# Patient Record
Sex: FEMALE | Race: WHITE | Employment: OTHER | URBAN - METROPOLITAN AREA
[De-identification: names, ages, dates, MRNs, and addresses within clinical notes are randomized per-mention and may not be internally consistent; named-entity substitution may affect disease eponyms.]

---

## 2022-03-30 ENCOUNTER — APPOINTMENT (OUTPATIENT)
Dept: CT IMAGING | Age: 67
DRG: 216 | End: 2022-03-30
Payer: MEDICARE

## 2022-03-30 ENCOUNTER — HOSPITAL ENCOUNTER (INPATIENT)
Age: 67
LOS: 15 days | Discharge: HOME HEALTH | DRG: 216 | End: 2022-04-14
Attending: EMERGENCY MEDICINE | Admitting: THORACIC SURGERY (CARDIOTHORACIC VASCULAR SURGERY)
Payer: MEDICARE

## 2022-03-30 ENCOUNTER — APPOINTMENT (OUTPATIENT)
Dept: GENERAL RADIOLOGY | Age: 67
DRG: 216 | End: 2022-03-30
Payer: MEDICARE

## 2022-03-30 DIAGNOSIS — I34.0 MITRAL VALVE INSUFFICIENCY, UNSPECIFIED ETIOLOGY: Primary | ICD-10-CM

## 2022-03-30 DIAGNOSIS — G89.18 ACUTE POST-OPERATIVE PAIN: ICD-10-CM

## 2022-03-30 DIAGNOSIS — I48.91 ATRIAL FIBRILLATION WITH RVR (HCC): ICD-10-CM

## 2022-03-30 PROBLEM — I50.9 DECOMPENSATED HEART FAILURE (HCC): Status: ACTIVE | Noted: 2022-03-30

## 2022-03-30 PROBLEM — I44.4 LAFB (LEFT ANTERIOR FASCICULAR BLOCK): Status: ACTIVE | Noted: 2022-03-30

## 2022-03-30 PROBLEM — J90 PLEURAL EFFUSION, BILATERAL: Status: ACTIVE | Noted: 2022-03-30

## 2022-03-30 PROBLEM — R01.1 PANSYSTOLIC MURMUR: Status: ACTIVE | Noted: 2022-03-30

## 2022-03-30 PROBLEM — I51.7 CARDIOMEGALY: Status: ACTIVE | Noted: 2022-03-30

## 2022-03-30 LAB
A/G RATIO: 1.5 (ref 1.1–2.2)
ALBUMIN SERPL-MCNC: 4.6 G/DL (ref 3.4–5)
ALP BLD-CCNC: 89 U/L (ref 40–129)
ALT SERPL-CCNC: 19 U/L (ref 10–40)
ANION GAP SERPL CALCULATED.3IONS-SCNC: 12 MMOL/L (ref 3–16)
AST SERPL-CCNC: 31 U/L (ref 15–37)
BASE EXCESS VENOUS: 0.3 MMOL/L (ref -3–3)
BASOPHILS ABSOLUTE: 0 K/UL (ref 0–0.2)
BASOPHILS RELATIVE PERCENT: 0.6 %
BILIRUB SERPL-MCNC: 0.6 MG/DL (ref 0–1)
BILIRUBIN URINE: NEGATIVE
BLOOD, URINE: NEGATIVE
BUN BLDV-MCNC: 18 MG/DL (ref 7–20)
CALCIUM SERPL-MCNC: 9.5 MG/DL (ref 8.3–10.6)
CARBOXYHEMOGLOBIN: 2.4 % (ref 0–1.5)
CHLORIDE BLD-SCNC: 99 MMOL/L (ref 99–110)
CLARITY: CLEAR
CO2: 27 MMOL/L (ref 21–32)
COLOR: YELLOW
CREAT SERPL-MCNC: 0.8 MG/DL (ref 0.6–1.2)
EKG ATRIAL RATE: 156 BPM
EKG DIAGNOSIS: NORMAL
EKG Q-T INTERVAL: 324 MS
EKG QRS DURATION: 86 MS
EKG QTC CALCULATION (BAZETT): 496 MS
EKG R AXIS: 116 DEGREES
EKG T AXIS: -16 DEGREES
EKG VENTRICULAR RATE: 141 BPM
EOSINOPHILS ABSOLUTE: 0.1 K/UL (ref 0–0.6)
EOSINOPHILS RELATIVE PERCENT: 2 %
GFR AFRICAN AMERICAN: >60
GFR NON-AFRICAN AMERICAN: >60
GLUCOSE BLD-MCNC: 96 MG/DL (ref 70–99)
GLUCOSE URINE: NEGATIVE MG/DL
HCO3 VENOUS: 23.8 MMOL/L (ref 23–29)
HCT VFR BLD CALC: 42.2 % (ref 36–48)
HEMOGLOBIN: 14.2 G/DL (ref 12–16)
INR BLD: 1.29 (ref 0.88–1.12)
KETONES, URINE: NEGATIVE MG/DL
LACTIC ACID: 1.1 MMOL/L (ref 0.4–2)
LEUKOCYTE ESTERASE, URINE: NEGATIVE
LYMPHOCYTES ABSOLUTE: 0.7 K/UL (ref 1–5.1)
LYMPHOCYTES RELATIVE PERCENT: 12.8 %
MAGNESIUM: 2.1 MG/DL (ref 1.8–2.4)
MCH RBC QN AUTO: 32.1 PG (ref 26–34)
MCHC RBC AUTO-ENTMCNC: 33.6 G/DL (ref 31–36)
MCV RBC AUTO: 95.7 FL (ref 80–100)
METHEMOGLOBIN VENOUS: 0.6 %
MICROSCOPIC EXAMINATION: NORMAL
MONOCYTES ABSOLUTE: 0.3 K/UL (ref 0–1.3)
MONOCYTES RELATIVE PERCENT: 5 %
NEUTROPHILS ABSOLUTE: 4.6 K/UL (ref 1.7–7.7)
NEUTROPHILS RELATIVE PERCENT: 79.6 %
NITRITE, URINE: NEGATIVE
O2 SAT, VEN: 64 %
O2 THERAPY: ABNORMAL
PCO2, VEN: 35.5 MMHG (ref 40–50)
PDW BLD-RTO: 14.7 % (ref 12.4–15.4)
PH UA: 6.5 (ref 5–8)
PH VENOUS: 7.45 (ref 7.35–7.45)
PLATELET # BLD: 192 K/UL (ref 135–450)
PMV BLD AUTO: 7.2 FL (ref 5–10.5)
PO2, VEN: 32.9 MMHG (ref 25–40)
POTASSIUM REFLEX MAGNESIUM: 4.7 MMOL/L (ref 3.5–5.1)
PRO-BNP: 3634 PG/ML (ref 0–124)
PROCALCITONIN: 0.05 NG/ML (ref 0–0.15)
PROTEIN UA: NEGATIVE MG/DL
PROTHROMBIN TIME: 14.7 SEC (ref 9.9–12.7)
RBC # BLD: 4.41 M/UL (ref 4–5.2)
SODIUM BLD-SCNC: 138 MMOL/L (ref 136–145)
SPECIFIC GRAVITY UA: 1.01 (ref 1–1.03)
TCO2 CALC VENOUS: 25 MMOL/L
TOTAL PROTEIN: 7.6 G/DL (ref 6.4–8.2)
TROPONIN: <0.01 NG/ML
TROPONIN: <0.01 NG/ML
URINE REFLEX TO CULTURE: NORMAL
URINE TYPE: NORMAL
UROBILINOGEN, URINE: 0.2 E.U./DL
WBC # BLD: 5.7 K/UL (ref 4–11)

## 2022-03-30 PROCEDURE — 2580000003 HC RX 258: Performed by: PHYSICIAN ASSISTANT

## 2022-03-30 PROCEDURE — 6370000000 HC RX 637 (ALT 250 FOR IP): Performed by: HOSPITALIST

## 2022-03-30 PROCEDURE — 84439 ASSAY OF FREE THYROXINE: CPT

## 2022-03-30 PROCEDURE — 6360000002 HC RX W HCPCS

## 2022-03-30 PROCEDURE — 93010 ELECTROCARDIOGRAM REPORT: CPT | Performed by: INTERNAL MEDICINE

## 2022-03-30 PROCEDURE — 6360000004 HC RX CONTRAST MEDICATION: Performed by: HOSPITALIST

## 2022-03-30 PROCEDURE — 85610 PROTHROMBIN TIME: CPT

## 2022-03-30 PROCEDURE — 85025 COMPLETE CBC W/AUTO DIFF WBC: CPT

## 2022-03-30 PROCEDURE — 2500000003 HC RX 250 WO HCPCS: Performed by: PHYSICIAN ASSISTANT

## 2022-03-30 PROCEDURE — 2500000003 HC RX 250 WO HCPCS

## 2022-03-30 PROCEDURE — 81003 URINALYSIS AUTO W/O SCOPE: CPT

## 2022-03-30 PROCEDURE — 84484 ASSAY OF TROPONIN QUANT: CPT

## 2022-03-30 PROCEDURE — 83880 ASSAY OF NATRIURETIC PEPTIDE: CPT

## 2022-03-30 PROCEDURE — 80053 COMPREHEN METABOLIC PANEL: CPT

## 2022-03-30 PROCEDURE — P9047 ALBUMIN (HUMAN), 25%, 50ML: HCPCS

## 2022-03-30 PROCEDURE — 84443 ASSAY THYROID STIM HORMONE: CPT

## 2022-03-30 PROCEDURE — 2580000003 HC RX 258

## 2022-03-30 PROCEDURE — 71260 CT THORAX DX C+: CPT

## 2022-03-30 PROCEDURE — 82803 BLOOD GASES ANY COMBINATION: CPT

## 2022-03-30 PROCEDURE — 83605 ASSAY OF LACTIC ACID: CPT

## 2022-03-30 PROCEDURE — 83036 HEMOGLOBIN GLYCOSYLATED A1C: CPT

## 2022-03-30 PROCEDURE — 83735 ASSAY OF MAGNESIUM: CPT

## 2022-03-30 PROCEDURE — 84145 PROCALCITONIN (PCT): CPT

## 2022-03-30 PROCEDURE — 1200000000 HC SEMI PRIVATE

## 2022-03-30 PROCEDURE — 99283 EMERGENCY DEPT VISIT LOW MDM: CPT

## 2022-03-30 PROCEDURE — 36415 COLL VENOUS BLD VENIPUNCTURE: CPT

## 2022-03-30 PROCEDURE — 96374 THER/PROPH/DIAG INJ IV PUSH: CPT

## 2022-03-30 PROCEDURE — 71045 X-RAY EXAM CHEST 1 VIEW: CPT

## 2022-03-30 PROCEDURE — 6360000002 HC RX W HCPCS: Performed by: HOSPITALIST

## 2022-03-30 PROCEDURE — 93005 ELECTROCARDIOGRAM TRACING: CPT | Performed by: EMERGENCY MEDICINE

## 2022-03-30 RX ORDER — ONDANSETRON 4 MG/1
4 TABLET, ORALLY DISINTEGRATING ORAL EVERY 8 HOURS PRN
Status: DISCONTINUED | OUTPATIENT
Start: 2022-03-30 | End: 2022-04-06

## 2022-03-30 RX ORDER — FUROSEMIDE 10 MG/ML
20 INJECTION INTRAMUSCULAR; INTRAVENOUS 2 TIMES DAILY
Status: DISCONTINUED | OUTPATIENT
Start: 2022-03-30 | End: 2022-03-31

## 2022-03-30 RX ORDER — ASPIRIN 81 MG/1
162 TABLET, CHEWABLE ORAL ONCE
Status: DISCONTINUED | OUTPATIENT
Start: 2022-03-30 | End: 2022-04-06

## 2022-03-30 RX ORDER — NITROGLYCERIN 0.4 MG/1
0.4 TABLET SUBLINGUAL EVERY 5 MIN PRN
Status: COMPLETED | OUTPATIENT
Start: 2022-03-30 | End: 2022-04-05

## 2022-03-30 RX ORDER — FUROSEMIDE 10 MG/ML
10 INJECTION INTRAMUSCULAR; INTRAVENOUS 2 TIMES DAILY
Status: DISCONTINUED | OUTPATIENT
Start: 2022-03-31 | End: 2022-03-30

## 2022-03-30 RX ORDER — DILTIAZEM HYDROCHLORIDE 5 MG/ML
10 INJECTION INTRAVENOUS ONCE
Status: COMPLETED | OUTPATIENT
Start: 2022-03-30 | End: 2022-03-30

## 2022-03-30 RX ORDER — POTASSIUM CHLORIDE 7.45 MG/ML
10 INJECTION INTRAVENOUS PRN
Status: DISCONTINUED | OUTPATIENT
Start: 2022-03-30 | End: 2022-04-06

## 2022-03-30 RX ORDER — SENNA PLUS 8.6 MG/1
1 TABLET ORAL DAILY PRN
Status: DISCONTINUED | OUTPATIENT
Start: 2022-03-30 | End: 2022-04-06

## 2022-03-30 RX ORDER — MAGNESIUM SULFATE IN WATER 40 MG/ML
2000 INJECTION, SOLUTION INTRAVENOUS PRN
Status: DISCONTINUED | OUTPATIENT
Start: 2022-03-30 | End: 2022-04-06

## 2022-03-30 RX ORDER — ACETAMINOPHEN 325 MG/1
650 TABLET ORAL EVERY 6 HOURS PRN
Status: DISCONTINUED | OUTPATIENT
Start: 2022-03-30 | End: 2022-04-06

## 2022-03-30 RX ORDER — ASPIRIN 81 MG/1
81 TABLET ORAL DAILY
Status: DISCONTINUED | OUTPATIENT
Start: 2022-03-31 | End: 2022-04-06

## 2022-03-30 RX ORDER — FUROSEMIDE 10 MG/ML
20 INJECTION INTRAMUSCULAR; INTRAVENOUS 2 TIMES DAILY
Status: DISCONTINUED | OUTPATIENT
Start: 2022-03-30 | End: 2022-03-30

## 2022-03-30 RX ORDER — POTASSIUM CHLORIDE 20 MEQ/1
40 TABLET, EXTENDED RELEASE ORAL PRN
Status: DISCONTINUED | OUTPATIENT
Start: 2022-03-30 | End: 2022-04-06

## 2022-03-30 RX ORDER — ACETAMINOPHEN 650 MG/1
650 SUPPOSITORY RECTAL EVERY 6 HOURS PRN
Status: DISCONTINUED | OUTPATIENT
Start: 2022-03-30 | End: 2022-04-06

## 2022-03-30 RX ORDER — ONDANSETRON 2 MG/ML
4 INJECTION INTRAMUSCULAR; INTRAVENOUS EVERY 6 HOURS PRN
Status: DISCONTINUED | OUTPATIENT
Start: 2022-03-30 | End: 2022-04-06

## 2022-03-30 RX ADMIN — DILTIAZEM HYDROCHLORIDE 5 MG/HR: 5 INJECTION INTRAVENOUS at 16:29

## 2022-03-30 RX ADMIN — APIXABAN 5 MG: 5 TABLET, FILM COATED ORAL at 20:50

## 2022-03-30 RX ADMIN — ACETAMINOPHEN 650 MG: 325 TABLET ORAL at 21:02

## 2022-03-30 RX ADMIN — DILTIAZEM HYDROCHLORIDE 10 MG: 5 INJECTION INTRAVENOUS at 16:29

## 2022-03-30 RX ADMIN — FUROSEMIDE 20 MG: 10 INJECTION, SOLUTION INTRAMUSCULAR; INTRAVENOUS at 20:50

## 2022-03-30 RX ADMIN — IOPAMIDOL 75 ML: 755 INJECTION, SOLUTION INTRAVENOUS at 17:53

## 2022-03-30 ASSESSMENT — ENCOUNTER SYMPTOMS
EYES NEGATIVE: 1
BACK PAIN: 0
COLOR CHANGE: 0
SHORTNESS OF BREATH: 1
VOMITING: 0
CHEST TIGHTNESS: 0
ABDOMINAL PAIN: 0
NAUSEA: 0

## 2022-03-30 ASSESSMENT — PAIN SCALES - GENERAL: PAINLEVEL_OUTOF10: 4

## 2022-03-30 NOTE — ED NOTES
[place] ([facility]) sandwich and water given per MD request.      Juana Reyes RN  03/30/22 8899

## 2022-03-30 NOTE — ED PROVIDER NOTES
[address] [facility]  ED  EMERGENCY DEPARTMENT ENCOUNTER        Pt Name: David Reyez  MRN: [id number]  Elena 1955  Date of evaluation: 3/30/2022  Provider: Rock Angel PA-C  PCP: No primary care provider on file. ED Attending: Efrain Weaver      This patient was seen by the attending provider      History provided by the patient    CHIEF COMPLAINT:     Chief Complaint   Patient presents with    Atrial Fibrillation     hx of afib, was at PCP today and told that her heart was going to quickly       HISTORY OF PRESENT ILLNESS:      David Reyez is a 75 y.o. female who arrives to the ED by private vehicle. This patient reports for the last several months she has been getting progressively more fatigued, generally weak, short of breath with exertion and she has noted some swelling in her legs at times. She states for years she is checked her blood pressure and heart rate at home and at times is found her heart rate to be as high as the 140s or 150s. She states she is believes she has had A. fib for years but never wanted to go to the doctor. Finally, after more than 20 years, patient scheduled PCP appointment today. When she got there, she was told she was in A. fib with RVR and needed to come to the ED. On arrival patient denies pain. She does admit to feeling generally weak. She seems to identify that her symptoms are worse with activity and better at rest.  No other complaints or concerns. No known medical history as she never goes to the doctor. Nursing Notes were reviewed     REVIEW OF SYSTEMS:     Review of Systems   Constitutional: Positive for activity change and fatigue. Negative for fever. HENT: Negative. Eyes: Negative. Respiratory: Positive for shortness of breath. Negative for chest tightness. Cardiovascular: Positive for palpitations and leg swelling. Negative for chest pain. Gastrointestinal: Negative for abdominal pain, nausea and vomiting.    Genitourinary: Negative. Musculoskeletal: Negative for back pain, gait problem and neck pain. Skin: Negative for color change. Neurological: Positive for weakness (generalized). Negative for headaches. All other systems reviewed and are negative. Except as noted above in the ROS, all other systems were reviewed and negative. PAST MEDICAL HISTORY:     Past Medical History:   Diagnosis Date    Varicose vein          SURGICAL HISTORY:      Past Surgical History:   Procedure Laterality Date    TONSILLECTOMY           CURRENT MEDICATIONS:       Previous Medications    ASPIRIN 300 MG SUPPOSITORY    Place 300 mg rectally every 6 hours as needed. ALLERGIES:    Dairycare [lactase-lactobacillus] and Eggs or egg-derived products    FAMILY HISTORY:       Family History   Problem Relation Age of Onset    Cancer Father     Cancer Paternal Aunt     Cancer Paternal Grandmother           SOCIAL HISTORY:       Social History     Socioeconomic History    Marital status: Single     Spouse name: None    Number of children: None    Years of education: None    Highest education level: None   Occupational History    None   Tobacco Use    Smoking status: Never Smoker    Smokeless tobacco: None   Substance and Sexual Activity    Alcohol use: No    Drug use: No    Sexual activity: None   Other Topics Concern    None   Social History Narrative    None     Social Determinants of Health     Financial Resource Strain:     Difficulty of Paying Living Expenses: Not on file   Food Insecurity:     Worried About Running Out of Food in the Last Year: Not on file    Tawanna of Food in the Last Year: Not on file   Transportation Needs:     Lack of Transportation (Medical): Not on file    Lack of Transportation (Non-Medical):  Not on file   Physical Activity:     Days of Exercise per Week: Not on file    Minutes of Exercise per Session: Not on file   Stress:     Feeling of Stress : Not on file   Social Connections:  Frequency of Communication with Friends and Family: Not on file    Frequency of Social Gatherings with Friends and Family: Not on file    Attends Zoroastrianism Services: Not on file    Active Member of Clubs or Organizations: Not on file    Attends Club or Organization Meetings: Not on file    Marital Status: Not on file   Intimate Partner Violence:     Fear of Current or Ex-Partner: Not on file    Emotionally Abused: Not on file    Physically Abused: Not on file    Sexually Abused: Not on file   Housing Stability:     Unable to Pay for Housing in the Last Year: Not on file    Number of [place] in the Last Year: Not on file    Unstable Housing in the Last Year: Not on file       SCREENINGS:             PHYSICAL EXAM:       ED Triage Vitals   BP Temp Temp src Pulse Resp SpO2 Height Weight   03/30/22 1556 03/30/22 1558 -- 03/30/22 1556 03/30/22 1556 03/30/22 1556 -- 03/30/22 1556   (!) 150/108 98.7 °F (37.1 °C)  128 18 96 %  107 lb (48.5 kg)       Physical Exam    CONSTITUTIONAL: Awake and alert. Cooperative. Well-developed. Well-nourished. Non-toxic. No acute distress. HENT: Normocephalic. Atraumatic. External ears normal, without discharge. No nasal discharge. Oropharynx clear. Mucous membranes moist.  EYES: Conjunctiva non-injected. No scleral icterus. PERRL. EOM's grossly intact. NECK: Supple. Normal ROM. CARDIOVASCULAR: Tachycardic with irregularly irregular rhythm. No Murmer. Intact distal pulses. PULMONARY/CHEST WALL: Effort normal. No tachypnea. Lungs clear to ausculation. ABDOMEN: Normal BS. Soft. Nondistended. No tenderness to palpate. No guarding. /ANORECTAL: Not assessed  MUSKULOSKELETAL: Normal ROM. No acute deformities. 1+ pitting edema bilateral LEs. No tenderness to palpate. SKIN: Warm and dry. No rash. NEUROLOGICAL: Alert and oriented x 3. GCS 15. CN II-XII grossly intact. Strength is 5/5 in all extremities and sensation is intact. Normal gait.    PSYCHIATRIC: Normal affect        DIAGNOSTICRESULTS:     LABS:    Results for orders placed or performed during the hospital encounter of 03/30/22   CBC with Auto Differential   Result Value Ref Range    WBC 5.7 4.0 - 11.0 K/uL    RBC 4.41 4.00 - 5.20 M/uL    Hemoglobin 14.2 12.0 - 16.0 g/dL    Hematocrit 42.2 36.0 - 48.0 %    MCV 95.7 80.0 - 100.0 fL    MCH 32.1 26.0 - 34.0 pg    MCHC 33.6 31.0 - 36.0 g/dL    RDW 14.7 12.4 - 15.4 %    Platelets 910 722 - 075 K/uL    MPV 7.2 5.0 - 10.5 fL    Neutrophils % 79.6 %    Lymphocytes % 12.8 %    Monocytes % 5.0 %    Eosinophils % 2.0 %    Basophils % 0.6 %    Neutrophils Absolute 4.6 1.7 - 7.7 K/uL    Lymphocytes Absolute 0.7 (L) 1.0 - 5.1 K/uL    Monocytes Absolute 0.3 0.0 - 1.3 K/uL    Eosinophils Absolute 0.1 0.0 - 0.6 K/uL    Basophils Absolute 0.0 0.0 - 0.2 K/uL   Comprehensive Metabolic Panel w/ Reflex to MG   Result Value Ref Range    Sodium 138 136 - 145 mmol/L    Potassium reflex Magnesium 4.7 3.5 - 5.1 mmol/L    Chloride 99 99 - 110 mmol/L    CO2 27 21 - 32 mmol/L    Anion Gap 12 3 - 16    Glucose 96 70 - 99 mg/dL    BUN 18 7 - 20 mg/dL    CREATININE 0.8 0.6 - 1.2 mg/dL    GFR Non-African American >60 >60    GFR African American >60 >60    Calcium 9.5 8.3 - 10.6 mg/dL    Total Protein 7.6 6.4 - 8.2 g/dL    Albumin 4.6 3.4 - 5.0 g/dL    Albumin/Globulin Ratio 1.5 1.1 - 2.2    Total Bilirubin 0.6 0.0 - 1.0 mg/dL    Alkaline Phosphatase 89 40 - 129 U/L    ALT 19 10 - 40 U/L    AST 31 15 - 37 U/L   Troponin   Result Value Ref Range    Troponin <0.01 <0.01 ng/mL   Brain Natriuretic Peptide   Result Value Ref Range    Pro-BNP 3,634 (H) 0 - 124 pg/mL   EKG 12 Lead   Result Value Ref Range    Ventricular Rate 141 BPM    Atrial Rate 156 BPM    QRS Duration 86 ms    Q-T Interval 324 ms    QTc Calculation (Bazett) 496 ms    R Axis 116 degrees    T Axis -16 degrees    Diagnosis       Atrial fibrillation with rapid ventricular response with premature ventricular or aberrantly conducted complexesLeft posterior fascicular blockAnterior infarct , age undeterminedAbnormal ECGNo previous ECGs availableConfirmed by Brett Luis ([id number]) on 3/30/2022 5:20:14 PM         RADIOLOGY:  All x-ray studies areviewed/reviewed by me. Formal interpretations per the radiologist are as follows:    XR CHEST PORTABLE    Result Date: 3/30/2022  EXAMINATION: ONE XRAY VIEW OF THE CHEST 3/30/2022 4:10 pm COMPARISON: None. HISTORY: ORDERING SYSTEM PROVIDED HISTORY: SOB, a fib with RVR TECHNOLOGIST PROVIDED HISTORY: Reason for exam:->SOB, a fib with RVR Reason for Exam: afib FINDINGS: Cardiomegaly. Bilateral pleural effusions right greater than left. No pneumothorax. Bibasilar opacities. No pulmonary vascular congestion. Bilateral pleural effusions right greater than left with bibasilar opacities that could represent atelectasis or pneumonia       EKG: The Ekg interpreted by me in the absence of a cardiologist shows. atrial fibrillation with a rate of 141    See also interpretation by DO Junior      PROCEDURES:   N/A    CRITICAL CARE TIME:       Due to the immediate potential for life-threatening deterioration due to A. fib with RVR requiring diltiazem bolus, infusion and hospitalization, I spent 34 minutes providing critical care. This time is excluding time spent performing procedures.       CONSULTS:  IP CONSULT TO HOSPITALIST  IP CONSULT TO CARDIOLOGY      EMERGENCY DEPARTMENT COURSE and DIFFERENTIAL DIAGNOSIS/MDM:   Vitals:    Vitals:    03/30/22 1558 03/30/22 1631 03/30/22 1636 03/30/22 1730   BP:  (!) 128/96  135/83   Pulse: 98 131 90 112   Resp:  28 24 17   Temp: 98.7 °F (37.1 °C)      SpO2:  95% 92% 93%   Weight:           Patient was given the following medications:  Medications   dilTIAZem injection 10 mg (10 mg IntraVENous Given 3/30/22 1629)     Followed by   dilTIAZem 125 mg in dextrose 5 % 125 mL infusion (10 mg/hr IntraVENous Rate/Dose Change 3/30/22 1736)   iopamidol ([id number]) 76 % thatportions of this note were completed with a voice recognition program.  Efforts were made to edit the dictations, but occasionally words are mis-transcribed.)    Armin Post PA-C (electronicallysigned)              Shobha Jasmine, [id number]  03/30/22 2871

## 2022-03-30 NOTE — H&P
HOSPITALISTS HISTORY AND PHYSICAL    3/30/2022 5:45 PM    Patient Information:  Janett Franks is a 75 y.o. female [id number]  PCP:  No primary care provider on file. (Tel: None )    Chief complaint:    Chief Complaint   Patient presents with    Atrial Fibrillation     hx of afib, was at PCP today and told that her heart was going to quickly        History of Present Illness:  Per Duggan is a 75 y.o. female who presented to the ED to be evaluated for a several year history of heart palpitations, which became associated with weakness and dyspnea 3 months PTA. The patient reports that she has not been seen by any type of physician for greater than 2 decades. She is scheduled a new PCP appointment for earlier today, but when she arrived she was tachycardic into the 150s therefore EMS was contacted to transport her to the ED. Upon further questioning, patient endorses intermittent chest pain for which she takes a baby aspirin PRN. She states that she has noted increased THOMPSON as well as BLE edema ongoing since the beginning of 2022. Upon arrival to the ED EKG was obtained revealing A. fib RVR with ventricular rate 141; PVCs, anterior infarct of unknown duration, L APB, and QTC prolongation also appreciated. CXR notable for bilateral pleural effusions as well as bibasilar opacities. Admitting hospitalist physician has ordered chest CT with PE protocol to further assess. Labs were essentially unremarkable excluding BNP 3634. Patient received IV Cardizem bolus and drip initiation per ED provider. She will be admitted for new onset A. fib with suspected decompensated CHF. History obtained from patient and review of Epic chart    REVIEW OF SYSTEMS:   Constitutional: Negative for fever,chills; positive generalized weakness  ENT: Negative for headache, rhinorrhea, and sore throat.   Respiratory: Exertional dyspnea x3 months; denies wheezing, and cough  Cardiovascular: Intermittent chest pain \"for years\"; positive palpitations and BLE peripheral edema  Gastrointestinal: Negative for N/V/D and abdominal pain; no hematemesis, hematochezia, or melena; no anorexia  Genitourinary: Negative for dysuria, frequency, retention; no incontinence  Hematologic/Lymphatic: Negative for bleeding tendency/excessive bruising  Musculoskeletal: Negative for myalgias and arthalgias; able to ambulate without difficulty  Neurologic: Negative for LOC, seizure activity, paresthesias, dysarthria, vertigo, and gait disturbance  Skin: Negative for itching,rash, decubitus  Psychiatric: Negative for depression,anxiety, and agitation; no hallucinations; denies SI/HI  Endocrine: Negative for polyuria/polydipsia/polyphagia; no heat/cold intolerance    Past Medical History:   has a past medical history of Varicose vein. Past Surgical History:   has a past surgical history that includes Tonsillectomy. Medications:  No current facility-administered medications on file prior to encounter. Current Outpatient Medications on File Prior to Encounter   Medication Sig Dispense Refill    aspirin 300 MG suppository Place 300 mg rectally every 6 hours as needed. Allergies: Allergies   Allergen Reactions    Dairycare [Lactase-Lactobacillus]     Eggs Or Egg-Derived Products         Social History:   reports that she has never smoked. She does not have any smokeless tobacco history on file. She reports that she does not drink alcohol and does not use drugs. Family History:  family history includes Cancer in her father, paternal aunt, and paternal grandmother. Physical Exam:  /83   Pulse 112   Temp 98.7 °F (37.1 °C)   Resp 17   Wt 107 lb (48.5 kg)   SpO2 93%     General appearance:  Thin female resting in bed, appears anxious  Eyes: Sclera clear without conjunctival injection; PERRLA; EOMI  ENT: Mucous membranes moist without thrush; normal dentition  Neck: Supple without meningismus; no goiter; no carotid bruit bilaterally  Cardiovascular: Irregularly irregular tachyarrhythmia; 4/5 pansystolic murmur; 1+ BLE peripheral edema; no JVD  Respiratory: Mild tachypnea; CTAB with diminished bibasilar breath sounds  Gastrointestinal: Abdomen soft, non-tender, not distended; bowel sounds normal; no masses/organomegaly appreciated  Musculoskeletal: FROM spine and extremities x4; no gross deformity  Neurology: A&O x3; cranial nerves 2-12 grossly intact; motor 5/5  BUE/BLE; no seizure activity;   Psychiatry: Well-groomed with good eye contact; appropriate affect; no visual/auditory hallucination  Skin: Warm, dry, normal turgor, no rash  PV: 2/4 radial and dorsalis pedis bilaterally; brisk capillary refill    Labs:  CBC:   Lab Results   Component Value Date    WBC 5.7 03/30/2022    RBC 4.41 03/30/2022    HGB 14.2 03/30/2022    HCT 42.2 03/30/2022    MCV 95.7 03/30/2022    MCH 32.1 03/30/2022    MCHC 33.6 03/30/2022    RDW 14.7 03/30/2022     03/30/2022    MPV 7.2 03/30/2022     BMP:    Lab Results   Component Value Date     03/30/2022    K 4.7 03/30/2022    CL 99 03/30/2022    CO2 27 03/30/2022    BUN 18 03/30/2022    CREATININE 0.8 03/30/2022    CALCIUM 9.5 03/30/2022    GFRAA >60 03/30/2022    LABGLOM >60 03/30/2022    GLUCOSE 96 03/30/2022     XR CHEST PORTABLE   Final Result   Bilateral pleural effusions right greater than left with bibasilar opacities   that could represent atelectasis or pneumonia         CT CHEST PULMONARY EMBOLISM W CONTRAST    (Results Pending)         EKG:    Ventricular Rate 141 BPM QTc Calculation (Bazett) 496 ms   Atrial Rate 156 BPM R Axis 116 degrees   QRS Duration 86 ms T Axis -16 degrees   Q-T Interval 324 ms Diagnosis Atrial fibrillation with rapid ventricular response with PVCs; anterior infarct age indeterminant; LPFB present; QTC prolongation       I visualized CXR images and EKG strips personally and agree with documented interpretation    Discussed case  with ED provider    Problem List:  Principal Problem:    Atrial fibrillation with RVR ([address]  Active Problems:    New onset atrial fibrillation (HCC)    Decompensated heart failure (HCC)    Pleural effusion, bilateral    LAFB (left anterior fascicular block)  Resolved Problems:    * No resolved hospital problems.  *        Consults:  IP CONSULT TO HOSPITALIST  IP CONSULT TO CARDIOLOGY      Assessment/Plan:     New onset A. fib with RVR  -Admit to medical floor for continuous telemetry and pulse oximetry monitoring  -Patient initiated on IV Cardizem bolus and drip, the latter of which will be continued overnight  -Electrolytes, thyroid studies to assess for secondary causes of this tachyarrhythmia  -Patient with CHADS2 score of 3, therefore she is a candidate for anticoagulation; Eliquis 5 mg twice daily initiated overnight  -Echo scheduled for the a.m. to further assess cardiac structure and function  -Consultation placed to Cardiology for further evaluation and treatment    Chest pain with LPFB  -Admit to telemetry floor with serial cardiac enzymes to be obtained overnight  -ASA administered in ED and to be continued at 81 mg PO QAM  -FLP ordered for a.m.; patient declined statin this evening  -Nitropaste applied to chest wall (BP permitting)  -ECHO scheduled for a.m.  -Nuclear stress test not ordered at this time due to A. fib RVR presentation  -Serial troponins to be trended overnight    Pulmonary edema  -Suspect decompensated CHF, although patient with no previous cardiac history or echo available for review  -Chest CT with PE protocol ordered revealing massive cardiomegaly with severe LAE enlargement  -Admit to floor for continuous telemetry monitoring and strict I's & O's  -IV Lasix 20 mg initiated Q12 hours  -2G Na and fluid restriction dietary modifications in place    DVT prophylaxis-Eliquis 5 mg twice daily (due to CHADS2 score of 3)  Code status-full code  Diet-cardiac GUIDO now than n.p.o. after midnight  IV access-PIV established in ED      Admit as inpatient. I anticipate hospitalization spanning more than two midnights for investigation and treatment of the above medically necessary diagnoses. Comment: Please note this report has been produced using speech recognition software and may contain errors related to that system including errors in grammar, punctuation, and spelling, as well as words and phrases that may be inappropriate. If there are any questions or concerns please feel free to contact the dictating provider for clarification.          Ana Laura Lucero MD    3/30/2022 5:45 PM

## 2022-03-31 LAB
A/G RATIO: 1.5 (ref 1.1–2.2)
ALBUMIN SERPL-MCNC: 4.5 G/DL (ref 3.4–5)
ALP BLD-CCNC: 83 U/L (ref 40–129)
ALT SERPL-CCNC: 18 U/L (ref 10–40)
ANION GAP SERPL CALCULATED.3IONS-SCNC: 13 MMOL/L (ref 3–16)
AST SERPL-CCNC: 29 U/L (ref 15–37)
BILIRUB SERPL-MCNC: 0.8 MG/DL (ref 0–1)
BUN BLDV-MCNC: 18 MG/DL (ref 7–20)
CALCIUM SERPL-MCNC: 9.4 MG/DL (ref 8.3–10.6)
CHLORIDE BLD-SCNC: 101 MMOL/L (ref 99–110)
CHOLESTEROL, TOTAL: 185 MG/DL (ref 0–199)
CO2: 27 MMOL/L (ref 21–32)
CREAT SERPL-MCNC: 0.8 MG/DL (ref 0.6–1.2)
ESTIMATED AVERAGE GLUCOSE: 116.9 MG/DL
GFR AFRICAN AMERICAN: >60
GFR NON-AFRICAN AMERICAN: >60
GLUCOSE BLD-MCNC: 100 MG/DL (ref 70–99)
HBA1C MFR BLD: 5.7 %
HDLC SERPL-MCNC: 70 MG/DL (ref 40–60)
LDL CHOLESTEROL CALCULATED: 102 MG/DL
LV EF: 55 %
LVEF MODALITY: NORMAL
POTASSIUM REFLEX MAGNESIUM: 3.9 MMOL/L (ref 3.5–5.1)
SODIUM BLD-SCNC: 141 MMOL/L (ref 136–145)
T4 FREE: 1.2 NG/DL (ref 0.9–1.8)
TOTAL PROTEIN: 7.5 G/DL (ref 6.4–8.2)
TRIGL SERPL-MCNC: 64 MG/DL (ref 0–150)
TROPONIN: <0.01 NG/ML
TSH SERPL DL<=0.05 MIU/L-ACNC: 4.4 UIU/ML (ref 0.27–4.2)
VLDLC SERPL CALC-MCNC: 13 MG/DL

## 2022-03-31 PROCEDURE — 2500000003 HC RX 250 WO HCPCS: Performed by: HOSPITALIST

## 2022-03-31 PROCEDURE — 84484 ASSAY OF TROPONIN QUANT: CPT

## 2022-03-31 PROCEDURE — 80053 COMPREHEN METABOLIC PANEL: CPT

## 2022-03-31 PROCEDURE — 80061 LIPID PANEL: CPT

## 2022-03-31 PROCEDURE — 6370000000 HC RX 637 (ALT 250 FOR IP): Performed by: INTERNAL MEDICINE

## 2022-03-31 PROCEDURE — 36415 COLL VENOUS BLD VENIPUNCTURE: CPT

## 2022-03-31 PROCEDURE — 99223 1ST HOSP IP/OBS HIGH 75: CPT | Performed by: INTERNAL MEDICINE

## 2022-03-31 PROCEDURE — 6360000002 HC RX W HCPCS: Performed by: INTERNAL MEDICINE

## 2022-03-31 PROCEDURE — 2580000003 HC RX 258: Performed by: HOSPITALIST

## 2022-03-31 PROCEDURE — 1200000000 HC SEMI PRIVATE

## 2022-03-31 PROCEDURE — 6370000000 HC RX 637 (ALT 250 FOR IP): Performed by: NURSE PRACTITIONER

## 2022-03-31 PROCEDURE — 6370000000 HC RX 637 (ALT 250 FOR IP): Performed by: HOSPITALIST

## 2022-03-31 PROCEDURE — 93306 TTE W/DOPPLER COMPLETE: CPT

## 2022-03-31 RX ORDER — BUTALBITAL, ACETAMINOPHEN AND CAFFEINE 50; 325; 40 MG/1; MG/1; MG/1
1 TABLET ORAL EVERY 4 HOURS PRN
Status: DISCONTINUED | OUTPATIENT
Start: 2022-03-31 | End: 2022-04-06

## 2022-03-31 RX ORDER — FUROSEMIDE 10 MG/ML
40 INJECTION INTRAMUSCULAR; INTRAVENOUS 2 TIMES DAILY
Status: DISCONTINUED | OUTPATIENT
Start: 2022-03-31 | End: 2022-04-03

## 2022-03-31 RX ORDER — METOPROLOL SUCCINATE 25 MG/1
25 TABLET, EXTENDED RELEASE ORAL DAILY
Status: DISCONTINUED | OUTPATIENT
Start: 2022-03-31 | End: 2022-04-02

## 2022-03-31 RX ADMIN — ENOXAPARIN SODIUM 50 MG: 60 INJECTION SUBCUTANEOUS at 22:30

## 2022-03-31 RX ADMIN — FUROSEMIDE 40 MG: 10 INJECTION, SOLUTION INTRAMUSCULAR; INTRAVENOUS at 11:16

## 2022-03-31 RX ADMIN — DILTIAZEM HYDROCHLORIDE 10 MG/HR: 5 INJECTION INTRAVENOUS at 04:12

## 2022-03-31 RX ADMIN — METOPROLOL SUCCINATE 25 MG: 25 TABLET, EXTENDED RELEASE ORAL at 11:16

## 2022-03-31 RX ADMIN — ASPIRIN 81 MG: 81 TABLET, COATED ORAL at 11:16

## 2022-03-31 RX ADMIN — BUTALBITAL, ACETAMINOPHEN, AND CAFFEINE 1 TABLET: 50; 325; 40 TABLET ORAL at 12:44

## 2022-03-31 RX ADMIN — FUROSEMIDE 40 MG: 10 INJECTION, SOLUTION INTRAMUSCULAR; INTRAVENOUS at 17:16

## 2022-03-31 RX ADMIN — ENOXAPARIN SODIUM 50 MG: 60 INJECTION SUBCUTANEOUS at 11:16

## 2022-03-31 RX ADMIN — ACETAMINOPHEN 650 MG: 325 TABLET ORAL at 04:10

## 2022-03-31 ASSESSMENT — PAIN DESCRIPTION - LOCATION
LOCATION: HEAD
LOCATION: HEAD

## 2022-03-31 ASSESSMENT — PAIN SCALES - GENERAL
PAINLEVEL_OUTOF10: 4
PAINLEVEL_OUTOF10: 2
PAINLEVEL_OUTOF10: 6
PAINLEVEL_OUTOF10: 5
PAINLEVEL_OUTOF10: 2
PAINLEVEL_OUTOF10: 5
PAINLEVEL_OUTOF10: 2
PAINLEVEL_OUTOF10: 2

## 2022-03-31 ASSESSMENT — PAIN DESCRIPTION - PAIN TYPE
TYPE: ACUTE PAIN
TYPE: CHRONIC PAIN

## 2022-03-31 NOTE — PLAN OF CARE
Problem: OXYGENATION/RESPIRATORY FUNCTION  Goal: Patient will maintain patent airway  Outcome: Ongoing  Note:   Patient's EF (Ejection Fraction) is greater than 40%    Heart Failure Medications:  Diuretics[de-identified] Furosemide    (One of the following REQUIRED for EF </= 40%/SYSTOLIC FAILURE but MAY be used in EF% >40%/DIASTOLIC FAILURE)        ACE[de-identified] None        ARB[de-identified] None         ARNI[de-identified] None    (Beta Blockers)  NON- Evidenced Based Beta Blocker (for EF% >40%/DIASTOLIC FAILURE): None    Evidenced Based Beta Blocker::(REQUIRED for EF% <40%/SYSTOLIC FAILURE) Metoprolol SUCCinate- Toprol XL  . .................................................................................................................................................. Patient's weights and intake/output reviewed: Yes    Patient's Last Weight: 102 lbs obtained by standing scale. Difference of 5 lbs less than last documented weight. Intake/Output Summary (Last 24 hours) at 3/31/2022 1132  Last data filed at 3/31/2022 0954  Gross per 24 hour   Intake 0 ml   Output 1100 ml   Net -1100 ml       Comorbidities Reviewed Yes    Patient has a past medical history of Varicose vein. >>For CHF and Comorbidity documentation on Education Time and Topics, please see Education Tab    Progressive Mobility Assessment:  What is this patient's Current Level of Mobility?: Ambulatory-Up Ad Anisha  How was this patient Mobilized today?: Edge of Bed,  Up to Toilet/Shower, and Up in Room, ambulated 20 ft                 With Whom? Self                 Level of Difficulty/Assistance: Independent     Pt resting in bed at this time on room air. Pt denies shortness of breath. Pt without lower extremity edema.      Patient and/or Family's stated Goal of Care this Admission: reduce shortness of breath, increase activity tolerance, better understand heart failure and disease management, and be more comfortable prior to discharge        :

## 2022-03-31 NOTE — PROGRESS NOTES
Patient admitted to room 204 from ER. Patient oriented to room, call light, bed rails, phone, lights and bathroom. Patient instructed about the schedule of the day including: vital sign frequency, lab draws, possible tests, frequency of MD and staff rounds, including RN/MD rounding together at bedside, daily weights, and I &O's. Patient instructed about prescribed diet, how to use television. bed alarm in place, patient aware of placement and reason. Telemetry box  in place, patient aware of placement and reason. Bed locked, in lowest position, side rails up 2/4, call light within reach. Will continue to monitor.

## 2022-03-31 NOTE — PROGRESS NOTES
Hospitalist Progress Note      PCP: No primary care provider on file. Date of Admission: 3/30/2022    Chief Complaint: [facility] Course: Terese Shah is a 75 y.o. female who presented to the ED to be evaluated for a several year history of heart palpitations, which became associated with weakness and dyspnea 3 months PTA. The patient reports that she has not been seen by any type of physician for greater than 2 decades. She is scheduled a new PCP appointment for earlier today, but when she arrived she was tachycardic into the 150s therefore EMS was contacted to transport her to the ED. Upon further questioning, patient endorses intermittent chest pain for which she takes a baby aspirin PRN. She states that she has noted increased THOMPSON as well as BLE edema ongoing since the beginning of 2022.     Upon arrival to the ED EKG was obtained revealing A. fib RVR with ventricular rate 141; PVCs, anterior infarct of unknown duration, L APB, and QTC prolongation also appreciated. CXR notable for bilateral pleural effusions as well as bibasilar opacities. Admitting hospitalist physician has ordered chest CT with PE protocol to further assess. Labs were essentially unremarkable excluding BNP 3634. Patient received IV Cardizem bolus and drip initiation per ED provider. She will be admitted for new onset A. fib with suspected decompensated CHF. Subjective: C/O headache, +THOMPSON. Updated on plan of care.        Medications:  Reviewed    Infusion Medications    dilTIAZem 10 mg/hr (03/31/22 4948)     Scheduled Medications    aspirin  162 mg Oral Once    aspirin  81 mg Oral Daily    apixaban  5 mg Oral BID    furosemide  20 mg IntraVENous BID     PRN Meds: potassium chloride **OR** potassium alternative oral replacement **OR** potassium chloride, magnesium sulfate, senna, acetaminophen **OR** acetaminophen, perflutren lipid microspheres, ondansetron **OR** ondansetron, nitroGLYCERIN      Intake/Output Summary (Last 24 hours) at 3/31/2022 0901  Last data filed at 3/31/2022 2657  Gross per 24 hour   Intake --   Output 1100 ml   Net -1100 ml       Physical Exam Performed:    /73   Pulse 84   Temp 98.1 °F (36.7 °C) (Oral)   Resp 18   Wt 102 lb 8 oz (46.5 kg)   SpO2 91%     General appearance: No apparent distress, appears stated age and cooperative. HEENT: Pupils equal, round, and reactive to light. Conjunctivae/corneas clear. Neck: Supple, with full range of motion. No jugular venous distention. Trachea midline. Respiratory:  Normal respiratory effort. Clear to auscultation, bilaterally without Rales/Wheezes/Rhonchi. Cardiovascular: Irregular rate and rhythm with BAY  Abdomen: Soft, non-tender, non-distended with normal bowel sounds. Musculoskeletal: No clubbing, cyanosis or edema bilaterally. Full range of motion without deformity. Skin: Skin color, texture, turgor normal.  No rashes or lesions. Neurologic:  Neurovascularly intact without any focal sensory/motor deficits.  Cranial nerves: II-XII intact, grossly non-focal.  Psychiatric: Alert and oriented, thought content appropriate, normal insight  Capillary Refill: Brisk,3 seconds, normal   Peripheral Pulses: +2 palpable, equal bilaterally       Labs:   Recent Labs     03/30/22  1605   WBC 5.7   HGB 14.2   HCT 42.2        Recent Labs     03/30/22  1605 03/31/22  0644    141   K 4.7 3.9   CL 99 101   CO2 27 27   BUN 18 18   CREATININE 0.8 0.8   CALCIUM 9.5 9.4     Recent Labs     03/30/22  1605 03/31/22  0644   AST 31 29   ALT 19 18   BILITOT 0.6 0.8   ALKPHOS 89 83     Recent Labs     03/30/22 2051   INR 1.29*     Recent Labs     03/30/22  1605 03/30/22 2051 03/31/22  0037   TROPONINI <0.01 <0.01 <0.01       Urinalysis:      Lab Results   Component Value Date    NITRU Negative 03/30/2022    BLOODU Negative 03/30/2022    SPECGRAV 1.010 03/30/2022    GLUCOSEU Negative 03/30/2022       Radiology:  CT CHEST PULMONARY EMBOLISM W CONTRAST   Final Result   No evidence of pulmonary embolism      Cardiomegaly, including severe left atrial enlargement. Small bilateral pleural effusions, right greater than left, with associated   atelectasis. Mild smooth interlobular septal thickening, favored to represent mild   interstitial pulmonary edema.          XR CHEST PORTABLE   Final Result   Bilateral pleural effusions right greater than left with bibasilar opacities   that could represent atelectasis or pneumonia                 Assessment/Plan:    Active Hospital Problems    Diagnosis     Atrial fibrillation with RVR ([address] [I48.91]     New onset atrial fibrillation (HCC) [I48.91]     Decompensated heart failure ([address] [I50.9]     Pleural effusion, bilateral [J90]     LAFB (left anterior fascicular block) [I44.4]     Cardiomegaly [Q66.1]     Pansystolic murmur [V85.6]      New onset A. fib with RVR  -Tele  -Patient initiated on IV Cardizem bolus and drip, the latter of which will be continued overnight  -Electrolytes, thyroid studies to assess for secondary causes of this tachyarrhythmia  -Patient with CHADS2 score of 3, Eliquis initiated-however d/c'd this morning after Echo findings-start lovenox tx dose  -Echo: EF 55%, severe MR, mild AR, mild to mod TR  -Cardiology consulted     Chest pain   -ASA daily  -FLP pending.; patient declined statin this evening  -Nitropaste applied to chest wall (BP permitting)  -Nuclear stress test not ordered at this time due to A. fib RVR presentation  -Serial troponin negative     Pulmonary edema  -Suspect decompensated CHF, although patient with no previous cardiac history or echo available for review  -Chest CT with PE protocol ordered revealing massive cardiomegaly with severe LAE enlargement  -Admit to floor for continuous telemetry monitoring and strict I's & O's  -IV Lasix 20 mg initiated Q12 hours  -2G Na and fluid restriction dietary modifications in place    DVT Prophylaxis: Lovenox  Diet: Diet NPO  Code Status: Full Code    PT/OT Eval Status: ambulatory    Dispo - pending course 1-2 days    Beckie Martino APRN - CNP

## 2022-03-31 NOTE — CARE COORDINATION
CASE MANAGEMENT INITIAL ASSESSMENT    Reviewed chart and completed assessment with patient  Family present: none  Explained Case Management role/services. Health Care Decision Maker :   Primary Decision Maker: Riccardo Cheng - Niece/Nephew - [phone]        Admit date/status:3/30 Inpatient  Diagnosis: Atrial Fibrillation With Rvr   Is this a Readmission?:  No      Insurance:[employer] Medicare   Precert required for SNF: Yes       3 night stay required: No    Living arrangements, Adls, care needs, prior to admission: Pt lives alone, drives, uses no DME, has no home care or services. Current PCP: Dr Tamiko Pérez    Transportation needs:  Pt typically drives self, may ask family for ride. PT/OT recs: Not ordered, typically independent    Barriers to discharge: None noted    Plan/comments: CM will continue to follow pt's progress and coordinate discharge arrangements as needed when known. ECOC on chart for MD signature.   YONG Welsh-RN

## 2022-03-31 NOTE — CONSULTS
CARDIOLOGY CONSULTATION        Patient Name: Geno Varghese  Date of admission: 3/30/2022  3:50 PM  Admission Dx: Atrial fibrillation with RVR ([address] [I48.91]  Requesting Physician: Eugene Durant MD  Primary Care physician: No primary care provider on file. Reason for Consultation/Chief Complaint: Atrial fibrillation     History of Present Illness:     Geno Varghese is a 75 y.o. patient with little past medical history (not following with a physician for over 2 decades) for who presented to the hospital 3/30/2022 with complaints of palpitations. Cardiology consulted for new diagnosis atrial fibrillation with rapid ventricular response. ED course/Vital signs on presentation: EKG showed atrial fibrillation with rapid rates 141 bpm, left posterior fascicular block, possible anterior infarct pattern. Chest x-ray showed bilateral pleural effusions, right greater than left with bibasilar opacities. Chest CT showed no PE. Cardiomegaly with severe left atrial large were noted. Small bilateral pleural effusions, right greater than left with atelectasis. Interstitial pulmonary edema. proBNP elevated at 3634. Negative serial troponins. Electrolytes and kidney function stable. CBC unremarkable. Increased dyspnea with exertion and bilateral lower extremity edema since January 2022. Palpitations ongoing x weeks-months. Described as \"Flopping\". +fatigue. She has had chest pains - described as sharp in quality. Central location. No radiation. Relieved with lying down, \"focusing her energy into her heart\"/meditation and taking crushed aspirin. No syncope. Denies paroxysmal nocturnal dyspnea, orthopnea, bendopnea. +abd bloating. Endorses increasing lower extremity edema. Worsened while on her feet throughout jan-feb while helping her brother moving and staying on her feet all the time. Past Medical History:   has a past medical history of Varicose vein.     Surgical History:   has a past surgical history that includes Tonsillectomy. Social History:   reports that she has never smoked. She does not have any smokeless tobacco history on file. She reports that she does not drink alcohol and does not use drugs. 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 Retired. Not   No children    Family History:  family history includes Cancer in her father, paternal aunt, and paternal grandmother. Brother  suddenly at 71, recently . Home Medications:  Were reviewed and are listed in nursing record and/or below  Prior to Admission medications    Medication Sig Start Date End Date Taking? Authorizing Provider   aspirin 300 MG suppository Place 300 mg rectally every 6 hours as needed. Historical Provider, MD        CURRENT Medications:  dilTIAZem 125 mg in dextrose 5 % 125 mL infusion, Continuous  potassium chloride (KLOR-CON M) extended release tablet 40 mEq, PRN   Or  potassium bicarb-citric acid (EFFER-K) effervescent tablet 40 mEq, PRN   Or  potassium chloride 10 mEq/100 mL IVPB (Peripheral Line), PRN  magnesium sulfate 2000 mg in 50 mL IVPB premix, PRN  senna (SENOKOT) tablet 8.6 mg, Daily PRN  acetaminophen (TYLENOL) tablet 650 mg, Q6H PRN   Or  acetaminophen (TYLENOL) suppository 650 mg, Q6H PRN  perflutren lipid microspheres (DEFINITY) injection 1.65 mg, ONCE PRN  ondansetron (ZOFRAN-ODT) disintegrating tablet 4 mg, Q8H PRN   Or  ondansetron (ZOFRAN) injection 4 mg, Q6H PRN  aspirin chewable tablet 162 mg, Once  aspirin EC tablet 81 mg, Daily  nitroGLYCERIN (NITROSTAT) SL tablet 0.4 mg, Q5 Min PRN  apixaban (ELIQUIS) tablet 5 mg, BID  furosemide (LASIX) injection 20 mg, BID        Allergies:  Dairycare [lactase-lactobacillus] and Eggs or egg-derived products     Review of Systems:   A 14 point review of symptoms completed. Pertinent positives identified in the HPI, all other review of symptoms negative as below.  No fevers/rigors/night sweats. No recent dental work. Objective:     Vitals:    03/30/22 2219 03/30/22 2348 03/31/22 0340 03/31/22 0803   BP: 113/64 114/66 128/76 123/73   Pulse: 96 93 83 84   Resp:  16 16 18   Temp:  97.9 °F (36.6 °C) 98.4 °F (36.9 °C) 98.1 °F (36.7 °C)   TempSrc:  Oral Oral Oral   SpO2:  94% 90% 91%   Weight:   102 lb 8 oz (46.5 kg)       Weight: 102 lb 8 oz (46.5 kg)       PHYSICAL EXAM:    General:  Alert, cooperative, no distress, appears stated age   Head:  Normocephalic, atraumatic   Eyes:  Conjunctiva/corneas clear, anicteric sclerae    Nose: Nares normal, no drainage or sinus tenderness   Throat: No abnormalities of the lips, oral mucosa or tongue. Neck: Trachea midline. Neck supple with no lymphadenopathy, thyroid not enlarged, symmetric, no tenderness/mass/nodules, Engorged neck veins. Lungs:   Crackles posterior mid lungs, wheezing L lung fields, diminished at bases   Chest Wall:  No deformity or tenderness to palpation   Heart:  Irregular, rate controlled, variable S1, normal S2, no murmur, no rub, no S3/S4, RV heave, hyperdynamic precordium. Abdomen:   Soft, non-tender, with normoactive bowel sounds. No masses, no hepatosplenomegaly   Extremities: No cyanosis, clubbing, trace pitting     Vascular: 2+ radial, 2+ dorsalis pedis and posterior tibial pulses bilaterally. Brisk carotid upstrokes without carotid bruit. Skin: Skin color, texture, turgor are normal with no rashes or ulceration. Pysch: Euthymic mood, appropriate affect   Neurologic: Oriented to person, place and time. No slurred speech or facial asymmetry. No motor or sensory deficits on gross examination.          Labs:   CBC:   Lab Results   Component Value Date    WBC 5.7 03/30/2022    RBC 4.41 03/30/2022    HGB 14.2 03/30/2022    HCT 42.2 03/30/2022    MCV 95.7 03/30/2022    RDW 14.7 03/30/2022     03/30/2022     CMP:  Lab Results   Component Value Date     03/31/2022    K 3.9 03/31/2022    CL 101 03/31/2022    CO2 27 03/31/2022    BUN 18 03/31/2022    CREATININE 0.8 03/31/2022    GFRAA >60 03/31/2022    AGRATIO 1.5 03/31/2022    LABGLOM >60 03/31/2022    GLUCOSE 100 03/31/2022    PROT 7.5 03/31/2022    CALCIUM 9.4 03/31/2022    BILITOT 0.8 03/31/2022    ALKPHOS 83 03/31/2022    AST 29 03/31/2022    ALT 18 03/31/2022     PT/INR:  No results found for: PTINR  HgBA1c:No results found for: LABA1C  Lab Results   Component Value Date    TROPONINI <0.01 03/31/2022       No results found for: CHOL  No results found for: TRIG  No results found for: HDL  No results found for: LDLCHOLESTEROL, LDLCALC  No results found for: LABVLDL, VLDL  No results found for: CHOLHDLRATIO     Cardiac Data:     EKG: Reviewed as above    Telemetry personally reviewed: AF rate controlled 80's. Echo: today  Summary   Irregular heart rate throughout study. Normal left ventricle systolic function with an estimated ejection fraction   of 55%. No regional wall motion abnormalities are seen. Diastolic dysfunction grade and filling pressure are indeterminate due to   arrhythmia. The right ventricle is normal in size and function. Gigantic left atrium. The right atrium is mildly dilated. Severely thickened mitral valve leaflets. The posterior mitral valve leaflet   appears flail with likely ruptured chordae, resulting in severe eccentric,   anteriorly-directed mitral regurgitation. Degree of left atrial enlargement   suggests chronic MR, Cannot however, rule out super-imposed   vegetation/endocarditis. Clinical correlation advised. Mild aortic valve regurgitation. Mild to moderate tricuspid valve regurgitation. Systolic pulmonary artery pressure (sPAP) is normal and estimated at 30 mmHg   (right atrial pressure 8 mmHg)   There is a small right pleural effusion. No prior studies available for comparison. Impression and Plan:      1. Atrial fibrillation, new diagnosis in the setting of #2  2.   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 Acute congestive heart failure with preserved EF related to valvular heart disease  4. Left posterior fascicular  5. Abnormal EKG  6. Dyspnea  7. Palpitations    [id number] Score for Atrial Fibrillation Stroke Risk   Risk   Factors  Component Value   C CHF No 0   H HTN No 0   A2 Age >= 76 No,  (76 y.o.) 0   D DM No 0   S2 Prior Stroke/TIA No 0   V Vascular Disease No 0   A Age 74-69 Yes,  (76 y.o.) 1   Sc Sex female 1    [id number]  Score  2   Score last updated 3/31/22 65:62 AM EDT    Click here for a link to the UpToDate guideline \"Atrial Fibrillation: Anticoagulation therapy to prevent embolization    Disclaimer: Risk Score calculation is dependent on accuracy of patient problem list and past encounter diagnosis. Patient Active Problem List   Diagnosis    Atrial fibrillation with RVR (HCC)    New onset atrial fibrillation (HCC)    Decompensated heart failure (HCC)    Pleural effusion, bilateral    LAFB (left anterior fascicular block)    Cardiomegaly    Pansystolic murmur       PLAN:  1. Start metoprolol XL 25 mg BID and wean diltiazem gtt  2. Transition DOAC to lovenox with AM hold for possible procedures  3. Lasix to 40 mg IV BID - though no significant LE edema, neck veins significantly engorged with presistent volume overload. 4. Strict I/O with external/internal catheter placement to accomplish this on case by case basis, daily standing weights, low salt/caridac diet, and CHF education recommended and discussed with the patient to guide our therapy in the inpatient setting. 5. Plan for angiogram and XAVIER to complete workup. The patient needs mitral valve repair vs. Replacement. Minimally invasive may be possible pending [facility] findings. 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 XAVIER for further evaluation.  Cath this admission with CT surgery opinion to follow. She voices strong preference to be DC to home and obtain second opinion OP, get her affairs in order prior to surgery. Will be high risk re-admission given AF, severe LAE, difficult to control rates possibly moving forward, and decomp CHF. We will need close follow up OP until time of surgery. We will follow. NPO at MN. Hold AM lovenox. I will address the patient's cardiac risk factors and adjusted pharmacologic treatment as needed. In addition, I have reinforced the need for patient directed risk factor modification. All questions and concerns were addressed to the patient/family. Alternatives to my treatment were discussed. Thank you for allowing us to participate in the care of Lety Tavarez. Please call me with any questions [phone].     Suleman Champion MD, [address] [facility]  Cardiovascular Disease  [facility], [place], operated by [facility]  [phone] [facility]  [phone] [address] [place] [address]  3/31/2022 9:43 AM

## 2022-03-31 NOTE — PLAN OF CARE
Problem: Falls - Risk of:  Goal: Will remain free from falls  Description: Will remain free from falls  3/31/2022 0913 by Danny Bernard RN  Outcome: Ongoing  Note: Pt will remain free from falls throughout hospital stay. Bed in lowest position with wheels locked and side rails 2/4 up. Hourly rounding completed. Patient ambulates safely independently, call light is within reach. Will continue to monitor throughout shift. Problem: Pain:  Goal: Pain level will decrease  Description: Pain level will decrease  Outcome: Ongoing  Note: Pt will be satisfied with pain control. Pt uses numeric pain rating scale with reassessments after pain med administration. Will continue to monitor progression throughout shift.

## 2022-03-31 NOTE — PROGRESS NOTES
4 Eyes Skin Assessment     The patient is being assess for  Admission    I agree that 2 RN's have performed a thorough Head to Toe Skin Assessment on the patient. ALL assessment sites listed below have been assessed. Areas assessed by both nurses:   [x]   Head, Face, and Ears   [x]   Shoulders, Back, and Chest  [x]   Arms, Elbows, and Hands   [x]   Coccyx, Sacrum, and Ischum  [x]   Legs, Feet, and Heels        Does the Patient have Skin Breakdown?   No         Paras Prevention initiated:  No   Wound Care Orders initiated:  No      [facility] nurse consulted for Pressure Injury (Stage 3,4, Unstageable, DTI, NWPT, and Complex wounds):  No      Nurse 1 eSignature: Electronically signed by Kaye Moss RN on 3/31/22 at 12:39 AM EDT    **SHARE this note so that the co-signing nurse is able to place an eSignature**    Nurse 2 eSignature: {Esignature:[id number]}

## 2022-03-31 NOTE — PLAN OF CARE
Problem: HEMODYNAMIC STATUS  Goal: Patient has stable vital signs and fluid balance  Outcome: Ongoing   Patients vital signs monitored every four hours    Problem: Falls - Risk of:  Goal: Will remain free from falls  Description: Will remain free from falls  Outcome: Ongoing  Patient encourage to use call light for assistance      Patient's EF (Ejection Fraction) none on file    Heart Failure Medications:  Diuretics[de-identified] Furosemide    (One of the following REQUIRED for EF </= 40%/SYSTOLIC FAILURE but MAY be used in EF% >40%/DIASTOLIC FAILURE)        ACE[de-identified] None        ARB[de-identified] None         ARNI[de-identified] None    (Beta Blockers)  NON- Evidenced Based Beta Blocker (for EF% >40%/DIASTOLIC FAILURE): None    Evidenced Based Beta Blocker::(REQUIRED for EF% <40%/SYSTOLIC FAILURE) None  . .................................................................................................................................................. Patient's weights and intake/output reviewed: Yes    Patient's Last Weight: 107 lbs obtained by standing scale. Difference of 0 lbs less than last documented weight. Intake/Output Summary (Last 24 hours) at 3/31/2022 0041  Last data filed at 3/30/2022 2212  Gross per 24 hour   Intake --   Output 500 ml   Net -500 ml       Comorbidities Reviewed Yes    Patient has a past medical history of Varicose vein. >>For CHF and Comorbidity documentation on Education Time and Topics, please see Education Tab    Progressive Mobility Assessment:  What is this patient's Current Level of Mobility?: Ambulatory-Up Ad Anisha  How was this patient Mobilized today?: Edge of Bed, Up to Chair, Bedside Commode,  Up to Toilet/Shower, Up in Room and Up in Hallway, ambulated 5 ft                 With Whom? Nurse, PCA, PT, OT and Self                 Level of Difficulty/Assistance: Independent     Pt resting in bed at this time on room air. Pt with complaints of shortness of breath. Pt without lower extremity edema.      Patient and/or Family's stated Goal of Care this Admission: better understand heart failure and disease management prior to discharge

## 2022-04-01 ENCOUNTER — APPOINTMENT (OUTPATIENT)
Dept: CARDIAC CATH/INVASIVE PROCEDURES | Age: 67
DRG: 216 | End: 2022-04-01
Payer: MEDICARE

## 2022-04-01 ENCOUNTER — ANESTHESIA EVENT (OUTPATIENT)
Dept: CARDIAC CATH/INVASIVE PROCEDURES | Age: 67
DRG: 216 | End: 2022-04-01
Payer: MEDICARE

## 2022-04-01 ENCOUNTER — ANESTHESIA (OUTPATIENT)
Dept: CARDIAC CATH/INVASIVE PROCEDURES | Age: 67
DRG: 216 | End: 2022-04-01
Payer: MEDICARE

## 2022-04-01 VITALS
TEMPERATURE: 98.6 F | SYSTOLIC BLOOD PRESSURE: 88 MMHG | DIASTOLIC BLOOD PRESSURE: 59 MMHG | RESPIRATION RATE: 24 BRPM | OXYGEN SATURATION: 100 %

## 2022-04-01 LAB
ANION GAP SERPL CALCULATED.3IONS-SCNC: 12 MMOL/L (ref 3–16)
BUN BLDV-MCNC: 18 MG/DL (ref 7–20)
CALCIUM SERPL-MCNC: 9.3 MG/DL (ref 8.3–10.6)
CHLORIDE BLD-SCNC: 97 MMOL/L (ref 99–110)
CO2: 27 MMOL/L (ref 21–32)
CREAT SERPL-MCNC: 0.7 MG/DL (ref 0.6–1.2)
EKG ATRIAL RATE: 110 BPM
EKG DIAGNOSIS: NORMAL
EKG Q-T INTERVAL: 346 MS
EKG QRS DURATION: 90 MS
EKG QTC CALCULATION (BAZETT): 474 MS
EKG R AXIS: 109 DEGREES
EKG T AXIS: -15 DEGREES
EKG VENTRICULAR RATE: 113 BPM
GFR AFRICAN AMERICAN: >60
GFR NON-AFRICAN AMERICAN: >60
GLUCOSE BLD-MCNC: 102 MG/DL (ref 70–99)
LV EF: 55 %
LVEF MODALITY: NORMAL
MAGNESIUM: 1.9 MG/DL (ref 1.8–2.4)
POTASSIUM SERPL-SCNC: 3.7 MMOL/L (ref 3.5–5.1)
SODIUM BLD-SCNC: 136 MMOL/L (ref 136–145)

## 2022-04-01 PROCEDURE — 6370000000 HC RX 637 (ALT 250 FOR IP): Performed by: HOSPITALIST

## 2022-04-01 PROCEDURE — 6360000002 HC RX W HCPCS

## 2022-04-01 PROCEDURE — 93010 ELECTROCARDIOGRAM REPORT: CPT | Performed by: INTERNAL MEDICINE

## 2022-04-01 PROCEDURE — 6360000002 HC RX W HCPCS: Performed by: INTERNAL MEDICINE

## 2022-04-01 PROCEDURE — C1894 INTRO/SHEATH, NON-LASER: HCPCS

## 2022-04-01 PROCEDURE — C1769 GUIDE WIRE: HCPCS

## 2022-04-01 PROCEDURE — 3700000000 HC ANESTHESIA ATTENDED CARE

## 2022-04-01 PROCEDURE — 2500000003 HC RX 250 WO HCPCS

## 2022-04-01 PROCEDURE — 2060000000 HC ICU INTERMEDIATE R&B

## 2022-04-01 PROCEDURE — 93460 R&L HRT ART/VENTRICLE ANGIO: CPT | Performed by: INTERNAL MEDICINE

## 2022-04-01 PROCEDURE — 2580000003 HC RX 258

## 2022-04-01 PROCEDURE — 36415 COLL VENOUS BLD VENIPUNCTURE: CPT

## 2022-04-01 PROCEDURE — 93320 DOPPLER ECHO COMPLETE: CPT

## 2022-04-01 PROCEDURE — 2709999900 HC NON-CHARGEABLE SUPPLY

## 2022-04-01 PROCEDURE — 2580000003 HC RX 258: Performed by: INTERNAL MEDICINE

## 2022-04-01 PROCEDURE — 4A023N7 MEASUREMENT OF CARDIAC SAMPLING AND PRESSURE, LEFT HEART, PERCUTANEOUS APPROACH: ICD-10-PCS | Performed by: INTERNAL MEDICINE

## 2022-04-01 PROCEDURE — 93005 ELECTROCARDIOGRAM TRACING: CPT | Performed by: NURSE PRACTITIONER

## 2022-04-01 PROCEDURE — 80048 BASIC METABOLIC PNL TOTAL CA: CPT

## 2022-04-01 PROCEDURE — 83735 ASSAY OF MAGNESIUM: CPT

## 2022-04-01 PROCEDURE — 3700000001 HC ADD 15 MINUTES (ANESTHESIA)

## 2022-04-01 PROCEDURE — 93315 ECHO TRANSESOPHAGEAL: CPT

## 2022-04-01 PROCEDURE — C1751 CATH, INF, PER/CENT/MIDLINE: HCPCS

## 2022-04-01 PROCEDURE — 93460 R&L HRT ART/VENTRICLE ANGIO: CPT

## 2022-04-01 PROCEDURE — 6370000000 HC RX 637 (ALT 250 FOR IP): Performed by: INTERNAL MEDICINE

## 2022-04-01 PROCEDURE — 93325 DOPPLER ECHO COLOR FLOW MAPG: CPT

## 2022-04-01 RX ORDER — LIDOCAINE HYDROCHLORIDE 20 MG/ML
INJECTION, SOLUTION INFILTRATION; PERINEURAL PRN
Status: DISCONTINUED | OUTPATIENT
Start: 2022-04-01 | End: 2022-04-01 | Stop reason: SDUPTHER

## 2022-04-01 RX ORDER — FENTANYL CITRATE 50 UG/ML
INJECTION, SOLUTION INTRAMUSCULAR; INTRAVENOUS
Status: COMPLETED | OUTPATIENT
Start: 2022-04-01 | End: 2022-04-01

## 2022-04-01 RX ORDER — PROPOFOL 10 MG/ML
INJECTION, EMULSION INTRAVENOUS PRN
Status: DISCONTINUED | OUTPATIENT
Start: 2022-04-01 | End: 2022-04-01 | Stop reason: SDUPTHER

## 2022-04-01 RX ORDER — SODIUM CHLORIDE 9 MG/ML
INJECTION, SOLUTION INTRAVENOUS CONTINUOUS PRN
Status: DISCONTINUED | OUTPATIENT
Start: 2022-04-01 | End: 2022-04-01 | Stop reason: SDUPTHER

## 2022-04-01 RX ORDER — ACETAMINOPHEN 325 MG/1
650 TABLET ORAL EVERY 4 HOURS PRN
Status: DISCONTINUED | OUTPATIENT
Start: 2022-04-01 | End: 2022-04-06

## 2022-04-01 RX ORDER — MIDAZOLAM HYDROCHLORIDE 5 MG/ML
INJECTION INTRAMUSCULAR; INTRAVENOUS
Status: COMPLETED | OUTPATIENT
Start: 2022-04-01 | End: 2022-04-01

## 2022-04-01 RX ORDER — SODIUM CHLORIDE 0.9 % (FLUSH) 0.9 %
5-40 SYRINGE (ML) INJECTION EVERY 12 HOURS SCHEDULED
Status: DISCONTINUED | OUTPATIENT
Start: 2022-04-01 | End: 2022-04-06

## 2022-04-01 RX ORDER — SODIUM CHLORIDE 0.9 % (FLUSH) 0.9 %
5-40 SYRINGE (ML) INJECTION PRN
Status: DISCONTINUED | OUTPATIENT
Start: 2022-04-01 | End: 2022-04-06

## 2022-04-01 RX ORDER — SODIUM CHLORIDE 9 MG/ML
25 INJECTION, SOLUTION INTRAVENOUS PRN
Status: DISCONTINUED | OUTPATIENT
Start: 2022-04-01 | End: 2022-04-06

## 2022-04-01 RX ADMIN — SODIUM CHLORIDE, PRESERVATIVE FREE 10 ML: 5 INJECTION INTRAVENOUS at 21:14

## 2022-04-01 RX ADMIN — ASPIRIN 81 MG: 81 TABLET, COATED ORAL at 08:30

## 2022-04-01 RX ADMIN — FUROSEMIDE 40 MG: 10 INJECTION, SOLUTION INTRAMUSCULAR; INTRAVENOUS at 12:43

## 2022-04-01 RX ADMIN — METOPROLOL SUCCINATE 25 MG: 25 TABLET, EXTENDED RELEASE ORAL at 08:30

## 2022-04-01 RX ADMIN — SODIUM CHLORIDE: 9 INJECTION, SOLUTION INTRAVENOUS at 11:21

## 2022-04-01 RX ADMIN — PROPOFOL 200 MG: 10 INJECTION, EMULSION INTRAVENOUS at 12:18

## 2022-04-01 RX ADMIN — NITROGLYCERIN 0.4 MG: 0.4 TABLET, ORALLY DISINTEGRATING SUBLINGUAL at 10:56

## 2022-04-01 RX ADMIN — LIDOCAINE HYDROCHLORIDE 60 MG: 20 INJECTION, SOLUTION INFILTRATION; PERINEURAL at 12:18

## 2022-04-01 RX ADMIN — FUROSEMIDE 40 MG: 10 INJECTION, SOLUTION INTRAMUSCULAR; INTRAVENOUS at 21:13

## 2022-04-01 RX ADMIN — MIDAZOLAM HYDROCHLORIDE 2 MG: 5 INJECTION INTRAMUSCULAR; INTRAVENOUS at 15:28

## 2022-04-01 RX ADMIN — FENTANYL CITRATE 25 MCG: 50 INJECTION, SOLUTION INTRAMUSCULAR; INTRAVENOUS at 15:28

## 2022-04-01 ASSESSMENT — PAIN DESCRIPTION - LOCATION: LOCATION: CHEST

## 2022-04-01 ASSESSMENT — PAIN DESCRIPTION - PAIN TYPE: TYPE: ACUTE PAIN

## 2022-04-01 ASSESSMENT — PAIN SCALES - GENERAL
PAINLEVEL_OUTOF10: 0
PAINLEVEL_OUTOF10: 7
PAINLEVEL_OUTOF10: 0
PAINLEVEL_OUTOF10: 0

## 2022-04-01 NOTE — PROGRESS NOTES
Pt complaining of chest pain, rating it 7 on 0-10 pain scale. EKG ordered, pt awaiting transport to cathlab for scheduled L+R heart cath w/XAVIER this morning. NitroGlycerin tablet 0.4 mg administered, hospitalist notified.

## 2022-04-01 NOTE — DISCHARGE INSTR - DIET
Good nutrition is important when healing from an illness, injury, or surgery. Follow any nutrition recommendations given to you during your hospital stay. If you were given an oral nutrition supplement while in the hospital, continue to take this supplement at home. You can take it with meals, in-between meals, and/or before bedtime. These supplements can be purchased at most local grocery stores, pharmacies, and chain super-stores. If you have any questions about your diet or nutrition, call the hospital and ask for the dietitian. Heart-Healthy Eating if You Are Underweight Advice from the [employer]     Why Choose a Heart-Healthy Eating Pattern if You are Underweight? A body mass index, or BMI, less than 18.5 means you are underweight. Your health care provider   can help you figure out your BMI. You can also use a BMI calculator at www.[employer]. Some people have a lower body weight naturally and are healthy at this weight. But if you are underweight from not eating enough, or from illness or stress, you may be at risk for health problems. Your health care provider can find out if you have health problems at your check-ups. If you have a low BMI, the goal is to gain weight in a healthy way. Choose high-calorie foods that are also heart-healthy. It may also help if you eat smaller meals and healthy snacks more often. Light physical activity like walking will help your overall health. Light strength-training exercise can protect your bones and muscles. Try the steps below to gain weight in a heart-healthy way.   Tips for Gaining Weight in a Heart-Healthy Way      Start slow and keep a food journal   - Write down what you eat for 3 days   - Look to see where you can add foods to boost calories     Eat meals more often   - Try to eat 5-6 healthy meals and snacks each day   - Make meals with lean protein and low-fat dairy foods, raw or unsalted nuts, whole grain breads and cereals, vegetables, and fruits     Save drinks for the end of meals   - Drink liquids at the end of meals to avoid feeling full quickly   - Avoid sugary or low-calorie drinks with little nutrition   - Choose low-fat milk or 100% fruit juice     Be active each day   - Do light aerobic exercises like walking   - Try to do strength training with light weights      Eat healthy, high-calorie snacks   - Snack on dried fruit and granola   - Try meal replacement drinks or bars   - Make fruit and veggie smoothies with low-fat plain yogurt, nut butters, and/or avocado      Add healthy fats to meals and snacks   - Use canola oil or extra-virgin olive oil with vegetables, pastas, and salads   - Add avocados to meals and snacks   - Snack on unsalted nuts and seeds and natural nut butters       Meal and Snack Ideas for Gaining Weight in a Healthy Way   Breakfast - Granola with Plain Yogurt + Berries + Raw or Unsalted Nuts OR   Scrambled Egg with 1 oz. Cheese, Whole-Grain Toast with 1 Tbsp. [place] Butter + Fruit   Mid-Morning Snack - Whole-Grain Bread with 1 Tbsp. Peanut Butter OR   Avocado Smoothie made with Fruit and Plain Low-Fat Yogurt   Lunch - Grilled Chicken [place] on Whole-Grain Bread with Lettuce, Tomato, and Hummus + Fresh Fruit   Mid-Afternoon Snack - Dried Fruit + Raw or Unsalted Nuts or Peanuts   Dinner - Grilled [place] with Yogurt-Dill Sauce + Wild Rice + Asparagus grilled in Extra-Virgin Olive Oil OR   Whole-Wheat Pasta + Ground [place] + Mixed Green Salad with [place]   Snack - 1 oz. Skim-Milk Mozzarella Cheese Stick + Whole-Grain Crackers + Fresh Fruit OR 1 oz. unsalted nuts     A registered dietitian nutritionist can help you make a heart-healthy meal plan that works best for your lifestyle, and support you in your journey to a healthful dietary pattern.        Dietitian Office at [facility]: [phone]

## 2022-04-01 NOTE — ANESTHESIA POSTPROCEDURE EVALUATION
Department of Anesthesiology  Postprocedure Note    Patient: Paulette Sullivan  MRN: [id number]  YOB: 1955  Date of evaluation: 4/1/2022  Time:  4:07 PM     Procedure Summary     Date: 04/01/22 Room / Location: [facility] Cardiac Cath Lab    Anesthesia Start: 1215 Anesthesia Stop: 6113    Procedure: TRANSESOPHAGEAL ECHO Diagnosis:     Scheduled Providers:  Responsible Provider: Fred Pierre MD    Anesthesia Type: MAC ASA Status: 3          Anesthesia Type: MAC    Daniel Phase I:      Daniel Phase II:      Last vitals: Reviewed and per EMR flowsheets.        Anesthesia Post Evaluation    Comments: Postoperative Anesthesia Note    Name:    Paulette Sullivan  MRN:      [id number]    Patient Vitals in the past 12 hrs:  04/01/22 1500, Height:5' (1.524 m)  04/01/22 1045, BP:116/80, Pulse:125, Resp:16, SpO2:94 %  04/01/22 0800, BP:122/83, Temp:98.2 °F (36.8 °C), Temp src:Oral, Pulse:122, Resp:16, SpO2:94 %  04/01/22 0539, Weight:99 lb 9.6 oz (45.2 kg)     LABS:    CBC  Lab Results       Component                Value               Date/Time                  WBC                      5.7                 03/30/2022 04:05 PM        HGB                      14.2                03/30/2022 04:05 PM        HCT                      42.2                03/30/2022 04:05 PM        PLT                      192                 03/30/2022 04:05 PM   RENAL  Lab Results       Component                Value               Date/Time                  NA                       136                 04/01/2022 06:32 AM        K                        3.7                 04/01/2022 06:32 AM        K                        3.9                 03/31/2022 06:44 AM        CL                       97 (L)              04/01/2022 06:32 AM        CO2                      27                  04/01/2022 06:32 AM        BUN                      18                  04/01/2022 06:32 AM        CREATININE               0.7                 04/01/2022 06:32 AM GLUCOSE                  102 (H)             04/01/2022 06:32 AM   COAGS  Lab Results       Component                Value               Date/Time                  PROTIME                  14.7 (H)            03/30/2022 08:51 PM        INR                      1.29 (H)            03/30/2022 08:51 PM     Intake & Output: In: 1128.8 (P.O.:840; I.V.:288.8)  Out: 3300 (Urine:3300)    Nausea & Vomiting:  No    Level of Consciousness:  Awake    Pain Assessment:  Adequate analgesia    Anesthesia Complications:  No apparent anesthetic complications    SUMMARY      Vital signs stable  OK to discharge from Stage I post anesthesia care.   Care transferred from Anesthesiology department on discharge from perioperative area

## 2022-04-01 NOTE — CARE COORDINATION
Chart reviewed, pt discussed during huddle with primary RN, pt in cath lab. Pt on IV lasix and in Afib, possible discharge over the weekend, no discharge needs noted again at this time. CM will continue to follow pt's progress and coordinate discharge arrangements as needed.   YONG Garibay-RN

## 2022-04-01 NOTE — H&P
Brief Pre-Op Note/Sedation Assessment      Enoch Deluca  1955  Cath Pool Rm/NONE      [id number]  11:45 AM    Planned Procedure: XAVIER, Right and left Cardiac Catheterization Procedure     Post Procedure Plan: Return to same level of care    Consent: I have discussed with the patient and/or the patient representative the indication, alternatives, and the possible risks and/or complications of the planned procedure and the anesthesia methods. The patient and/or patient representative appear to understand and agree to proceed. Chief Complaint: Dyspnea, CHF, Severe MR      Indications for Cath Procedure:  Cardiac Arrythmia and Other  Anginal Classification within 2 weeks:  No symptoms  NYHA Heart Failure Class within 2 weeks: Class IV - Symptoms of HF at rest, Newly Diagnosed? Yes, Heart Failure Type: Diastolic  Is Cath Lab Visit Valve-related?: Mitral Regurgitation; Regurgitation Severity: Severe (4+)  Surgical Risk: N/A  Functional Type: N/A    Anti- Anginal Meds within 2 weeks:   Yes: Beta Blockers and Aspirin    Stress or Imaging Studies Performed:  None     Vital Signs:  /80   Pulse 125   Temp 98.2 °F (36.8 °C) (Oral)   Resp 16   Wt 99 lb 9.6 oz (45.2 kg)   SpO2 94%     Allergies:   Allergies   Allergen Reactions    Dairycare [Lactase-Lactobacillus]     Eggs Or Egg-Derived Products     Lactose Intolerance (Gi)        Past Medical History:  Past Medical History:   Diagnosis Date    Varicose vein          Surgical History:  Past Surgical History:   Procedure Laterality Date    TONSILLECTOMY           Medications:  Current Facility-Administered Medications   Medication Dose Route Frequency Provider Last Rate Last Admin    furosemide (LASIX) injection 40 mg  40 mg IntraVENous BID Grace Kenyon MD   40 mg at 03/31/22 1716    enoxaparin (LOVENOX) injection 50 mg  1 mg/kg SubCUTAneous BID Grace Kenyon MD   50 mg at 03/31/22 2230    metoprolol succinate (TOPROL XL) extended release tablet 25 mg 25 mg Oral Daily Sarah Mccoy MD   25 mg at 04/01/22 0830    butalbital-acetaminophen-caffeine (FIORICET, ESGIC) per tablet 1 tablet  1 tablet Oral Q4H PRN CHARLES Galvin - CNP   1 tablet at 03/31/22 1244    dilTIAZem 125 mg in dextrose 5 % 125 mL infusion  2.5-15 mg/hr IntraVENous Continuous Florin Leonard MD   Stopped at 03/31/22 1339    potassium chloride (KLOR-CON M) extended release tablet 40 mEq  40 mEq Oral PRN Florin Leonard MD        Or    potassium bicarb-citric acid (EFFER-K) effervescent tablet 40 mEq  40 mEq Oral PRN Florin Leonard MD        Or   Daniele Gonzales potassium chloride 10 mEq/100 mL IVPB (Peripheral Line)  10 mEq IntraVENous PRN Florin Leonard MD        magnesium sulfate 2000 mg in 50 mL IVPB premix  2,000 mg IntraVENous PRN Florin Leonard MD        senna (SENOKOT) tablet 8.6 mg  1 tablet Oral Daily PRN Florin Leonard MD        acetaminophen (TYLENOL) tablet 650 mg  650 mg Oral Q6H PRN Florin Leonard MD   650 mg at 03/31/22 0410    Or    acetaminophen (TYLENOL) suppository 650 mg  650 mg Rectal Q6H PRN Florin Leonard MD        perflutren lipid microspheres (DEFINITY) injection 1.65 mg  1.5 mL IntraVENous ONCE PRN Florin Leonard MD        ondansetron (ZOFRAN-ODT) disintegrating tablet 4 mg  4 mg Oral Q8H PRN Florin Leonard MD        Or    ondansetron [facility] injection 4 mg  4 mg IntraVENous Q6H PRN Florin Leonard MD        aspirin chewable tablet 162 mg  162 mg Oral Once Florin Leonard MD        aspirin EC tablet 81 mg  81 mg Oral Daily Florin Leonard MD   81 mg at 04/01/22 0830    nitroGLYCERIN (NITROSTAT) SL tablet 0.4 mg  0.4 mg SubLINGual Q5 Min PRN Florin Leonard MD   0.4 mg at 04/01/22 1056    [Held by provider] apixaban (ELIQUIS) tablet 5 mg  5 mg Oral BID Florin Leonard MD   5 mg at 03/30/22 2050           Pre-Sedation:    Pre-Sedation Documentation and Exam:  I have personally completed a history, physical exam & review of systems for this patient (see notes). Prior History of Anesthesia Complications:   none    Modified Mallampati:  I (soft palate, uvula, fauces, tonsillar pillars visible)    ASA Classification:  Class 4 - A patient with an incapacitating systemic disease that is a constant threat to life      Daniel Scale: Activity:  2 - Able to move 4 extremities voluntarily on command  Respiration:  2 - Able to breathe deeply and cough freely  Circulation:  2 - BP+/- 20mmHg of normal  Consciousness:  2 - Fully awake  Oxygen Saturation (color):  2 - Able to maintain oxygen saturation >92% on room air    Sedation/Anesthesia Plan:  Guard the patient's safety and welfare. Minimize physical discomfort and pain. Minimize negative psychological responses to treatment by providing sedation and analgesia and maximize the potential amnesia. Patient to meet pre-procedure discharge plan. Medication Planned:  XAVIER - per anesthesia  [facility] moderate sedation    Patient is an appropriate candidate for plan of sedation: yes    I discussed the risks/benefits/alternatives of XAVIER and cardiac catheterization today. Risks discussed in detail including infection, bleeding, need for transfusion, vessel injury, internal bleeding, valvular damage, cardiac perforation, pericardial effusion, dissection, pneumothorax, allergic reaction to medications/contrast, kidney damage/need for dialysis, emergent cardiac or vascular surgery, risk of myocardial infarction, stroke, or death. The patient accepts these risks and would like to proceed.        Electronically signed by Zaira Muñiz MD on 4/1/2022 at 11:45 AM

## 2022-04-01 NOTE — ANESTHESIA PRE PROCEDURE
Department of Anesthesiology  Preprocedure Note       Name:  Maryann Meier   Age:  75 y.o.  :  1955                                          MRN:  [id number]         Date:  2022      Surgeon: * Surgery not found *    Procedure:     Medications prior to admission:   Prior to Admission medications    Medication Sig Start Date End Date Taking? Authorizing Provider   aspirin 300 MG suppository Place 300 mg rectally every 6 hours as needed.     Historical Provider, MD       Current medications:    Current Facility-Administered Medications   Medication Dose Route Frequency Provider Last Rate Last Admin    furosemide (LASIX) injection 40 mg  40 mg IntraVENous BID Erickson Irving MD   40 mg at 22 1716    enoxaparin (LOVENOX) injection 50 mg  1 mg/kg SubCUTAneous BID Erickson Irving MD   50 mg at 22 2230    metoprolol succinate (TOPROL XL) extended release tablet 25 mg  25 mg Oral Daily Erickson Irving MD   25 mg at 22 0830    butalbital-acetaminophen-caffeine (FIORICET, ESGIC) per tablet 1 tablet  1 tablet Oral Q4H PRN CHARLES Kumar - CNP   1 tablet at 22 1244    dilTIAZem 125 mg in dextrose 5 % 125 mL infusion  2.5-15 mg/hr IntraVENous Continuous Domonique Valdez MD   Stopped at 22 1339    potassium chloride (KLOR-CON M) extended release tablet 40 mEq  40 mEq Oral PRN Domonique Valdez MD        Or    potassium bicarb-citric acid (EFFER-K) effervescent tablet 40 mEq  40 mEq Oral PRN Domonique Valdez MD        Or   Toney Polo potassium chloride 10 mEq/100 mL IVPB (Peripheral Line)  10 mEq IntraVENous PRN Domonique Valdez MD        magnesium sulfate 2000 mg in 50 mL IVPB premix  2,000 mg IntraVENous PRN Domonique Valdez MD        senna (SENOKOT) tablet 8.6 mg  1 tablet Oral Daily PRN Domonique Valdez MD        acetaminophen (TYLENOL) tablet 650 mg  650 mg Oral Q6H PRN Domonique Valdez MD   650 mg at 22 0410    Or    acetaminophen (TYLENOL) suppository 650 mg  650 mg Rectal Q6H PRN Salina Sanchez MD        perflutren lipid microspheres (DEFINITY) injection 1.65 mg  1.5 mL IntraVENous ONCE PRN Salina Sanchez MD        ondansetron (ZOFRAN-ODT) disintegrating tablet 4 mg  4 mg Oral Q8H PRN Salina Sanchez MD        Or    [facility]dansetron [facility] injection 4 mg  4 mg IntraVENous Q6H PRN Salina Sanchez MD        aspirin chewable tablet 162 mg  162 mg Oral Once Salina Sanchez MD        aspirin EC tablet 81 mg  81 mg Oral Daily Salina Sanchez MD   81 mg at 04/01/22 0830    nitroGLYCERIN (NITROSTAT) SL tablet 0.4 mg  0.4 mg SubLINGual Q5 Min PRN Salina Sanchez MD   0.4 mg at 04/01/22 1056    [Held by provider] apixaban (ELIQUIS) tablet 5 mg  5 mg Oral BID Salina Sanchez MD   5 mg at 03/30/22 2050       Allergies: Allergies   Allergen Reactions    Dairycare [Lactase-Lactobacillus]     Eggs Or Egg-Derived Products     Lactose Intolerance (Gi)        Problem List:    Patient Active Problem List   Diagnosis Code    Atrial fibrillation with RVR (HCC) I48.91    New onset atrial fibrillation (HCC) I48.91    Decompensated heart failure (HCC) I50.9    Pleural effusion, bilateral J90    LAFB (left anterior fascicular block) I44.4    Cardiomegaly R43.4    Pansystolic murmur M48.5       Past Medical History:        Diagnosis Date    Varicose vein        Past Surgical History:        Procedure Laterality Date    TONSILLECTOMY         Social History:    Social History     Tobacco Use    Smoking status: Never Smoker    Smokeless tobacco: Not on file   Substance Use Topics    Alcohol use:  No                                Counseling given: Not Answered      Vital Signs (Current):   Vitals:    04/01/22 0341 04/01/22 0539 04/01/22 0800 04/01/22 1045   BP: 121/81  122/83 116/80   Pulse: 123  122 125   Resp: 16  16 16   Temp: 98.1 °F (36.7 °C)  98.2 °F (36.8 °C)    TempSrc: Oral  Oral    SpO2: 93%  94% 94%   Weight:  99 lb 9.6 oz (45.2 kg) BP Readings from Last 3 Encounters:   04/01/22 116/80   08/17/10 98/56       NPO Status:                                                                                 BMI:   Wt Readings from Last 3 Encounters:   04/01/22 99 lb 9.6 oz (45.2 kg)   08/17/10 105 lb (47.6 kg)     There is no height or weight on file to calculate BMI.    CBC:   Lab Results   Component Value Date    WBC 5.7 03/30/2022    RBC 4.41 03/30/2022    HGB 14.2 03/30/2022    HCT 42.2 03/30/2022    MCV 95.7 03/30/2022    RDW 14.7 03/30/2022     03/30/2022       CMP:   Lab Results   Component Value Date     04/01/2022    K 3.7 04/01/2022    K 3.9 03/31/2022    CL 97 04/01/2022    CO2 27 04/01/2022    BUN 18 04/01/2022    CREATININE 0.7 04/01/2022    GFRAA >60 04/01/2022    AGRATIO 1.5 03/31/2022    LABGLOM >60 04/01/2022    GLUCOSE 102 04/01/2022    PROT 7.5 03/31/2022    CALCIUM 9.3 04/01/2022    BILITOT 0.8 03/31/2022    ALKPHOS 83 03/31/2022    AST 29 03/31/2022    ALT 18 03/31/2022       POC Tests: No results for input(s): POCGLU, POCNA, POCK, POCCL, POCBUN, POCHEMO, POCHCT in the last 72 hours.     Coags:   Lab Results   Component Value Date    PROTIME 14.7 03/30/2022    INR 1.29 03/30/2022       HCG (If Applicable): No results found for: PREGTESTUR, PREGSERUM, HCG, HCGQUANT     ABGs: No results found for: PHART, PO2ART, OCZ2LFF, XWK1FAG, BEART, M6IHWNMK     Type & Screen (If Applicable):  No results found for: LABABO, LABRH    Drug/Infectious Status (If Applicable):  No results found for: HIV, HEPCAB    COVID-19 Screening (If Applicable): No results found for: COVID19        Anesthesia Evaluation  Patient summary reviewed and Nursing notes reviewed no history of anesthetic complications:   Airway: Mallampati: II     Neck ROM: full   Dental:          Pulmonary:                              Cardiovascular:    (+) dysrhythmias: atrial fibrillation,                   Neuro/Psych:               GI/Hepatic/Renal: Endo/Other:                     Abdominal:             Vascular: Other Findings:             Anesthesia Plan      MAC     ASA 3     (Medications & allergies reviewed  All available lab & EKG data reviewed)  Induction: intravenous. Anesthetic plan and risks discussed with patient. Plan discussed with CRNA.                   Oswaldo Allen MD   4/1/2022

## 2022-04-01 NOTE — PROCEDURES
CARDIAC CATHETERIZATION REPORT    Date of Procedure: 4/1/22  : Ana Hidalgo MD   Primary Indication: Severe mitral regurgitation     Procedures Performed:  1. Coronary angiography  2. Left heart catheterization  3. Right heart catheterization    Procedural Details:  1. Access: Local anesthetic was given and access was obtained in the right radial artery using a micropuncture technique and a 5F Terumo Slender Sheath was placed without difficulty. A 7F Terumo Slender Sheath was placed in the right brachial vein via wire exchange of an existing IV that was placed under sterile conditions. 2. Diagnostic: A 7F TD Debi Payor catheter was used to perform the right heart catheterization. A 5F JR4 catheter and 5F JL3.5 catheter were used to perform selective right and left coronary angiography, respectively. A 5F Pigtail catheter was used to perform the left heart catheterization. No significant gradient was observed on pull-back of the catheter across the aortic valve. 3. Hemostasis: At the end of the procedure, the radial sheath was removed and a hemoband was placed over the arteriotomy site and filled with 15 cc air to maintain hemostasis. The venous sheath was moved and manual pressure applied to maintain hemostasis. Findings:  1. Hemodynamics:  A. Right heart catheterization                   1. RA:  6 mmHg                   2. RV:  34/5/7 mmHG                   3. PA:  37/18/27 mmHG                   4. PCWP: 22 mmHg                   5. Ignacio CO: 3.9 L/min                   6. Ignacio CI: 2.75  L/min*m2                   7. Ignacio SVR: 1571 d/s                   8. Transpulmonary gradient: 5       9. Ignacio PVR: 1 wood unit     B. Opening arterial pressure: 103/67/82      C. LVEDP: 15 mmhg  PA sat 71%  PW sat 95%  RA sat 70%  Ao sat 97% (on NRB at time of draw). 2.  Coronary anatomy:  A.  Left main artery: The left main artery bifurcates into the left anterior descending artery and left circumflex artery. The left main artery was normal.   B. Left anterior descending artery: Transapical vessel which gives rise to 2 diagonal arteries. The left anterior descending artery had no disease. Diminutive vessel distally. The diagonal arteries had no disease. C. Left circumflex artery: Non-dominant vessel that gives rise to 2 obtuse marginal arteries. The left circumflex artery had no disease. The first obtuse marginal artery had no diease. The second marginal artery was tortuous with no disease. D. Right coronary artery: Large Dominant vessel that gives rise to the posterior descending artery and posterolateral branch. The right coronary artery had no disease. The posterior descending artery had no disease. The posterolateral branch had no disease. Technical Factors:  Complications:  None  Estimated blood loss: <15cc  Sedation: Moderate conscious sedation was administered by qaulified nursing personnel under continuous hemodynamic monitoring, starting at 15:27 and ending at 16:46. Medications: 2 mg Versed, 25 mcg Fentanyl  Contrast: 100 cc of Isovue     Impression:  1. Normal coronaries. 2.  High-Normal left ventricular filling pressure. 3.  Normal cardiac index  4. Elevated wedge pressure - difficulty wedging (95% sat however, at time of recording). 5.  Mildly elevated pulmonary pressure/right sided filling pressure      Plan:  1. One more day of IV diuresis and consider transition to oral diuretic. 2.  Continue metoprolol, aspirin   3. CT surgery to evaluate for minimally invasive mitral valve repair. My preference would be that the patient has inpatient surgery early next week as she presented with decompensated congestive heart failure and atrial fibrillation as a result of her severe MR. This will prove difficult to manage on OP basis. She may however, seek second opinion and will need close follow up if choose to do so with follow up with our service in 1 week on discharge. Lesia Massey MD, [facility]

## 2022-04-01 NOTE — PLAN OF CARE
Problem: OXYGENATION/RESPIRATORY FUNCTION  Goal: Patient will maintain patent airway  Outcome: Met This Shift  Goal: Patient will achieve/maintain normal respiratory rate/effort  Description: Respiratory rate and effort will be within normal limits for the patient  Outcome: Met This Shift     Problem: HEMODYNAMIC STATUS  Goal: Patient has stable vital signs and fluid balance  Outcome: Met This Shift     Problem: FLUID AND ELECTROLYTE IMBALANCE  Goal: Fluid and electrolyte balance are achieved/maintained  Outcome: Met This Shift     Problem: ACTIVITY INTOLERANCE/IMPAIRED MOBILITY  Goal: Mobility/activity is maintained at optimum level for patient  Outcome: Met This Shift     Problem: Falls - Risk of:  Goal: Will remain free from falls  Description: Will remain free from falls  Outcome: Met This Shift       Patient's EF (Ejection Fraction) is greater than 40%    Heart Failure Medications:  Diuretics[de-identified] Furosemide    (One of the following REQUIRED for EF </= 40%/SYSTOLIC FAILURE but MAY be used in EF% >40%/DIASTOLIC FAILURE)        ACE[de-identified] None        ARB[de-identified] None         ARNI[de-identified] None    (Beta Blockers)  NON- Evidenced Based Beta Blocker (for EF% >40%/DIASTOLIC FAILURE): None    Evidenced Based Beta Blocker::(REQUIRED for EF% <40%/SYSTOLIC FAILURE) Metoprolol SUCCinate- Toprol XL  . .................................................................................................................................................. Patient's weights and intake/output reviewed: Yes    Patient's Last Weight: 102 lbs obtained by standing scale. Difference of 5 lbs less than last documented weight. Intake/Output Summary (Last 24 hours) at 4/1/2022 0428  Last data filed at 4/1/2022 0147  Gross per 24 hour   Intake 1028.8 ml   Output 3400 ml   Net -2371.2 ml       Comorbidities Reviewed Yes    Patient has a past medical history of Varicose vein.      >>For CHF and Comorbidity documentation on Education Time and Topics, please see

## 2022-04-01 NOTE — CONSULTS
Nutrition Note    RD received consult for CHF nutrition education. Pt unavailable this date, in cath lab. CHF diet education added to AVS that discusses low sodium, fluid restriction and weight monitoring. RD to provide verbal diet education per pt and RD availability. Will continue to monitor per nutrition standards of care.      Chichi Kline MS, RD, LD on 4/1/2022 at 2:39 PM  Office: [phone] No

## 2022-04-01 NOTE — PROGRESS NOTES
Hospitalist Progress Note      PCP: Domenico Dupont MD    Date of Admission: 3/30/2022    Chief Complaint: [facility] Course: Nereida Lozano is a 75 y.o. female who presented to the ED to be evaluated for a several year history of heart palpitations, which became associated with weakness and dyspnea 3 months PTA. The patient reports that she has not been seen by any type of physician for greater than 2 decades. She is scheduled a new PCP appointment for earlier today, but when she arrived she was tachycardic into the 150s therefore EMS was contacted to transport her to the ED. Upon further questioning, patient endorses intermittent chest pain for which she takes a baby aspirin PRN. She states that she has noted increased THOMPSON as well as BLE edema ongoing since the beginning of 2022.     Upon arrival to the ED EKG was obtained revealing A. fib RVR with ventricular rate 141; PVCs, anterior infarct of unknown duration, L APB, and QTC prolongation also appreciated. CXR notable for bilateral pleural effusions as well as bibasilar opacities. Admitting hospitalist physician has ordered chest CT with PE protocol to further assess. Labs were essentially unremarkable excluding BNP 3634. Patient received IV Cardizem bolus and drip initiation per ED provider. She will be admitted for new onset A. fib with suspected decompensated CHF. Subjective: Headache improved, plan for LHC/RHC and XAVIER today.       Medications:  Reviewed    Infusion Medications    dilTIAZem Stopped (03/31/22 1339)     Scheduled Medications    furosemide  40 mg IntraVENous BID    enoxaparin  1 mg/kg SubCUTAneous BID    metoprolol succinate  25 mg Oral Daily    aspirin  162 mg Oral Once    aspirin  81 mg Oral Daily    [Held by provider] apixaban  5 mg Oral BID     PRN Meds: butalbital-acetaminophen-caffeine, potassium chloride **OR** potassium alternative oral replacement **OR** potassium chloride, magnesium sulfate, senna, acetaminophen **OR** acetaminophen, perflutren lipid microspheres, ondansetron **OR** ondansetron, nitroGLYCERIN      Intake/Output Summary (Last 24 hours) at 4/1/2022 9319  Last data filed at 4/1/2022 0147  Gross per 24 hour   Intake 1028.8 ml   Output 3100 ml   Net -2071.2 ml       Physical Exam Performed:    /81   Pulse 123   Temp 98.1 °F (36.7 °C) (Oral)   Resp 16   Wt 99 lb 9.6 oz (45.2 kg)   SpO2 93%     General appearance: No apparent distress, appears stated age and cooperative. HEENT: Pupils equal, round, and reactive to light. Conjunctivae/corneas clear. Neck: Supple, with full range of motion. No jugular venous distention. Trachea midline. Respiratory:  Normal respiratory effort. Clear to auscultation, bilaterally without Rales/Wheezes/Rhonchi. Cardiovascular: Irregular rate and rhythm with BAY  Abdomen: Soft, non-tender, non-distended with normal bowel sounds. Musculoskeletal: No clubbing, cyanosis or edema bilaterally. Full range of motion without deformity. Skin: Skin color, texture, turgor normal.  No rashes or lesions. Neurologic:  Neurovascularly intact without any focal sensory/motor deficits.  Cranial nerves: II-XII intact, grossly non-focal.  Psychiatric: Alert and oriented, thought content appropriate, normal insight  Capillary Refill: Brisk,3 seconds, normal   Peripheral Pulses: +2 palpable, equal bilaterally       Labs:   Recent Labs     03/30/22  1605   WBC 5.7   HGB 14.2   HCT 42.2        Recent Labs     03/30/22  1605 03/31/22  0644 04/01/22  0632    141 136   K 4.7 3.9 3.7   CL 99 101 97*   CO2 27 27 27   BUN 18 18 18   CREATININE 0.8 0.8 0.7   CALCIUM 9.5 9.4 9.3     Recent Labs     03/30/22  1605 03/31/22  0644   AST 31 29   ALT 19 18   BILITOT 0.6 0.8   ALKPHOS 89 83     Recent Labs     03/30/22 2051   INR 1.29*     Recent Labs     03/30/22  1605 03/30/22 2051 03/31/22  0037   TROPONINI <0.01 <0.01 <0.01       Urinalysis:      Lab Results   Component Value Date    NITRU Negative 03/30/2022    BLOODU Negative 03/30/2022    SPECGRAV 1.010 03/30/2022    GLUCOSEU Negative 03/30/2022       Radiology:  CT CHEST PULMONARY EMBOLISM W CONTRAST   Final Result   No evidence of pulmonary embolism      Cardiomegaly, including severe left atrial enlargement. Small bilateral pleural effusions, right greater than left, with associated   atelectasis. Mild smooth interlobular septal thickening, favored to represent mild   interstitial pulmonary edema.          XR CHEST PORTABLE   Final Result   Bilateral pleural effusions right greater than left with bibasilar opacities   that could represent atelectasis or pneumonia                 Assessment/Plan:    Active Hospital Problems    Diagnosis     Atrial fibrillation with RVR ([address] [I48.91]     New onset atrial fibrillation (HCC) [I48.91]     Decompensated heart failure ([address] [I50.9]     Pleural effusion, bilateral [J90]     LAFB (left anterior fascicular block) [I44.4]     Cardiomegaly [T70.7]     Pansystolic murmur [B93.6]      New onset A. fib with RVR  -Tele  -Patient initiated on IV Cardizem bolus and drip, the latter of which will be continued overnight  -Electrolytes, thyroid studies to assess for secondary causes of this tachyarrhythmia  -Patient with CHADS2 score of 3, Eliquis initiated-however d/c'd this morning after Echo findings-start lovenox tx dose  -Echo: EF 55%, severe MR, mild AR, mild to mod TR  -Cardiology consulted     Chest pain   -ASA daily, BB  -FLP , HDL 70, Cholesterol 185; patient declined statin this evening  -Nitropaste applied to chest wall (BP permitting)  -Nuclear stress test not ordered at this time due to A. fib RVR presentation  -Serial troponin negative     Pulmonary edema  -Suspect decompensated CHF, although patient with no previous cardiac history or echo available for review  -Chest CT with PE protocol ordered revealing massive cardiomegaly with severe LAE enlargement  -Admit to floor for continuous telemetry monitoring and strict I's & O's  -IV Lasix 40 mg initiated Q12 hours  -2G Na and fluid restriction dietary modifications in place    DVT Prophylaxis: Lovenox  Diet: Diet NPO  Code Status: Full Code    PT/OT Eval Status: ambulatory    Dispo - pending course 1-2 days depending on XAVIER, Cath    Ortega Villafana, APRN - CNP

## 2022-04-02 LAB
ANION GAP SERPL CALCULATED.3IONS-SCNC: 12 MMOL/L (ref 3–16)
BUN BLDV-MCNC: 29 MG/DL (ref 7–20)
CALCIUM SERPL-MCNC: 9.1 MG/DL (ref 8.3–10.6)
CHLORIDE BLD-SCNC: 95 MMOL/L (ref 99–110)
CO2: 30 MMOL/L (ref 21–32)
CREAT SERPL-MCNC: 0.9 MG/DL (ref 0.6–1.2)
GFR AFRICAN AMERICAN: >60
GFR NON-AFRICAN AMERICAN: >60
GLUCOSE BLD-MCNC: 84 MG/DL (ref 70–99)
HCT VFR BLD CALC: 42.9 % (ref 36–48)
HEMOGLOBIN: 14.4 G/DL (ref 12–16)
MAGNESIUM: 1.9 MG/DL (ref 1.8–2.4)
MCH RBC QN AUTO: 32 PG (ref 26–34)
MCHC RBC AUTO-ENTMCNC: 33.5 G/DL (ref 31–36)
MCV RBC AUTO: 95.7 FL (ref 80–100)
PDW BLD-RTO: 14.3 % (ref 12.4–15.4)
PLATELET # BLD: 195 K/UL (ref 135–450)
PMV BLD AUTO: 7.4 FL (ref 5–10.5)
POTASSIUM SERPL-SCNC: 4.1 MMOL/L (ref 3.5–5.1)
RBC # BLD: 4.49 M/UL (ref 4–5.2)
SODIUM BLD-SCNC: 137 MMOL/L (ref 136–145)
WBC # BLD: 6.5 K/UL (ref 4–11)

## 2022-04-02 PROCEDURE — 2580000003 HC RX 258: Performed by: INTERNAL MEDICINE

## 2022-04-02 PROCEDURE — 6370000000 HC RX 637 (ALT 250 FOR IP): Performed by: NURSE PRACTITIONER

## 2022-04-02 PROCEDURE — 6370000000 HC RX 637 (ALT 250 FOR IP): Performed by: HOSPITALIST

## 2022-04-02 PROCEDURE — 80048 BASIC METABOLIC PNL TOTAL CA: CPT

## 2022-04-02 PROCEDURE — 99233 SBSQ HOSP IP/OBS HIGH 50: CPT | Performed by: NURSE PRACTITIONER

## 2022-04-02 PROCEDURE — 2060000000 HC ICU INTERMEDIATE R&B

## 2022-04-02 PROCEDURE — 85027 COMPLETE CBC AUTOMATED: CPT

## 2022-04-02 PROCEDURE — 6360000002 HC RX W HCPCS: Performed by: INTERNAL MEDICINE

## 2022-04-02 PROCEDURE — 36415 COLL VENOUS BLD VENIPUNCTURE: CPT

## 2022-04-02 PROCEDURE — 83735 ASSAY OF MAGNESIUM: CPT

## 2022-04-02 RX ORDER — METOPROLOL SUCCINATE 25 MG/1
25 TABLET, EXTENDED RELEASE ORAL 2 TIMES DAILY
Status: DISCONTINUED | OUTPATIENT
Start: 2022-04-02 | End: 2022-04-03

## 2022-04-02 RX ADMIN — FUROSEMIDE 40 MG: 10 INJECTION, SOLUTION INTRAMUSCULAR; INTRAVENOUS at 18:04

## 2022-04-02 RX ADMIN — ENOXAPARIN SODIUM 50 MG: 60 INJECTION SUBCUTANEOUS at 21:24

## 2022-04-02 RX ADMIN — SODIUM CHLORIDE, PRESERVATIVE FREE 10 ML: 5 INJECTION INTRAVENOUS at 21:26

## 2022-04-02 RX ADMIN — SODIUM CHLORIDE, PRESERVATIVE FREE 10 ML: 5 INJECTION INTRAVENOUS at 09:12

## 2022-04-02 RX ADMIN — METOPROLOL SUCCINATE 25 MG: 25 TABLET, EXTENDED RELEASE ORAL at 22:01

## 2022-04-02 RX ADMIN — FUROSEMIDE 40 MG: 10 INJECTION, SOLUTION INTRAMUSCULAR; INTRAVENOUS at 09:11

## 2022-04-02 RX ADMIN — ASPIRIN 81 MG: 81 TABLET, COATED ORAL at 09:11

## 2022-04-02 ASSESSMENT — PAIN SCALES - GENERAL
PAINLEVEL_OUTOF10: 0

## 2022-04-02 NOTE — PROCEDURES
4/1/2022 (Late entry note). PROCEDURE PERFORMED:     1. Transesophageal echocardiogram.      INDICATIONS: Severe MR    PROCEDURE: The patient was brought to the lab in fasting state. The patient received adequate sedation with the assistance of anesthesia for the case. After ensuring adequate sedation, the esophagus was intubated and a complete transesophageal echocardiogram was completed. There were no complications related to the study. 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 CT surgery consulted  2. C/C to follow    See formal XAVIER report for full details.     Lebron Goodpasture, MD, [address] [facility]   [phone] [facility]   [phone] [facility]

## 2022-04-02 NOTE — PROGRESS NOTES
Patient remains in afib, HR controlled in 70s while at rest. Metoprolol held this morning d/t BP 99/63. Denies any CP at this time.  Will continue to monitor    BP 99/63   Pulse 113   Temp 98.9 °F (37.2 °C) (Oral)   Resp 16   Ht 5' (1.524 m)   Wt 99 lb 10.4 oz (45.2 kg)   SpO2 92%   BMI 19.46 kg/m²

## 2022-04-02 NOTE — PROGRESS NOTES
Pt bedrest ended at 2100. Pt was able to get up without any problem or complaints. All site show no signs of redness, bleeding, or hematomas.

## 2022-04-02 NOTE — PROGRESS NOTES
Hospitalist Progress Note      PCP: Priscila Sheriff MD    Date of Admission: 3/30/2022    Chief Complaint: [facility] Course: Enoch Deluca is a 75 y.o. female who presented to the ED to be evaluated for a several year history of heart palpitations, which became associated with weakness and dyspnea 3 months PTA. The patient reports that she has not been seen by any type of physician for greater than 2 decades. She is scheduled a new PCP appointment for earlier today, but when she arrived she was tachycardic into the 150s therefore EMS was contacted to transport her to the ED. Upon further questioning, patient endorses intermittent chest pain for which she takes a baby aspirin PRN. She states that she has noted increased THOMPSON as well as BLE edema ongoing since the beginning of 2022.     Upon arrival to the ED EKG was obtained revealing A. fib RVR with ventricular rate 141; PVCs, anterior infarct of unknown duration, L APB, and QTC prolongation also appreciated. CXR notable for bilateral pleural effusions as well as bibasilar opacities. Admitting hospitalist physician has ordered chest CT with PE protocol to further assess. Labs were essentially unremarkable excluding BNP 3634. Patient received IV Cardizem bolus and drip initiation per ED provider. She will be admitted for new onset A. fib with suspected decompensated CHF. Subjective: Resting, NAD. Updated on plan of care.  VSS      Medications:  Reviewed    Infusion Medications    sodium chloride      dilTIAZem Stopped (03/31/22 1339)     Scheduled Medications    sodium chloride flush  5-40 mL IntraVENous 2 times per day    furosemide  40 mg IntraVENous BID    [Held by provider] enoxaparin  1 mg/kg SubCUTAneous BID    metoprolol succinate  25 mg Oral Daily    aspirin  162 mg Oral Once    aspirin  81 mg Oral Daily    [Held by provider] apixaban  5 mg Oral BID     PRN Meds: sodium chloride flush, sodium chloride, acetaminophen, butalbital-acetaminophen-caffeine, potassium chloride **OR** potassium alternative oral replacement **OR** potassium chloride, magnesium sulfate, senna, acetaminophen **OR** acetaminophen, perflutren lipid microspheres, ondansetron **OR** ondansetron, nitroGLYCERIN      Intake/Output Summary (Last 24 hours) at 4/2/2022 0903  Last data filed at 4/2/2022 0136  Gross per 24 hour   Intake 100 ml   Output 1900 ml   Net -1800 ml       Physical Exam Performed:    BP 94/60   Pulse 95   Temp 97.6 °F (36.4 °C) (Oral)   Resp 16   Ht 5' (1.524 m)   Wt 99 lb 10.4 oz (45.2 kg)   SpO2 95%   BMI 19.46 kg/m²     General appearance: No apparent distress, appears stated age and cooperative. HEENT: Pupils equal, round, and reactive to light. Conjunctivae/corneas clear. Neck: Supple, with full range of motion. No jugular venous distention. Trachea midline. Respiratory:  Normal respiratory effort. Clear to auscultation, bilaterally without Rales/Wheezes/Rhonchi. Cardiovascular: Irregular rate and rhythm with BAY  Abdomen: Soft, non-tender, non-distended with normal bowel sounds. Musculoskeletal: No clubbing, cyanosis or edema bilaterally. Full range of motion without deformity. Skin: Skin color, texture, turgor normal.  No rashes or lesions. Neurologic:  Neurovascularly intact without any focal sensory/motor deficits.  Cranial nerves: II-XII intact, grossly non-focal.  Psychiatric: Alert and oriented, thought content appropriate, normal insight  Capillary Refill: Brisk,3 seconds, normal   Peripheral Pulses: +2 palpable, equal bilaterally       Labs:   Recent Labs     03/30/22  1605 04/02/22  0532   WBC 5.7 6.5   HGB 14.2 14.4   HCT 42.2 42.9    195     Recent Labs     03/31/22  0644 04/01/22  0632 04/02/22  0532    136 137   K 3.9 3.7 4.1    97* 95*   CO2 27 27 30   BUN 18 18 29*   CREATININE 0.8 0.7 0.9   CALCIUM 9.4 9.3 9.1     Recent Labs     03/30/22  1605 03/31/22  0644   AST 31 29   ALT 19 18 BILITOT 0.6 0.8   ALKPHOS 89 83     Recent Labs     03/30/22 2051   INR 1.29*     Recent Labs     03/30/22  1605 03/30/22 2051 03/31/22  0037   TROPONINI <0.01 <0.01 <0.01       Urinalysis:      Lab Results   Component Value Date    NITRU Negative 03/30/2022    BLOODU Negative 03/30/2022    SPECGRAV 1.010 03/30/2022    GLUCOSEU Negative 03/30/2022       Radiology:  CT CHEST PULMONARY EMBOLISM W CONTRAST   Final Result   No evidence of pulmonary embolism      Cardiomegaly, including severe left atrial enlargement. Small bilateral pleural effusions, right greater than left, with associated   atelectasis. Mild smooth interlobular septal thickening, favored to represent mild   interstitial pulmonary edema.          XR CHEST PORTABLE   Final Result   Bilateral pleural effusions right greater than left with bibasilar opacities   that could represent atelectasis or pneumonia                 Assessment/Plan:    Active Hospital Problems    Diagnosis     Atrial fibrillation with RVR ([address] [I48.91]     New onset atrial fibrillation (HCC) [I48.91]     Decompensated heart failure ([address] [I50.9]     Pleural effusion, bilateral [J90]     LAFB (left anterior fascicular block) [I44.4]     Cardiomegaly [F09.2]     Pansystolic murmur [B25.6]      New onset A. fib with RVR  -Tele  -Patient initiated on IV Cardizem bolus and drip, the latter of which will be continued overnight  -Electrolytes, thyroid studies to assess for secondary causes of this tachyarrhythmia  -Patient with CHADS2 score of 3, Eliquis initiated-however d/c'd this morning after Echo findings-start lovenox tx dose  -Echo: EF 55%, severe MR, mild AR, mild to mod TR  -Cardiology consulted  -LHC: nl coronaries, RHC High-nl left vent filling pressure, elevated wedge pressure, mil elevated pulm pressure  -CT surg consult MV repair     Chest pain   -ASA daily, BB  -FLP , HDL 70, Cholesterol 185; patient declined statin this evening  -Nitropaste applied to chest wall (BP permitting)  -Nuclear stress test not ordered at this time due to A. fib RVR presentation  -Serial troponin negative     Pulmonary edema  -Suspect decompensated CHF, although patient with no previous cardiac history or echo available for review  -Chest CT with PE protocol ordered revealing massive cardiomegaly with severe LAE enlargement  -Admit to floor for continuous telemetry monitoring and strict I's & O's  -IV Lasix 40 mg initiated Q12 hours  -2G Na and fluid restriction dietary modifications in place    DVT Prophylaxis: Lovenox-held, scds  Diet: ADULT DIET; Regular; Low Fat/Low Chol/High Fiber/2 gm Na  Code Status: Full Code    PT/OT Eval Status: ambulatory    Dispo - pending course, several days, CT surg eval/plan.     CHARLES Mcbride - CNP

## 2022-04-02 NOTE — PROGRESS NOTES
[address] 80   Daily Progress Note    Admit Date:  3/30/2022  HPI:    Chief Complaint   Patient presents with    Atrial Fibrillation     hx of afib, was at PCP today and told that her heart was going to quickly      [employer] potation's and shortness of breath. She was found to have atrial fibrillation with RVR on admission. An echocardiogram showed severe MR with flail posterior mitral valve leaflet. He underwent R/LHC on 4/1/2022 showing normal coronary arteries, mild elevation of filling pressures. CT surgery has been consulted for minimally invasive mitral valve correction. Subjective:  Ms. Andrews Foil sitting up in bed, continues with some shortness of breath. She denies any palpitations or edema. She wants to go home prior to any valve surgery. She would like to check her insurance plan and obtain a second opinion before surgery. Objective:   Patient Vitals for the past 24 hrs:   BP Temp Temp src Pulse Resp SpO2 Height Weight   04/02/22 0600 -- -- -- -- -- -- -- 99 lb 10.4 oz (45.2 kg)   04/02/22 0545 94/60 97.6 °F (36.4 °C) Oral 95 16 95 % -- --   04/01/22 2300 115/79 98.7 °F (37.1 °C) Oral 117 18 93 % -- --   04/01/22 2116 121/84 -- -- -- -- -- -- --   04/01/22 2008 122/74 98.5 °F (36.9 °C) Oral 107 16 92 % -- --   04/01/22 1729 108/78 97.7 °F (36.5 °C) Oral 102 18 93 % -- --   04/01/22 1500 -- -- -- -- -- -- 5' (1.524 m) --   04/01/22 1045 116/80 -- -- 125 16 94 % -- --       Intake/Output Summary (Last 24 hours) at 4/2/2022 0812  Last data filed at 4/2/2022 0136  Gross per 24 hour   Intake 100 ml   Output 1900 ml   Net -1800 ml     Wt Readings from Last 3 Encounters:   04/02/22 99 lb 10.4 oz (45.2 kg)   08/17/10 105 lb (47.6 kg)         ASSESSMENT:   1. A. fib with RVR: Rate with only fair control  2. CHF, acute diastolic secondary to valvular disease: Weight the same, net -5 L, still with JVD but no rales or edema  3.  Severe mitral regurgitation: With flail posterior mitral valve leaflet  4. Chest pain: non-ischemic, likely CHF/ MR      PLAN:  1. Increase Toprol XL to 25 mg bid  2. Continue IV Lasix 40 mg bid today  3. CVTS consult for severe MR  4. Daily weights, daily labs, strict I/O  5.  Resume lovenox but hold on DOAC pending plans for surgery    Kaylah Salcedo, APRN - CNP, 4/2/2022, 8:12 AM  [facility]   [phone]       Telemetry: A. fib , PVCs  NYHA: III    Physical Exam:  General:  Awake, alert, NAD  Skin:  Warm and dry  Neck:  JVP 10+, + HJR  Chest: Clear to auscultation posteriorly, moist cough with deep breaths  Cardiovascular: Irregularly irregular, normal F9V5, 4/6 diastolic murmur loudest at apex, no g/r  Abdomen:  Soft, nontender, +bowel sounds  Extremities:  No BLE edema  right radial site without ooze, bruise or hematoma, dressing C,D,I, 2+ pulse      Medications:    sodium chloride flush  5-40 mL IntraVENous 2 times per day    furosemide  40 mg IntraVENous BID    [Held by provider] enoxaparin  1 mg/kg SubCUTAneous BID    metoprolol succinate  25 mg Oral Daily    aspirin  162 mg Oral Once    aspirin  81 mg Oral Daily    [Held by provider] apixaban  5 mg Oral BID      sodium chloride      dilTIAZem Stopped (03/31/22 1339)       Lab Data: Lab results independently reviewed and analyzed by myself 4/2/2022    CBC:   Recent Labs     03/30/22  1605 04/02/22  0532   WBC 5.7 6.5   HGB 14.2 14.4    195     BMP:    Recent Labs     03/31/22  0644 04/01/22  0632 04/02/22  0532    136 137   K 3.9 3.7 4.1   CO2 27 27 30   BUN 18 18 29*   CREATININE 0.8 0.7 0.9     INR:    Recent Labs     03/30/22 2051   INR 1.29*     BNP:    Recent Labs     03/30/22  1605   PROBNP 3,634*     Cardiac Enzymes:   Recent Labs     03/30/22  1605 03/30/22 2051 03/31/22  0037   TROPONINI <0.01 <0.01 <0.01     Lipids:   Lab Results   Component Value Date    TRIG 64 03/31/2022    HDL 70 03/31/2022    LDLCALC 102 03/31/2022       Cardiac Imaging:    CARDIAC CATHETERIZATION REPORT   Date of Procedure: 4/1/22  : Elli Luis MD   Primary Indication: Severe mitral regurgitation    Procedures Performed:  1. Coronary angiography  2. Left heart catheterization  3. Right heart catheterization   Procedural Details:  1. Access: Local anesthetic was given and access was obtained in the right radial artery using a micropuncture technique and a 5F Terumo Slender Sheath was placed without difficulty. A 7F Terumo Slender Sheath was placed in the right brachial vein via wire exchange of an existing IV that was placed under sterile conditions. 2. Diagnostic: A 7F TD Vevelyn Lash catheter was used to perform the right heart catheterization. A 5F JR4 catheter and 5F JL3.5 catheter were used to perform selective right and left coronary angiography, respectively. A 5F Pigtail catheter was used to perform the left heart catheterization. No significant gradient was observed on pull-back of the catheter across the aortic valve. 3. Hemostasis: At the end of the procedure, the radial sheath was removed and a hemoband was placed over the arteriotomy site and filled with 15 cc air to maintain hemostasis. The venous sheath was moved and manual pressure applied to maintain hemostasis. Findings:  1. Hemodynamics:  A. Right heart catheterization                   1. RA:  6 mmHg                   2. RV:  34/5/7 mmHG                   3. PA:  37/18/27 mmHG                   4. PCWP: 22 mmHg                   5. Ignacio CO: 3.9 L/min                   6. Ignacio CI: 2.75  L/min*m2                   7. Ignacio SVR: 1571 d/s                   8. Transpulmonary gradient: 5       9. Ignacio PVR: 1 wood unit              B. Opening arterial pressure: 103/67/82              C. LVEDP: 15 mmhg  PA sat 71%  PW sat 95%  RA sat 70%  Ao sat 97% (on NRB at time of draw). 2.  Coronary anatomy:  A.  Left main artery: The left main artery bifurcates into the left anterior descending artery and left circumflex artery. The left main artery was normal.   B. Left anterior descending artery: Transapical vessel which gives rise to 2 diagonal arteries. The left anterior descending artery had no disease. Diminutive vessel distally. The diagonal arteries had no disease. C. Left circumflex artery: Non-dominant vessel that gives rise to 2 obtuse marginal arteries. The left circumflex artery had no disease. The first obtuse marginal artery had no diease. The second marginal artery was tortuous with no disease. D. Right coronary artery: Large Dominant vessel that gives rise to the posterior descending artery and posterolateral branch. The right coronary artery had no disease. The posterior descending artery had no disease. The posterolateral branch had no disease. Technical Factors:  Complications:  None  Estimated blood loss: <15cc  Sedation: Moderate conscious sedation was administered by qaulified nursing personnel under continuous hemodynamic monitoring, starting at 15:27 and ending at 16:46. Medications: 2 mg Versed, 25 mcg Fentanyl  Contrast: 100 cc of Isovue    Impression:  1. Normal coronaries. 2.  High-Normal left ventricular filling pressure. 3.  Normal cardiac index  4. Elevated wedge pressure - difficulty wedging (95% sat however, at time of recording). 5.  Mildly elevated pulmonary pressure/right sided filling pressure   Plan:  1. One more day of IV diuresis and consider transition to oral diuretic. 2.  Continue metoprolol, aspirin   3. CT surgery to evaluate for minimally invasive mitral valve repair. My preference would be that the patient has inpatient surgery early next week as she presented with decompensated congestive heart failure and atrial fibrillation as a result of her severe MR. This will prove difficult to manage on OP basis. She may however, seek second opinion and will need close follow up if choose to do so with follow up with our service in 1 week on discharge. Echo 3/31/2022  Summary   Irregular heart rate throughout study. Normal left ventricle systolic function with an estimated ejection fraction of 55%. No regional wall motion abnormalities are seen. Diastolic dysfunction grade and filling pressure are indeterminate due to arrhythmia. The right ventricle is normal in size and function. Gigantic left atrium. The right atrium is mildly dilated. Severely thickened mitral valve leaflets. The posterior mitral valve leaflet appears flail with likely ruptured chordae, resulting in severe eccentric, anteriorly-directed mitral regurgitation. Degree of left atrial enlargement suggests chronic MR, Cannot however, rule out super-imposed vegetation/endocarditis. Clinical correlation advised. Mild aortic valve regurgitation. Mild to moderate tricuspid valve regurgitation. Systolic pulmonary artery pressure (sPAP) is normal and estimated at 30 mmHg (right atrial pressure 8 mmHg)   There is a small right pleural effusion. No prior studies available for comparison.

## 2022-04-03 LAB
ANION GAP SERPL CALCULATED.3IONS-SCNC: 14 MMOL/L (ref 3–16)
BUN BLDV-MCNC: 30 MG/DL (ref 7–20)
CALCIUM SERPL-MCNC: 9.3 MG/DL (ref 8.3–10.6)
CHLORIDE BLD-SCNC: 93 MMOL/L (ref 99–110)
CO2: 27 MMOL/L (ref 21–32)
CREAT SERPL-MCNC: 0.7 MG/DL (ref 0.6–1.2)
GFR AFRICAN AMERICAN: >60
GFR NON-AFRICAN AMERICAN: >60
GLUCOSE BLD-MCNC: 99 MG/DL (ref 70–99)
HCT VFR BLD CALC: 43.9 % (ref 36–48)
HEMOGLOBIN: 14.4 G/DL (ref 12–16)
MAGNESIUM: 2.1 MG/DL (ref 1.8–2.4)
MCH RBC QN AUTO: 31.4 PG (ref 26–34)
MCHC RBC AUTO-ENTMCNC: 32.8 G/DL (ref 31–36)
MCV RBC AUTO: 95.9 FL (ref 80–100)
PDW BLD-RTO: 14.1 % (ref 12.4–15.4)
PLATELET # BLD: 198 K/UL (ref 135–450)
PMV BLD AUTO: 7.3 FL (ref 5–10.5)
POTASSIUM SERPL-SCNC: 3.6 MMOL/L (ref 3.5–5.1)
PRO-BNP: 1211 PG/ML (ref 0–124)
RBC # BLD: 4.57 M/UL (ref 4–5.2)
SODIUM BLD-SCNC: 134 MMOL/L (ref 136–145)
WBC # BLD: 6.6 K/UL (ref 4–11)

## 2022-04-03 PROCEDURE — 2580000003 HC RX 258: Performed by: INTERNAL MEDICINE

## 2022-04-03 PROCEDURE — 6360000002 HC RX W HCPCS: Performed by: INTERNAL MEDICINE

## 2022-04-03 PROCEDURE — 83880 ASSAY OF NATRIURETIC PEPTIDE: CPT

## 2022-04-03 PROCEDURE — 83735 ASSAY OF MAGNESIUM: CPT

## 2022-04-03 PROCEDURE — 80048 BASIC METABOLIC PNL TOTAL CA: CPT

## 2022-04-03 PROCEDURE — 2060000000 HC ICU INTERMEDIATE R&B

## 2022-04-03 PROCEDURE — 85027 COMPLETE CBC AUTOMATED: CPT

## 2022-04-03 PROCEDURE — 6370000000 HC RX 637 (ALT 250 FOR IP): Performed by: HOSPITALIST

## 2022-04-03 PROCEDURE — 99233 SBSQ HOSP IP/OBS HIGH 50: CPT | Performed by: NURSE PRACTITIONER

## 2022-04-03 PROCEDURE — 36415 COLL VENOUS BLD VENIPUNCTURE: CPT

## 2022-04-03 PROCEDURE — 6370000000 HC RX 637 (ALT 250 FOR IP): Performed by: NURSE PRACTITIONER

## 2022-04-03 RX ORDER — FUROSEMIDE 40 MG/1
40 TABLET ORAL DAILY
Status: DISCONTINUED | OUTPATIENT
Start: 2022-04-04 | End: 2022-04-05

## 2022-04-03 RX ORDER — METOPROLOL SUCCINATE 25 MG/1
25 TABLET, EXTENDED RELEASE ORAL ONCE
Status: COMPLETED | OUTPATIENT
Start: 2022-04-03 | End: 2022-04-03

## 2022-04-03 RX ORDER — DIGOXIN 125 MCG
125 TABLET ORAL DAILY
Status: DISCONTINUED | OUTPATIENT
Start: 2022-04-03 | End: 2022-04-06

## 2022-04-03 RX ORDER — METOPROLOL SUCCINATE 50 MG/1
50 TABLET, EXTENDED RELEASE ORAL 2 TIMES DAILY
Status: DISCONTINUED | OUTPATIENT
Start: 2022-04-03 | End: 2022-04-06

## 2022-04-03 RX ADMIN — METOPROLOL SUCCINATE 25 MG: 25 TABLET, EXTENDED RELEASE ORAL at 10:36

## 2022-04-03 RX ADMIN — ENOXAPARIN SODIUM 50 MG: 60 INJECTION SUBCUTANEOUS at 08:23

## 2022-04-03 RX ADMIN — SODIUM CHLORIDE, PRESERVATIVE FREE 10 ML: 5 INJECTION INTRAVENOUS at 09:00

## 2022-04-03 RX ADMIN — METOPROLOL SUCCINATE 25 MG: 25 TABLET, EXTENDED RELEASE ORAL at 08:20

## 2022-04-03 RX ADMIN — ENOXAPARIN SODIUM 50 MG: 60 INJECTION SUBCUTANEOUS at 20:13

## 2022-04-03 RX ADMIN — ASPIRIN 81 MG: 81 TABLET, COATED ORAL at 08:20

## 2022-04-03 RX ADMIN — DIGOXIN 125 MCG: 125 TABLET ORAL at 10:29

## 2022-04-03 RX ADMIN — SODIUM CHLORIDE, PRESERVATIVE FREE 10 ML: 5 INJECTION INTRAVENOUS at 20:14

## 2022-04-03 RX ADMIN — METOPROLOL SUCCINATE 50 MG: 50 TABLET, EXTENDED RELEASE ORAL at 20:14

## 2022-04-03 RX ADMIN — FUROSEMIDE 40 MG: 10 INJECTION, SOLUTION INTRAMUSCULAR; INTRAVENOUS at 08:21

## 2022-04-03 ASSESSMENT — PAIN SCALES - GENERAL
PAINLEVEL_OUTOF10: 0

## 2022-04-03 NOTE — PROGRESS NOTES
CVTS    MR -  Called regarding consult. seen 4/1 by Dr. Jamarcus Perez. Follow up to occur tomorrow. Pt contacting insurance co. and considering 2nd opinion.     Aditya Weathers MD  4/3/2022  11:02 AM

## 2022-04-03 NOTE — PROGRESS NOTES
Hospitalist Progress Note      PCP: John Crystal MD    Date of Admission: 3/30/2022    Chief Complaint: [facility] Course: Felipa Holden is a 75 y.o. female who presented to the ED to be evaluated for a several year history of heart palpitations, which became associated with weakness and dyspnea 3 months PTA. The patient reports that she has not been seen by any type of physician for greater than 2 decades. She is scheduled a new PCP appointment for earlier today, but when she arrived she was tachycardic into the 150s therefore EMS was contacted to transport her to the ED. Upon further questioning, patient endorses intermittent chest pain for which she takes a baby aspirin PRN. She states that she has noted increased THOMPSON as well as BLE edema ongoing since the beginning of 2022.     Upon arrival to the ED EKG was obtained revealing A. fib RVR with ventricular rate 141; PVCs, anterior infarct of unknown duration, L APB, and QTC prolongation also appreciated. CXR notable for bilateral pleural effusions as well as bibasilar opacities. Admitting hospitalist physician has ordered chest CT with PE protocol to further assess. Labs were essentially unremarkable excluding BNP 3634. Patient received IV Cardizem bolus and drip initiation per ED provider. She will be admitted for new onset A. fib with suspected decompensated CHF. Subjective: Resting, NAD. Updated on plan of care.  VSS, CT surg consulted      Medications:  Reviewed    Infusion Medications    sodium chloride      dilTIAZem Stopped (03/31/22 1339)     Scheduled Medications    metoprolol succinate  25 mg Oral BID    sodium chloride flush  5-40 mL IntraVENous 2 times per day    furosemide  40 mg IntraVENous BID    enoxaparin  1 mg/kg SubCUTAneous BID    aspirin  162 mg Oral Once    aspirin  81 mg Oral Daily    [Held by provider] apixaban  5 mg Oral BID     PRN Meds: sodium chloride flush, sodium chloride, acetaminophen, butalbital-acetaminophen-caffeine, potassium chloride **OR** potassium alternative oral replacement **OR** potassium chloride, magnesium sulfate, senna, acetaminophen **OR** acetaminophen, perflutren lipid microspheres, ondansetron **OR** ondansetron, nitroGLYCERIN      Intake/Output Summary (Last 24 hours) at 4/3/2022 0916  Last data filed at 4/3/2022 0603  Gross per 24 hour   Intake 480 ml   Output 2350 ml   Net -1870 ml       Physical Exam Performed:    /66   Pulse 91   Temp 97.8 °F (36.6 °C) (Oral)   Resp 18   Ht 5' (1.524 m)   Wt 100 lb 8.5 oz (45.6 kg)   SpO2 92%   BMI 19.63 kg/m²     General appearance: No apparent distress, appears stated age and cooperative. HEENT: Pupils equal, round, and reactive to light. Conjunctivae/corneas clear. Neck: Supple, with full range of motion. No jugular venous distention. Trachea midline. Respiratory:  Normal respiratory effort. Clear to auscultation, bilaterally without Rales/Wheezes/Rhonchi. Cardiovascular: Irregular rate and rhythm with BAY  Abdomen: Soft, non-tender, non-distended with normal bowel sounds. Musculoskeletal: No clubbing, cyanosis or edema bilaterally. Full range of motion without deformity. Skin: Skin color, texture, turgor normal.  No rashes or lesions. Neurologic:  Neurovascularly intact without any focal sensory/motor deficits.  Cranial nerves: II-XII intact, grossly non-focal.  Psychiatric: Alert and oriented, thought content appropriate, normal insight  Capillary Refill: Brisk,3 seconds, normal   Peripheral Pulses: +2 palpable, equal bilaterally       Labs:   Recent Labs     04/02/22  0532 04/03/22  0532   WBC 6.5 6.6   HGB 14.4 14.4   HCT 42.9 43.9    198     Recent Labs     04/01/22  0632 04/02/22  0532 04/03/22  0532    137 134*   K 3.7 4.1 3.6   CL 97* 95* 93*   CO2 27 30 27   BUN 18 29* 30*   CREATININE 0.7 0.9 0.7   CALCIUM 9.3 9.1 9.3     No results for input(s): AST, ALT, BILIDIR, BILITOT, ALKPHOS in the last 72 hours. No results for input(s): INR in the last 72 hours. No results for input(s): Philippe Simms in the last 72 hours. Urinalysis:      Lab Results   Component Value Date    NITRU Negative 03/30/2022    BLOODU Negative 03/30/2022    SPECGRAV 1.010 03/30/2022    GLUCOSEU Negative 03/30/2022       Radiology:  CT CHEST PULMONARY EMBOLISM W CONTRAST   Final Result   No evidence of pulmonary embolism      Cardiomegaly, including severe left atrial enlargement. Small bilateral pleural effusions, right greater than left, with associated   atelectasis. Mild smooth interlobular septal thickening, favored to represent mild   interstitial pulmonary edema.          XR CHEST PORTABLE   Final Result   Bilateral pleural effusions right greater than left with bibasilar opacities   that could represent atelectasis or pneumonia                 Assessment/Plan:    Active Hospital Problems    Diagnosis     Mitral valve insufficiency [I34.0]     Atrial fibrillation with RVR (HCC) [I48.91]     New onset atrial fibrillation (HCC) [I48.91]     Decompensated heart failure (HCC) [I50.9]     Pleural effusion, bilateral [J90]     LAFB (left anterior fascicular block) [I44.4]     Cardiomegaly [Q00.8]     Pansystolic murmur [M51.3]      New onset A. fib with RVR  -Tele  -Patient initiated on IV Cardizem bolus and drip, the latter of which will be continued overnight  -Electrolytes, thyroid studies to assess for secondary causes of this tachyarrhythmia  -Patient with CHADS2 score of 3, Eliquis initiated-however d/c'd this morning after Echo findings-start lovenox tx dose  -Echo: EF 55%, severe MR, mild AR, mild to mod TR  -Cardiology consulted  -LHC: nl coronaries, RHC High-nl left vent filling pressure, elevated wedge pressure, mil elevated pulm pressure  -CT surg consult MV repair     Chest pain   -ASA daily, BB  -FLP , HDL 70, Cholesterol 185; patient declined statin this evening  -Nitropaste applied to chest wall (BP permitting)  -Nuclear stress test not ordered at this time due to A. fib RVR presentation  -Serial troponin negative     Pulmonary edema  -Suspect decompensated CHF, although patient with no previous cardiac history or echo available for review  -Chest CT with PE protocol ordered revealing massive cardiomegaly with severe LAE enlargement  -Admit to floor for continuous telemetry monitoring and strict I's & O's  -Lasix  -2G Na and fluid restriction dietary modifications in place    DVT Prophylaxis: Lovenox, scds  Diet: ADULT DIET; Regular; Low Fat/Low Chol/High Fiber/2 gm Na  Code Status: Full Code    PT/OT Eval Status: ambulatory    Dispo - pending course, several days, CT surg eval/plan.     CHARLES Ku - CNP

## 2022-04-03 NOTE — PROGRESS NOTES
[facility]   Daily Progress Note    Admit Date:  3/30/2022  HPI:    Chief Complaint   Patient presents with    Atrial Fibrillation     hx of afib, was at PCP today and told that her heart was going to quickly      [employer] potation's and shortness of breath. She was found to have atrial fibrillation with RVR on admission. An echocardiogram showed severe MR with flail posterior mitral valve leaflet. He underwent R/LHC on 4/1/2022 showing normal coronary arteries, mild elevation of filling pressures. CT surgery has been consulted for minimally invasive mitral valve correction. Subjective:  Ms. Rebecca Patel seen up in room. Had an episode of shortness of breath at rest yesterday afternoon. Denies chest pain or palpitations. Objective:   Patient Vitals for the past 24 hrs:   BP Temp Temp src Pulse Resp SpO2 Weight   04/03/22 0809 104/66 97.8 °F (36.6 °C) Oral 91 18 92 % --   04/03/22 0600 -- -- -- -- -- -- 100 lb 8.5 oz (45.6 kg)   04/03/22 0510 (!) 93/58 98.3 °F (36.8 °C) Oral 91 19 95 % --   04/03/22 0023 104/62 98.2 °F (36.8 °C) Oral 89 16 96 % --   04/02/22 2202 110/74 -- -- -- -- -- --   04/02/22 2201 110/74 -- -- 140 -- -- --   04/02/22 2125 99/87 -- -- -- -- -- --   04/02/22 2054 107/78 98.3 °F (36.8 °C) Oral 88 19 93 % --   04/02/22 1511 106/68 98.6 °F (37 °C) Oral 90 18 95 % --   04/02/22 1125 109/72 98.2 °F (36.8 °C) Oral 104 18 92 % --       Intake/Output Summary (Last 24 hours) at 4/3/2022 1010  Last data filed at 4/3/2022 0603  Gross per 24 hour   Intake 480 ml   Output 2350 ml   Net -1870 ml     Wt Readings from Last 3 Encounters:   04/03/22 100 lb 8.5 oz (45.6 kg)   08/17/10 105 lb (47.6 kg)         ASSESSMENT:   1. A. fib with RVR: Rate not controlled  2. CHF, acute diastolic secondary to valvular disease: Weight up 1#, net -6.5 L, no rales or edema, JVP improved, still with shortness of breath   3. Severe mitral regurgitation: With flail posterior mitral valve leaflet  4.  Chest pain: non-ischemic, likely CHF/ MR      PLAN:  1. Increase Toprol XL to 50 mg bid  2. Stop IV Lasix   3. CVTS consult for severe MR - to see tomorrow AM  4. Daily weights, daily labs, strict I/O  5. Resume lovenox but hold on DOAC pending plans for surgery    Aisha Draper, CHARLES - CNP, 4/3/2022, 10:10 AM  [address]   [phone]       Telemetry: A. fib , PVCs  NYHA: III    Physical Exam:  General:  Awake, alert, NAD  Skin:  Warm and dry  Neck:  JVP 8 cm, - HJR  Chest: Clear to auscultation posteriorly  Cardiovascular: Irregularly irregular, normal S8H3, 4/6 diastolic murmur loudest at apex, no g/r  Abdomen:  Soft, nontender, +bowel sounds  Extremities:  No BLE edema  right radial site without ooze, bruise or hematoma, dressing C,D,I (removed), 2+ pulse      Medications:    metoprolol succinate  25 mg Oral BID    sodium chloride flush  5-40 mL IntraVENous 2 times per day    furosemide  40 mg IntraVENous BID    enoxaparin  1 mg/kg SubCUTAneous BID    aspirin  162 mg Oral Once    aspirin  81 mg Oral Daily    [Held by provider] apixaban  5 mg Oral BID      sodium chloride      dilTIAZem Stopped (03/31/22 1339)       Lab Data: Lab results independently reviewed and analyzed by myself 4/3/2022    CBC:   Recent Labs     04/02/22  0532 04/03/22  0532   WBC 6.5 6.6   HGB 14.4 14.4    198     BMP:    Recent Labs     04/01/22  0632 04/02/22  0532 04/03/22  0532    137 134*   K 3.7 4.1 3.6   CO2 27 30 27   BUN 18 29* 30*   CREATININE 0.7 0.9 0.7     INR:    No results for input(s): INR in the last 72 hours. BNP:    Recent Labs     04/03/22  0532   PROBNP 1,211*     Cardiac Enzymes:   No results for input(s): TROPONINI in the last 72 hours.   Lipids:   Lab Results   Component Value Date    TRIG 64 03/31/2022    HDL 70 03/31/2022    LDLCALC 102 03/31/2022       Cardiac Imaging:    CARDIAC CATHETERIZATION REPORT   Date of Procedure: 4/1/22  : Akanksha Fernandes MD   Primary Indication: Severe mitral regurgitation    Procedures Performed:  1. Coronary angiography  2. Left heart catheterization  3. Right heart catheterization   Procedural Details:  1. Access: Local anesthetic was given and access was obtained in the right radial artery using a micropuncture technique and a 5F Terumo Slender Sheath was placed without difficulty. A 7F Terumo Slender Sheath was placed in the right brachial vein via wire exchange of an existing IV that was placed under sterile conditions. 2. Diagnostic: A 7F TD Shaun Wilson catheter was used to perform the right heart catheterization. A 5F JR4 catheter and 5F JL3.5 catheter were used to perform selective right and left coronary angiography, respectively. A 5F Pigtail catheter was used to perform the left heart catheterization. No significant gradient was observed on pull-back of the catheter across the aortic valve. 3. Hemostasis: At the end of the procedure, the radial sheath was removed and a hemoband was placed over the arteriotomy site and filled with 15 cc air to maintain hemostasis. The venous sheath was moved and manual pressure applied to maintain hemostasis. Findings:  1. Hemodynamics:  A. Right heart catheterization                   1. RA:  6 mmHg                   2. RV:  34/5/7 mmHG                   3. PA:  37/18/27 mmHG                   4. PCWP: 22 mmHg                   5. Ignacio CO: 3.9 L/min                   6. Ignacio CI: 2.75  L/min*m2                   7. Ignacio SVR: 1571 d/s                   8. Transpulmonary gradient: 5       9. Ignacio PVR: 1 wood unit              B. Opening arterial pressure: 103/67/82              C. LVEDP: 15 mmhg  PA sat 71%  PW sat 95%  RA sat 70%  Ao sat 97% (on NRB at time of draw). 2.  Coronary anatomy:  A. Left main artery: The left main artery bifurcates into the left anterior descending artery and left circumflex artery.   The left main artery was normal.   B. Left anterior descending artery: Transapical vessel which gives rise to 2 diagonal arteries. The left anterior descending artery had no disease. Diminutive vessel distally. The diagonal arteries had no disease. C. Left circumflex artery: Non-dominant vessel that gives rise to 2 obtuse marginal arteries. The left circumflex artery had no disease. The first obtuse marginal artery had no diease. The second marginal artery was tortuous with no disease. D. Right coronary artery: Large Dominant vessel that gives rise to the posterior descending artery and posterolateral branch. The right coronary artery had no disease. The posterior descending artery had no disease. The posterolateral branch had no disease. Technical Factors:  Complications:  None  Estimated blood loss: <15cc  Sedation: Moderate conscious sedation was administered by qaulified nursing personnel under continuous hemodynamic monitoring, starting at 15:27 and ending at 16:46. Medications: 2 mg Versed, 25 mcg Fentanyl  Contrast: 100 cc of Isovue    Impression:  1. Normal coronaries. 2.  High-Normal left ventricular filling pressure. 3.  Normal cardiac index  4. Elevated wedge pressure - difficulty wedging (95% sat however, at time of recording). 5.  Mildly elevated pulmonary pressure/right sided filling pressure   Plan:  1. One more day of IV diuresis and consider transition to oral diuretic. 2.  Continue metoprolol, aspirin   3. CT surgery to evaluate for minimally invasive mitral valve repair. My preference would be that the patient has inpatient surgery early next week as she presented with decompensated congestive heart failure and atrial fibrillation as a result of her severe MR. This will prove difficult to manage on OP basis. She may however, seek second opinion and will need close follow up if choose to do so with follow up with our service in 1 week on discharge. Echo 3/31/2022  Summary   Irregular heart rate throughout study.    Normal left ventricle systolic function with an estimated ejection fraction of 55%. No regional wall motion abnormalities are seen. Diastolic dysfunction grade and filling pressure are indeterminate due to arrhythmia. The right ventricle is normal in size and function. Gigantic left atrium. The right atrium is mildly dilated. Severely thickened mitral valve leaflets. The posterior mitral valve leaflet appears flail with likely ruptured chordae, resulting in severe eccentric, anteriorly-directed mitral regurgitation. Degree of left atrial enlargement suggests chronic MR, Cannot however, rule out super-imposed vegetation/endocarditis. Clinical correlation advised. Mild aortic valve regurgitation. Mild to moderate tricuspid valve regurgitation. Systolic pulmonary artery pressure (sPAP) is normal and estimated at 30 mmHg (right atrial pressure 8 mmHg)   There is a small right pleural effusion. No prior studies available for comparison.

## 2022-04-04 ENCOUNTER — APPOINTMENT (OUTPATIENT)
Dept: VASCULAR LAB | Age: 67
DRG: 216 | End: 2022-04-04
Payer: MEDICARE

## 2022-04-04 LAB
ANION GAP SERPL CALCULATED.3IONS-SCNC: 11 MMOL/L (ref 3–16)
BUN BLDV-MCNC: 27 MG/DL (ref 7–20)
CALCIUM SERPL-MCNC: 9 MG/DL (ref 8.3–10.6)
CHLORIDE BLD-SCNC: 92 MMOL/L (ref 99–110)
CO2: 30 MMOL/L (ref 21–32)
CREAT SERPL-MCNC: 0.8 MG/DL (ref 0.6–1.2)
GFR AFRICAN AMERICAN: >60
GFR NON-AFRICAN AMERICAN: >60
GLUCOSE BLD-MCNC: 118 MG/DL (ref 70–99)
MAGNESIUM: 1.9 MG/DL (ref 1.8–2.4)
POTASSIUM SERPL-SCNC: 3.9 MMOL/L (ref 3.5–5.1)
SODIUM BLD-SCNC: 133 MMOL/L (ref 136–145)

## 2022-04-04 PROCEDURE — 93880 EXTRACRANIAL BILAT STUDY: CPT

## 2022-04-04 PROCEDURE — 99223 1ST HOSP IP/OBS HIGH 75: CPT | Performed by: NURSE PRACTITIONER

## 2022-04-04 PROCEDURE — 80048 BASIC METABOLIC PNL TOTAL CA: CPT

## 2022-04-04 PROCEDURE — 36415 COLL VENOUS BLD VENIPUNCTURE: CPT

## 2022-04-04 PROCEDURE — 2060000000 HC ICU INTERMEDIATE R&B

## 2022-04-04 PROCEDURE — 99232 SBSQ HOSP IP/OBS MODERATE 35: CPT | Performed by: NURSE PRACTITIONER

## 2022-04-04 PROCEDURE — 94010 BREATHING CAPACITY TEST: CPT

## 2022-04-04 PROCEDURE — 6370000000 HC RX 637 (ALT 250 FOR IP): Performed by: HOSPITALIST

## 2022-04-04 PROCEDURE — 6370000000 HC RX 637 (ALT 250 FOR IP): Performed by: NURSE PRACTITIONER

## 2022-04-04 PROCEDURE — 6360000002 HC RX W HCPCS: Performed by: INTERNAL MEDICINE

## 2022-04-04 PROCEDURE — 83735 ASSAY OF MAGNESIUM: CPT

## 2022-04-04 PROCEDURE — 2580000003 HC RX 258: Performed by: INTERNAL MEDICINE

## 2022-04-04 RX ADMIN — ENOXAPARIN SODIUM 50 MG: 60 INJECTION SUBCUTANEOUS at 21:21

## 2022-04-04 RX ADMIN — DIGOXIN 125 MCG: 125 TABLET ORAL at 10:24

## 2022-04-04 RX ADMIN — SODIUM CHLORIDE, PRESERVATIVE FREE 10 ML: 5 INJECTION INTRAVENOUS at 10:51

## 2022-04-04 RX ADMIN — SODIUM CHLORIDE, PRESERVATIVE FREE 10 ML: 5 INJECTION INTRAVENOUS at 21:22

## 2022-04-04 RX ADMIN — METOPROLOL SUCCINATE 50 MG: 50 TABLET, EXTENDED RELEASE ORAL at 11:26

## 2022-04-04 RX ADMIN — ASPIRIN 81 MG: 81 TABLET, COATED ORAL at 10:24

## 2022-04-04 RX ADMIN — ENOXAPARIN SODIUM 50 MG: 60 INJECTION SUBCUTANEOUS at 10:25

## 2022-04-04 RX ADMIN — METOPROLOL SUCCINATE 50 MG: 50 TABLET, EXTENDED RELEASE ORAL at 21:21

## 2022-04-04 RX ADMIN — FUROSEMIDE 40 MG: 40 TABLET ORAL at 11:25

## 2022-04-04 ASSESSMENT — PAIN SCALES - GENERAL
PAINLEVEL_OUTOF10: 0
PAINLEVEL_OUTOF10: 3

## 2022-04-04 NOTE — PROGRESS NOTES
Attempted to see patient this morning. She was sleeping and requested CTS to return later in the day to see her. Will attempt to see patient again this afternoon.

## 2022-04-04 NOTE — PROGRESS NOTES
Hospitalist Progress Note      PCP: Elaine Argueta MD    Date of Admission: 3/30/2022    Chief Complaint: [facility] Course: Boris Barbosa is a 75 y.o. female who presented to the ED to be evaluated for a several year history of heart palpitations, which became associated with weakness and dyspnea 3 months PTA. The patient reports that she has not been seen by any type of physician for greater than 2 decades. She is scheduled a new PCP appointment for earlier today, but when she arrived she was tachycardic into the 150s therefore EMS was contacted to transport her to the ED. Upon further questioning, patient endorses intermittent chest pain for which she takes a baby aspirin PRN. She states that she has noted increased THOMPSON as well as BLE edema ongoing since the beginning of 2022.     Upon arrival to the ED EKG was obtained revealing A. fib RVR with ventricular rate 141; PVCs, anterior infarct of unknown duration, L APB, and QTC prolongation also appreciated. CXR notable for bilateral pleural effusions as well as bibasilar opacities. Admitting hospitalist physician has ordered chest CT with PE protocol to further assess. Labs were essentially unremarkable excluding BNP 3634. Patient received IV Cardizem bolus and drip initiation per ED provider. She will be admitted for new onset A. fib with suspected decompensated CHF. Subjective: Resting, NAD. Updated on plan of care.  VSS, CT surg consulted      Medications:  Reviewed    Infusion Medications    sodium chloride      dilTIAZem Stopped (03/31/22 1339)     Scheduled Medications    furosemide  40 mg Oral Daily    metoprolol succinate  50 mg Oral BID    digoxin  125 mcg Oral Daily    sodium chloride flush  5-40 mL IntraVENous 2 times per day    enoxaparin  1 mg/kg SubCUTAneous BID    aspirin  162 mg Oral Once    aspirin  81 mg Oral Daily    [Held by provider] apixaban  5 mg Oral BID     PRN Meds: sodium chloride flush, sodium chloride, acetaminophen, butalbital-acetaminophen-caffeine, potassium chloride **OR** potassium alternative oral replacement **OR** potassium chloride, magnesium sulfate, senna, acetaminophen **OR** acetaminophen, perflutren lipid microspheres, ondansetron **OR** ondansetron, nitroGLYCERIN      Intake/Output Summary (Last 24 hours) at 4/4/2022 0831  Last data filed at 4/4/2022 0504  Gross per 24 hour   Intake --   Output 1500 ml   Net -1500 ml       Physical Exam Performed:    BP (!) 92/57   Pulse 87   Temp 98.6 °F (37 °C) (Oral)   Resp 16   Ht 5' (1.524 m)   Wt 101 lb 13.6 oz (46.2 kg)   SpO2 93%   BMI 19.89 kg/m²     General appearance: No apparent distress, appears stated age and cooperative. HEENT: Pupils equal, round, and reactive to light. Conjunctivae/corneas clear. Neck: Supple, with full range of motion. No jugular venous distention. Trachea midline. Respiratory:  Normal respiratory effort. Clear to auscultation, bilaterally without Rales/Wheezes/Rhonchi. Cardiovascular: Irregular rate and rhythm with BAY  Abdomen: Soft, non-tender, non-distended with normal bowel sounds. Musculoskeletal: No clubbing, cyanosis or edema bilaterally. Full range of motion without deformity. Skin: Skin color, texture, turgor normal.  No rashes or lesions. Neurologic:  Neurovascularly intact without any focal sensory/motor deficits.  Cranial nerves: II-XII intact, grossly non-focal.  Psychiatric: Alert and oriented, thought content appropriate, normal insight  Capillary Refill: Brisk,3 seconds, normal   Peripheral Pulses: +2 palpable, equal bilaterally       Labs:   Recent Labs     04/02/22  0532 04/03/22  0532   WBC 6.5 6.6   HGB 14.4 14.4   HCT 42.9 43.9    198     Recent Labs     04/02/22  0532 04/03/22  0532 04/04/22  0450    134* 133*   K 4.1 3.6 3.9   CL 95* 93* 92*   CO2 30 27 30   BUN 29* 30* 27*   CREATININE 0.9 0.7 0.8   CALCIUM 9.1 9.3 9.0     No results for input(s): AST, ALT, BILIDIR, BILITOT, ALKPHOS in the last 72 hours. No results for input(s): INR in the last 72 hours. No results for input(s): Jotalon Berthoud in the last 72 hours. Urinalysis:      Lab Results   Component Value Date    NITRU Negative 03/30/2022    BLOODU Negative 03/30/2022    SPECGRAV 1.010 03/30/2022    GLUCOSEU Negative 03/30/2022       Radiology:  CT CHEST PULMONARY EMBOLISM W CONTRAST   Final Result   No evidence of pulmonary embolism      Cardiomegaly, including severe left atrial enlargement. Small bilateral pleural effusions, right greater than left, with associated   atelectasis. Mild smooth interlobular septal thickening, favored to represent mild   interstitial pulmonary edema.          XR CHEST PORTABLE   Final Result   Bilateral pleural effusions right greater than left with bibasilar opacities   that could represent atelectasis or pneumonia                 Assessment/Plan:    Active Hospital Problems    Diagnosis     Mitral valve insufficiency [I34.0]     Atrial fibrillation with RVR (HCC) [I48.91]     New onset atrial fibrillation (HCC) [I48.91]     Decompensated heart failure (HCC) [I50.9]     Pleural effusion, bilateral [J90]     LAFB (left anterior fascicular block) [I44.4]     Cardiomegaly [E26.0]     Pansystolic murmur [F60.8]      New onset A. fib with RVR  -Tele  -Patient initiated on IV Cardizem bolus and drip, the latter of which will be continued overnight  -Electrolytes, thyroid studies to assess for secondary causes of this tachyarrhythmia  -Patient with CHADS2 score of 3, Eliquis initiated-however d/c'd this morning after Echo findings-start lovenox tx dose  -Echo: EF 55%, severe MR, mild AR, mild to mod TR  -Cardiology consulted  -LHC: nl coronaries, RHC High-nl left vent filling pressure, elevated wedge pressure, mil elevated pulm pressure  -CT surg consult MV repair     Chest pain   -ASA daily, BB  -FLP , HDL 70, Cholesterol 185; patient declined statin this evening  -Nitropaste applied to chest wall (BP permitting)  -Nuclear stress test not ordered at this time due to A. fib RVR presentation  -Serial troponin negative     Pulmonary edema  -Suspect decompensated CHF, although patient with no previous cardiac history or echo available for review  -Chest CT with PE protocol ordered revealing massive cardiomegaly with severe LAE enlargement  -Admit to floor for continuous telemetry monitoring and strict I's & O's  -Lasix changed to PO, wt down 2.6kg  -2G Na and fluid restriction dietary modifications in place    DVT Prophylaxis: Lovenox, scds  Diet: ADULT DIET; Regular; Low Fat/Low Chol/High Fiber/2 gm Na  Code Status: Full Code    PT/OT Eval Status: ambulatory    Dispo - pending course, several days, CT surg eval/plan.     Blake Pride, APRN - CNP

## 2022-04-04 NOTE — PLAN OF CARE
Problem: OXYGENATION/RESPIRATORY FUNCTION  Goal: Patient will achieve/maintain normal respiratory rate/effort  Description: Respiratory rate and effort will be within normal limits for the patient  Note: Respiratory rate WNL this shift. Problem: HEMODYNAMIC STATUS  Goal: Patient has stable vital signs and fluid balance  Note: Vital signs stable this shift. B/P's soft, trend appears to be soft, cardiology aware. Pt on room air. Patient's EF (Ejection Fraction) is greater than 40%    Heart Failure Medications:  Diuretics[de-identified] Furosemide    (One of the following REQUIRED for EF </= 40%/SYSTOLIC FAILURE but MAY be used in EF% >40%/DIASTOLIC FAILURE)        ACE[de-identified] None        ARB[de-identified] None         ARNI[de-identified] None    (Beta Blockers)  NON- Evidenced Based Beta Blocker (for EF% >40%/DIASTOLIC FAILURE): None    Evidenced Based Beta Blocker::(REQUIRED for EF% <40%/SYSTOLIC FAILURE) Metoprolol SUCCinate- Toprol XL  . .................................................................................................................................................. Patient's weights and intake/output reviewed: Yes    Patient's Last Weight: 101 lbs 13.6 oz obtained by bed scale. Difference of 1 lb more than last documented weight. Intake/Output Summary (Last 24 hours) at 4/4/2022 1902  Last data filed at 4/4/2022 1611  Gross per 24 hour   Intake 470 ml   Output 1800 ml   Net -1330 ml       Comorbidities Reviewed Yes    Patient has a past medical history of Varicose vein. >>For CHF and Comorbidity documentation on Education Time and Topics, please see Education Tab    Progressive Mobility Assessment:  What is this patient's Current Level of Mobility?: Ambulatory-Up Ad Anisha  How was this patient Mobilized today?: Edge of Bed,  Up to Toilet/Shower, and Up in Room                 With Whom? Self                 Level of Difficulty/Assistance: Independent     Pt resting in bed at this time on room air.  Pt denies shortness of breath at rest. Pt without lower extremity edema.      Patient and/or Family's stated Goal of Care this Admission: reduce shortness of breath, increase activity tolerance, and be more comfortable prior to discharge

## 2022-04-04 NOTE — CARE COORDINATION
CT team met with patient. Plan is for valve surgery on Wednesday. Spoke with patient in room. She is up walking around the room. She is aware of the plan. Patient normally lives home alone and is normally independent at home. CM will continue to follow.

## 2022-04-04 NOTE — PROGRESS NOTES
[address] 80   Daily Progress Note    Admit Date:  3/30/2022  HPI:    Chief Complaint   Patient presents with    Atrial Fibrillation     hx of afib, was at PCP today and told that her heart was going to quickly      [employer] potation's and shortness of breath. She was found to have atrial fibrillation with RVR on admission. An echocardiogram showed severe MR with flail posterior mitral valve leaflet. He underwent R/LHC on 4/1/2022 showing normal coronary arteries, mild elevation of filling pressures. CT surgery has been consulted for minimally invasive mitral valve correction. Subjective:  Ms. Suarez Graft sitting up in bed, still with shortness of breath but less so with walking to bathroom, no orthopnea. No chest pain. Objective:   Patient Vitals for the past 24 hrs:   BP Temp Temp src Pulse Resp SpO2 Weight   04/04/22 1023 95/64 -- -- 99 -- 92 % --   04/04/22 0505 -- -- -- -- -- -- 101 lb 13.6 oz (46.2 kg)   04/04/22 0501 (!) 92/57 98.6 °F (37 °C) Oral 87 16 93 % --   04/03/22 2348 106/73 98.5 °F (36.9 °C) Oral 78 16 95 % --   04/03/22 2013 116/73 98.3 °F (36.8 °C) Oral 104 16 93 % --   04/03/22 1708 -- -- -- 115 -- -- --   04/03/22 1615 104/69 97.6 °F (36.4 °C) Oral 102 -- 94 % --   04/03/22 1145 114/75 -- -- 97 -- -- --       Intake/Output Summary (Last 24 hours) at 4/4/2022 1044  Last data filed at 4/4/2022 1022  Gross per 24 hour   Intake 100 ml   Output 1000 ml   Net -900 ml     Wt Readings from Last 3 Encounters:   04/04/22 101 lb 13.6 oz (46.2 kg)   08/17/10 105 lb (47.6 kg)         ASSESSMENT:   1. A. fib with RVR: Rate better controlled, valvular  2. CHF, acute diastolic secondary to valvular disease: Weight up 1#, net -7.8 L, no rales or edema, JVP improved, still with dyspnea on exertion   3. Severe mitral regurgitation: With flail posterior mitral valve leaflet  4. Chest pain: non-ischemic, likely CHF/ MR      PLAN:  1. Continue Toprol XL 50 mg bid  2.  Continue Lasix 40 mg po daily  3. CVTS consult for severe MR - to see today  4. Daily weights, daily labs, strict I/O  5. Continue lovenox, holding on DOAC pending plans for surgery    Corinne White, APRN - CNP, 4/4/2022, 10:44 AM  [facility]   [phone]       Telemetry: A. fib   NYHA: III    Physical Exam:  General:  Awake, alert, NAD  Skin:  Warm and dry  Neck:  JVP 8 cm, - HJR  Chest: Clear to auscultation posteriorly  Cardiovascular: Irregularly irregular, normal W3N7, 4/6 diastolic murmur loudest at apex, no g/r  Abdomen:  Soft, nontender, +bowel sounds  Extremities:  No BLE edema      Medications:    furosemide  40 mg Oral Daily    metoprolol succinate  50 mg Oral BID    digoxin  125 mcg Oral Daily    sodium chloride flush  5-40 mL IntraVENous 2 times per day    enoxaparin  1 mg/kg SubCUTAneous BID    aspirin  162 mg Oral Once    aspirin  81 mg Oral Daily    [Held by provider] apixaban  5 mg Oral BID      sodium chloride      dilTIAZem Stopped (03/31/22 1339)       Lab Data: Lab results independently reviewed and analyzed by myself 4/4/2022    CBC:   Recent Labs     04/02/22  0532 04/03/22  0532   WBC 6.5 6.6   HGB 14.4 14.4    198     BMP:    Recent Labs     04/02/22  0532 04/03/22  0532 04/04/22  0450    134* 133*   K 4.1 3.6 3.9   CO2 30 27 30   BUN 29* 30* 27*   CREATININE 0.9 0.7 0.8     INR:    No results for input(s): INR in the last 72 hours. BNP:    Recent Labs     04/03/22  0532   PROBNP 1,211*     Cardiac Enzymes:   No results for input(s): TROPONINI in the last 72 hours. Lipids:   Lab Results   Component Value Date    TRIG 64 03/31/2022    HDL 70 03/31/2022    LDLCALC 102 03/31/2022       Cardiac Imaging:    CARDIAC CATHETERIZATION REPORT   Date of Procedure: 4/1/22  : Matt Parada MD   Primary Indication: Severe mitral regurgitation    Procedures Performed:  1. Coronary angiography  2. Left heart catheterization  3.   Right heart catheterization   Procedural Details:  1. Access: Local anesthetic was given and access was obtained in the right radial artery using a micropuncture technique and a 5F Terumo Slender Sheath was placed without difficulty. A 7F Terumo Slender Sheath was placed in the right brachial vein via wire exchange of an existing IV that was placed under sterile conditions. 2. Diagnostic: A 7F TD [place] catheter was used to perform the right heart catheterization. A 5F JR4 catheter and 5F JL3.5 catheter were used to perform selective right and left coronary angiography, respectively. A 5F Pigtail catheter was used to perform the left heart catheterization. No significant gradient was observed on pull-back of the catheter across the aortic valve. 3. Hemostasis: At the end of the procedure, the radial sheath was removed and a hemoband was placed over the arteriotomy site and filled with 15 cc air to maintain hemostasis. The venous sheath was moved and manual pressure applied to maintain hemostasis. Findings:  1. Hemodynamics:  A. Right heart catheterization                   1. RA:  6 mmHg                   2. RV:  34/5/7 mmHG                   3. PA:  37/18/27 mmHG                   4. PCWP: 22 mmHg                   5. Ignacio CO: 3.9 L/min                   6. Ignacio CI: 2.75  L/min*m2                   7. Ignacio SVR: 1571 d/s                   8. Transpulmonary gradient: 5       9. Ignacio PVR: 1 wood unit              B. Opening arterial pressure: 103/67/82              C. LVEDP: 15 mmhg  PA sat 71%  PW sat 95%  RA sat 70%  Ao sat 97% (on NRB at time of draw). 2.  Coronary anatomy:  A. Left main artery: The left main artery bifurcates into the left anterior descending artery and left circumflex artery. The left main artery was normal.   B. Left anterior descending artery: Transapical vessel which gives rise to 2 diagonal arteries. The left anterior descending artery had no disease. Diminutive vessel distally.  The diagonal arteries had no disease. C. Left circumflex artery: Non-dominant vessel that gives rise to 2 obtuse marginal arteries. The left circumflex artery had no disease. The first obtuse marginal artery had no diease. The second marginal artery was tortuous with no disease. D. Right coronary artery: Large Dominant vessel that gives rise to the posterior descending artery and posterolateral branch. The right coronary artery had no disease. The posterior descending artery had no disease. The posterolateral branch had no disease. Technical Factors:  Complications:  None  Estimated blood loss: <15cc  Sedation: Moderate conscious sedation was administered by qaulified nursing personnel under continuous hemodynamic monitoring, starting at 15:27 and ending at 16:46. Medications: 2 mg Versed, 25 mcg Fentanyl  Contrast: 100 cc of Isovue    Impression:  1. Normal coronaries. 2.  High-Normal left ventricular filling pressure. 3.  Normal cardiac index  4. Elevated wedge pressure - difficulty wedging (95% sat however, at time of recording). 5.  Mildly elevated pulmonary pressure/right sided filling pressure   Plan:  1. One more day of IV diuresis and consider transition to oral diuretic. 2.  Continue metoprolol, aspirin   3. CT surgery to evaluate for minimally invasive mitral valve repair. My preference would be that the patient has inpatient surgery early next week as she presented with decompensated congestive heart failure and atrial fibrillation as a result of her severe MR. This will prove difficult to manage on OP basis. She may however, seek second opinion and will need close follow up if choose to do so with follow up with our service in 1 week on discharge. Echo 3/31/2022  Summary   Irregular heart rate throughout study. Normal left ventricle systolic function with an estimated ejection fraction of 55%. No regional wall motion abnormalities are seen.    Diastolic dysfunction grade and filling pressure are indeterminate due to arrhythmia. The right ventricle is normal in size and function. Gigantic left atrium. The right atrium is mildly dilated. Severely thickened mitral valve leaflets. The posterior mitral valve leaflet appears flail with likely ruptured chordae, resulting in severe eccentric, anteriorly-directed mitral regurgitation. Degree of left atrial enlargement suggests chronic MR, Cannot however, rule out super-imposed vegetation/endocarditis. Clinical correlation advised. Mild aortic valve regurgitation. Mild to moderate tricuspid valve regurgitation. Systolic pulmonary artery pressure (sPAP) is normal and estimated at 30 mmHg (right atrial pressure 8 mmHg)   There is a small right pleural effusion. No prior studies available for comparison.

## 2022-04-04 NOTE — PROGRESS NOTES
BP 95/64   Pulse 99   Temp 98.6 °F (37 °C) (Oral)   Resp 16   Ht 5' (1.524 m)   Wt 101 lb 13.6 oz (46.2 kg)   SpO2 92%   BMI 19.89 kg/m²  on room air. Pt resting quietly in bed. Pt denies pain, discomfort or shortness of breath at this time. Lungs clear, diminished. Atrial Fibrillation on tele. Pt denies any needs at this time. Bedside table, call light, fluids, lap top within reach. Pt instructed to call out for any needs and assistance. B/P soft this a.m, appears B/P trend is soft. Writer placed call to cardiology. Awaiting call from Marcos Epperson to see if would like parameters on lasix and metoprolol; held at this time. Sheila Frost RN  4/4/2022    1119 writer received call from Marcos Epperson, to give metoprolol and lasix.   Sheila Frost RN  4/4/2022

## 2022-04-04 NOTE — CONSULTS
Department of Cardiovascular & Thoracic Surgery  History and Physical          DIAGNOSIS:  Severe mitral valve regurgitation     CHIEF COMPLAINT:    Chief Complaint   Patient presents with    Atrial Fibrillation     hx of afib, was at PCP today and told that her heart was going to quickly       History Obtained From:  patient, electronic medical record    HISTORY OF PRESENT ILLNESS:      The patient is a 75 y.o. female with no significant past medical history who presented to [facility] ED on 3/30 with complaints of several months of fatigue, generalized weakness, shortness of breath, palpitations, and lower extremity swelling. She initially presented to a PCP on 3/30 to establish care (had not seen PCP in over 20 years). She was found to be in afib with RVR and directed to come to the ED. EKG in ED confirmed afib with RVR, rate in the 140s. CBC and BMP unremarkable. BNP 3,634. Troponin negative. CXR with bilateral pleural effusions. Cardizem gtt was initiated and the patient was admitted for further evaluation and treatment. CT chest PE protocol 3/30 showed no evidence of PE but mild pulmonary edema and severe left atrial enlargement. TTE 3/31 showed severe mitral regurgitation, flail posterior mitral valve leaflet. LHC 4/1 with normal coronaries. XAVIER 4/1 confirmed severe MR with flail posterior mitral valve leaflet. We have been consulted for consideration of surgical intervention. Past Medical History:        Diagnosis Date    Varicose vein        Past Surgical History:        Procedure Laterality Date    TONSILLECTOMY         Medications Prior to Admission:   Medications Prior to Admission: aspirin 300 MG suppository, Place 300 mg rectally every 6 hours as needed. Allergies:  Dairycare [lactase-lactobacillus], Eggs or egg-derived products, and Lactose intolerance (gi)    Social History:    TOBACCO:   reports that she has never smoked. She does not have any smokeless tobacco history on file.   ETOH:   reports no history of alcohol use. CAFFEINE ABUSE:  No  DRUGS:   reports no history of drug use. LIFESTYLE: able to perform ADLs, but mostly just doing activities/walking around the house     MARITAL STATUS:  single  OCCUPATION:  Retired     Family History:        Problem Relation Age of Onset    Cancer Father     Cancer Paternal Aunt     Cancer Paternal Grandmother        REVIEW OF SYSTEMS:      Constitutional:  No night sweats, headaches, weight loss. +fatigue, generalized weakness. Eyes:  No glaucoma, cataracts. ENMT:  No nosebleeds, deviated septum. Cardiac:  + arrhythmias, palpitations. No chest pain. Vascular:  No claudication, varicosities. GI:  No PUD, heartburn. :  No kidney stones, frequent UTIs  Musculoskeletal:  No arthritis, gout. Respiratory:  + SOB. No emphysema, asthma. Integumentary:  No dermatitis, itching, rash. Neurological:  No stroke, TIAs, seizures. Psychiatric:  No depression, anxiety. Endocrine: No diabetes, thyroid issues. Hematologic:  No bleeding, easy bruising. Immunologic:  No known cancer, steroid therapies. PHYSICAL EXAM:    VITALS:  BP 95/64   Pulse 99   Temp 98.6 °F (37 °C) (Oral)   Resp 16   Ht 5' (1.524 m)   Wt 101 lb 13.6 oz (46.2 kg)   SpO2 92%   BMI 19.89 kg/m²     Constitutional:  Well developed and nourished female. No acute distress. Eyes:  lids and lashes normal, pupils equal, round and reactive to light, extra ocular muscles intact, sclera clear, conjunctiva normal    Head/ENT:   normal teeth, gums, & palate. Moist mucus membranes. No cyanosis or pallor. Neck:  supple, symmetrical, trachea midline, no lymphadenopathy, distended neck veins, no carotid bruits and MASSES:  no masses. Lungs:  no increased work of breathing, good air exchange, no retractions and clear to auscultation. Cardiovascular:  irregularly irregular rhythm, S1, S2 normal and atrial fibrillation on the monitor with HR in the low 100s .        Pulses:  Right dorsalis pedis 2, Left dorsalis pedis 2, Right posterior tibial 2, Left Posterior tibial 2, Right radial 2, and Left radial 2. Abdomen:  normal bowel sounds, non-tender, unable to evaluate abdominal aorta due to body habitus. No hepatosplenomegaly or masses. Musculoskeletal:  Back is straight and non-tender, full ROM of upper and lower extremities. No kyphosis or scoliosis. Extremities:  Warm, pink, no clubbing, cyanosis, petechiae, ischemia, or deformities. Trace peripheral edema. Skin: no rashes, no petechiae, no nodules, no jaundice, no purpura, no wounds    Neurological/Psychiatric: oriented, normal mood, grossly non-focal    DATA:  EKG:    3/30/22  Atrial fibrillation with rapid ventricular response with premature ventricular or aberrantly conducted complexes. Left posterior fascicular block. Anterior infarct, age undetermined. Abnormal ECG. No previous ECGs available. Confirmed by David Farias ([id number]) on 3/30/2022 5:20:14 PM    4/1/22  Atrial fibrillation with rapid ventricular response. Rightward axis. Minimal voltage criteria for LVH, may be normal variant. Abnormal ECG. When compared with ECG of 30-MAR-2022 15:56, No significant change was found.  Confirmed by David Farias ([id number]) on 4/1/2022 3:54:35 PM    CBC:   Lab Results   Component Value Date    WBC 6.6 04/03/2022    RBC 4.57 04/03/2022    HGB 14.4 04/03/2022    HCT 43.9 04/03/2022    MCV 95.9 04/03/2022    MCH 31.4 04/03/2022    MCHC 32.8 04/03/2022    RDW 14.1 04/03/2022     04/03/2022    MPV 7.3 04/03/2022     CMP:    Lab Results   Component Value Date     04/04/2022    K 3.9 04/04/2022    K 3.9 03/31/2022    CL 92 04/04/2022    CO2 30 04/04/2022    BUN 27 04/04/2022    CREATININE 0.8 04/04/2022    GFRAA >60 04/04/2022    AGRATIO 1.5 03/31/2022    LABGLOM >60 04/04/2022    GLUCOSE 118 04/04/2022    PROT 7.5 03/31/2022    LABALBU 4.5 03/31/2022    CALCIUM 9.0 04/04/2022    BILITOT 0.8 03/31/2022    ALKPHOS 83 03/31/2022 AST 29 03/31/2022    ALT 18 03/31/2022     PT/INR:    Lab Results   Component Value Date    PROTIME 14.7 03/30/2022    INR 1.29 03/30/2022     Last 3 Troponin:    Lab Results   Component Value Date    TROPONINI <0.01 03/31/2022    TROPONINI <0.01 03/30/2022    TROPONINI <0.01 03/30/2022     HgBA1c:    Lab Results   Component Value Date    LABA1C 5.7 03/30/2022     CXRAY:  3/30/22  FINDINGS:   Cardiomegaly.  Bilateral pleural effusions right greater than left.  No   pneumothorax.  Bibasilar opacities.  No pulmonary vascular congestion.           Impression   Bilateral pleural effusions right greater than left with bibasilar opacities   that could represent atelectasis or pneumonia     TTE: 3/31/22  Summary   Irregular heart rate throughout study. Normal left ventricle systolic function with an estimated ejection fraction   of 55%. No regional wall motion abnormalities are seen. Diastolic dysfunction grade and filling pressure are indeterminate due to   arrhythmia. The right ventricle is normal in size and function. Gigantic left atrium. The right atrium is mildly dilated. Severely thickened mitral valve leaflets. The posterior mitral valve leaflet   appears flail with likely ruptured chordae, resulting in severe eccentric,   anteriorly-directed mitral regurgitation. Degree of left atrial enlargement   suggests chronic MR, Cannot however, rule out super-imposed   vegetation/endocarditis. Clinical correlation advised. Mild aortic valve regurgitation. Mild to moderate tricuspid valve regurgitation. Systolic pulmonary artery pressure (sPAP) is normal and estimated at 30 mmHg   (right atrial pressure 8 mmHg)   There is a small right pleural effusion. No prior studies available for comparison. XAVIER: 4/1/22  Summary   The left ventricle appears mildly dilated with preserved systolic function. Ejection fraction is visually estimated at 55%. Normal right ventricular size and function.    Gigantic left atrium. No left atrial/left atrial appendage thrombus. Flail posterior mitral valve leaflet with ruptured chordae seen prolapsing   into Left atrium. Severe anterior-directed, eccentric mitral regurgitation. Trace aortic regurgitation. Mild tricuspid regurgitation. Mild pulmonic regurgitation present. A bubble study was performed and showed delayed evidence of right to left   shunt consistent with a small intrapulmonary shunt. CT Chest:  3/30/22  FINDINGS:   Pulmonary Arteries: Pulmonary arteries are adequately opacified for   evaluation.  No evidence of intraluminal filling defect to suggest pulmonary   embolism.  Main pulmonary artery is normal in caliber.       Mediastinum: No evidence of mediastinal lymphadenopathy.  No pericardial   effusion.  Cardiomegaly with severe left atrial enlargement.  There is no   acute abnormality of the thoracic aorta.       Lungs/pleura: Atelectasis of the right lower middle and lower lobes adjacent   to the severe cardiomegaly.  No definite focal airspace consolidation.  Small   bilateral pleural effusions, right greater than left.  Mild smooth   interlobular septal thickening.       Upper Abdomen: Simple cyst in the right hepatic lobe.  Scattered calcified   granulomas in the spleen       Soft Tissues/Bones: No acute bone or soft tissue abnormality.           Impression   No evidence of pulmonary embolism       Cardiomegaly, including severe left atrial enlargement.       Small bilateral pleural effusions, right greater than left, with associated   atelectasis.       Mild smooth interlobular septal thickening, favored to represent mild   interstitial pulmonary edema. [facility]:  4/1/22  Findings:  Hemodynamics:  A.  Right heart catheterization                   1. RA:  6 mmHg                   2. RV:  34/5/7 mmHG                   3. PA:  37/18/27 mmHG                   4. PCWP: 22 mmHg                   5. Ignacio CO: 3.9 L/min                   6. Ignacio CI: 2.75 L/min*m2                   7. Ignacio SVR: 1571 d/s                   8. Transpulmonary gradient: 5       9. Ignacio PVR: 1 wood unit              B. Opening arterial pressure: 103/67/82              C. LVEDP: 15 mmhg  PA sat 71%  PW sat 95%  RA sat 70%  Ao sat 97% (on NRB at time of draw).      2. Coronary anatomy:  A. Left main artery: The left main artery bifurcates into the left anterior descending artery and left circumflex artery. The left main artery was normal.   B. Left anterior descending artery: Transapical vessel which gives rise to 2 diagonal arteries. The left anterior descending artery had no disease. Diminutive vessel distally. The diagonal arteries had no disease. C. Left circumflex artery: Non-dominant vessel that gives rise to 2 obtuse marginal arteries. The left circumflex artery had no disease. The first obtuse marginal artery had no diease. The second marginal artery was tortuous with no disease. D. Right coronary artery: Large Dominant vessel that gives rise to the posterior descending artery and posterolateral branch. The right coronary artery had no disease. The posterior descending artery had no disease. The posterolateral branch had no disease. [id number] Score for Atrial Fibrillation Stroke Risk   Risk   Factors  Component Value   C CHF No 0   H HTN No 0   A2 Age >= 76 No,  (76 y.o.) 0   D DM No 0   S2 Prior Stroke/TIA No 0   V Vascular Disease No 0   A Age 74-69 Yes,  (76 y.o.) 1   Sc Sex female 1    [id number]  Score  2   Score last updated 4/4/22 64:66 AM EDT    Click here for a link to the UpToDate guideline \"Atrial Fibrillation: Anticoagulation therapy to prevent embolization    Disclaimer: Risk Score calculation is dependent on accuracy of patient problem list and past encounter diagnosis.     ASSESSMENT AND PLAN:    Ms. Denver Bello is a 75 y.o. female admitted with new onset atrial fibrillation and CHF who was found to have severe, eccentric mitral valve regurgitation with flail PMVL. LHC shows normal coronary arteries. Case discussed with Dr. Tacho Bobo. Recommend minimal access mitral valve repair with MAZE and ISAAC clip. Will plan for OR on Wednesday. This plan was discussed with the patient and she is in agreement. Will continue with pre-operative testing and risk stratification. Carotid duplex. PFTs.     CHARLES Ramirez CNP   4/4/2022  10:29 AM

## 2022-04-05 ENCOUNTER — APPOINTMENT (OUTPATIENT)
Dept: GENERAL RADIOLOGY | Age: 67
DRG: 216 | End: 2022-04-05
Payer: MEDICARE

## 2022-04-05 ENCOUNTER — ANESTHESIA EVENT (OUTPATIENT)
Dept: OPERATING ROOM | Age: 67
DRG: 216 | End: 2022-04-05
Payer: MEDICARE

## 2022-04-05 LAB
ABO/RH: NORMAL
ANION GAP SERPL CALCULATED.3IONS-SCNC: 13 MMOL/L (ref 3–16)
ANTIBODY SCREEN: NORMAL
BUN BLDV-MCNC: 28 MG/DL (ref 7–20)
CALCIUM SERPL-MCNC: 9.1 MG/DL (ref 8.3–10.6)
CHLORIDE BLD-SCNC: 91 MMOL/L (ref 99–110)
CO2: 28 MMOL/L (ref 21–32)
CREAT SERPL-MCNC: 0.8 MG/DL (ref 0.6–1.2)
EKG ATRIAL RATE: 288 BPM
EKG DIAGNOSIS: NORMAL
EKG Q-T INTERVAL: 350 MS
EKG QRS DURATION: 98 MS
EKG QTC CALCULATION (BAZETT): 435 MS
EKG R AXIS: 100 DEGREES
EKG T AXIS: -11 DEGREES
EKG VENTRICULAR RATE: 93 BPM
GFR AFRICAN AMERICAN: >60
GFR NON-AFRICAN AMERICAN: >60
GLUCOSE BLD-MCNC: 98 MG/DL (ref 70–99)
HCT VFR BLD CALC: 41.5 % (ref 36–48)
HEMOGLOBIN: 13.8 G/DL (ref 12–16)
MAGNESIUM: 2 MG/DL (ref 1.8–2.4)
MCH RBC QN AUTO: 31.6 PG (ref 26–34)
MCHC RBC AUTO-ENTMCNC: 33.3 G/DL (ref 31–36)
MCV RBC AUTO: 94.7 FL (ref 80–100)
PDW BLD-RTO: 13.9 % (ref 12.4–15.4)
PLATELET # BLD: 221 K/UL (ref 135–450)
PMV BLD AUTO: 7.5 FL (ref 5–10.5)
POTASSIUM SERPL-SCNC: 4 MMOL/L (ref 3.5–5.1)
RBC # BLD: 4.38 M/UL (ref 4–5.2)
SODIUM BLD-SCNC: 132 MMOL/L (ref 136–145)
TROPONIN: 0.01 NG/ML
WBC # BLD: 9.1 K/UL (ref 4–11)

## 2022-04-05 PROCEDURE — 93010 ELECTROCARDIOGRAM REPORT: CPT | Performed by: INTERNAL MEDICINE

## 2022-04-05 PROCEDURE — 86923 COMPATIBILITY TEST ELECTRIC: CPT

## 2022-04-05 PROCEDURE — 36415 COLL VENOUS BLD VENIPUNCTURE: CPT

## 2022-04-05 PROCEDURE — 2060000000 HC ICU INTERMEDIATE R&B

## 2022-04-05 PROCEDURE — 80048 BASIC METABOLIC PNL TOTAL CA: CPT

## 2022-04-05 PROCEDURE — 99232 SBSQ HOSP IP/OBS MODERATE 35: CPT | Performed by: NURSE PRACTITIONER

## 2022-04-05 PROCEDURE — 6370000000 HC RX 637 (ALT 250 FOR IP): Performed by: HOSPITALIST

## 2022-04-05 PROCEDURE — 2580000003 HC RX 258: Performed by: INTERNAL MEDICINE

## 2022-04-05 PROCEDURE — 85027 COMPLETE CBC AUTOMATED: CPT

## 2022-04-05 PROCEDURE — 6360000002 HC RX W HCPCS: Performed by: PEDIATRICS

## 2022-04-05 PROCEDURE — 99233 SBSQ HOSP IP/OBS HIGH 50: CPT | Performed by: NURSE PRACTITIONER

## 2022-04-05 PROCEDURE — 6360000002 HC RX W HCPCS: Performed by: INTERNAL MEDICINE

## 2022-04-05 PROCEDURE — 6360000002 HC RX W HCPCS: Performed by: NURSE PRACTITIONER

## 2022-04-05 PROCEDURE — 83735 ASSAY OF MAGNESIUM: CPT

## 2022-04-05 PROCEDURE — 6370000000 HC RX 637 (ALT 250 FOR IP): Performed by: NURSE PRACTITIONER

## 2022-04-05 PROCEDURE — 93005 ELECTROCARDIOGRAM TRACING: CPT | Performed by: PEDIATRICS

## 2022-04-05 PROCEDURE — 71045 X-RAY EXAM CHEST 1 VIEW: CPT

## 2022-04-05 PROCEDURE — 84484 ASSAY OF TROPONIN QUANT: CPT

## 2022-04-05 PROCEDURE — 86901 BLOOD TYPING SEROLOGIC RH(D): CPT

## 2022-04-05 PROCEDURE — 86900 BLOOD TYPING SEROLOGIC ABO: CPT

## 2022-04-05 PROCEDURE — 86850 RBC ANTIBODY SCREEN: CPT

## 2022-04-05 RX ORDER — LORAZEPAM 0.5 MG/1
0.25 TABLET ORAL ONCE
Status: COMPLETED | OUTPATIENT
Start: 2022-04-05 | End: 2022-04-05

## 2022-04-05 RX ORDER — LEVALBUTEROL 1.25 MG/.5ML
0.63 SOLUTION, CONCENTRATE RESPIRATORY (INHALATION) EVERY 8 HOURS PRN
Status: DISCONTINUED | OUTPATIENT
Start: 2022-04-05 | End: 2022-04-06

## 2022-04-05 RX ORDER — BISACODYL 10 MG
10 SUPPOSITORY, RECTAL RECTAL ONCE
Status: DISCONTINUED | OUTPATIENT
Start: 2022-04-05 | End: 2022-04-06

## 2022-04-05 RX ORDER — FUROSEMIDE 20 MG/1
20 TABLET ORAL ONCE
Status: COMPLETED | OUTPATIENT
Start: 2022-04-05 | End: 2022-04-05

## 2022-04-05 RX ORDER — SODIUM CHLORIDE, SODIUM LACTATE, POTASSIUM CHLORIDE, CALCIUM CHLORIDE 600; 310; 30; 20 MG/100ML; MG/100ML; MG/100ML; MG/100ML
INJECTION, SOLUTION INTRAVENOUS CONTINUOUS
Status: DISCONTINUED | OUTPATIENT
Start: 2022-04-06 | End: 2022-04-06

## 2022-04-05 RX ORDER — CHLORHEXIDINE GLUCONATE 0.12 MG/ML
15 RINSE ORAL ONCE
Status: COMPLETED | OUTPATIENT
Start: 2022-04-06 | End: 2022-04-06

## 2022-04-05 RX ORDER — CHLORHEXIDINE GLUCONATE 4 G/100ML
SOLUTION TOPICAL SEE ADMIN INSTRUCTIONS
Status: DISCONTINUED | OUTPATIENT
Start: 2022-04-05 | End: 2022-04-06

## 2022-04-05 RX ORDER — PANTOPRAZOLE SODIUM 40 MG/1
40 TABLET, DELAYED RELEASE ORAL ONCE
Status: COMPLETED | OUTPATIENT
Start: 2022-04-05 | End: 2022-04-05

## 2022-04-05 RX ORDER — MORPHINE SULFATE 2 MG/ML
2 INJECTION, SOLUTION INTRAMUSCULAR; INTRAVENOUS EVERY 4 HOURS PRN
Status: DISCONTINUED | OUTPATIENT
Start: 2022-04-05 | End: 2022-04-06

## 2022-04-05 RX ORDER — DEXAMETHASONE SODIUM PHOSPHATE 4 MG/ML
4 INJECTION, SOLUTION INTRA-ARTICULAR; INTRALESIONAL; INTRAMUSCULAR; INTRAVENOUS; SOFT TISSUE 2 TIMES DAILY
Status: COMPLETED | OUTPATIENT
Start: 2022-04-05 | End: 2022-04-05

## 2022-04-05 RX ORDER — ASPIRIN 81 MG/1
81 TABLET ORAL ONCE
Status: COMPLETED | OUTPATIENT
Start: 2022-04-06 | End: 2022-04-06

## 2022-04-05 RX ADMIN — FUROSEMIDE 20 MG: 20 TABLET ORAL at 16:27

## 2022-04-05 RX ADMIN — DIGOXIN 125 MCG: 125 TABLET ORAL at 08:16

## 2022-04-05 RX ADMIN — PANTOPRAZOLE SODIUM 40 MG: 40 TABLET, DELAYED RELEASE ORAL at 16:27

## 2022-04-05 RX ADMIN — ENOXAPARIN SODIUM 50 MG: 60 INJECTION SUBCUTANEOUS at 08:16

## 2022-04-05 RX ADMIN — MUPIROCIN: 20 OINTMENT TOPICAL at 21:56

## 2022-04-05 RX ADMIN — METOPROLOL SUCCINATE 50 MG: 50 TABLET, EXTENDED RELEASE ORAL at 08:16

## 2022-04-05 RX ADMIN — SODIUM CHLORIDE, PRESERVATIVE FREE 10 ML: 5 INJECTION INTRAVENOUS at 08:17

## 2022-04-05 RX ADMIN — CHLORHEXIDINE GLUCONATE: 213 SOLUTION TOPICAL at 21:56

## 2022-04-05 RX ADMIN — MORPHINE SULFATE 2 MG: 2 INJECTION, SOLUTION INTRAMUSCULAR; INTRAVENOUS at 14:34

## 2022-04-05 RX ADMIN — SODIUM CHLORIDE, PRESERVATIVE FREE 10 ML: 5 INJECTION INTRAVENOUS at 21:56

## 2022-04-05 RX ADMIN — METOPROLOL SUCCINATE 50 MG: 50 TABLET, EXTENDED RELEASE ORAL at 21:56

## 2022-04-05 RX ADMIN — DEXAMETHASONE SODIUM PHOSPHATE 4 MG: 4 INJECTION, SOLUTION INTRAMUSCULAR; INTRAVENOUS at 09:36

## 2022-04-05 RX ADMIN — NITROGLYCERIN 0.4 MG: 0.4 TABLET, ORALLY DISINTEGRATING SUBLINGUAL at 14:23

## 2022-04-05 RX ADMIN — NITROGLYCERIN 0.4 MG: 0.4 TABLET, ORALLY DISINTEGRATING SUBLINGUAL at 14:28

## 2022-04-05 RX ADMIN — ASPIRIN 81 MG: 81 TABLET, COATED ORAL at 08:16

## 2022-04-05 RX ADMIN — DEXAMETHASONE SODIUM PHOSPHATE 4 MG: 4 INJECTION, SOLUTION INTRAMUSCULAR; INTRAVENOUS at 21:56

## 2022-04-05 RX ADMIN — LORAZEPAM 0.25 MG: 0.5 TABLET ORAL at 21:56

## 2022-04-05 ASSESSMENT — PAIN SCALES - GENERAL
PAINLEVEL_OUTOF10: 0
PAINLEVEL_OUTOF10: 0
PAINLEVEL_OUTOF10: 8
PAINLEVEL_OUTOF10: 6

## 2022-04-05 ASSESSMENT — PAIN DESCRIPTION - DESCRIPTORS: DESCRIPTORS: BURNING

## 2022-04-05 ASSESSMENT — PAIN DESCRIPTION - LOCATION: LOCATION: CHEST

## 2022-04-05 NOTE — ACP (ADVANCE CARE PLANNING)
Advance Care Planning     Advance Care Planning Inpatient Note  Spiritual Care Department    Today's Date: 4/5/2022  Unit: [id number] [id number] PCU    Received request from IDT Member. Upon review of chart and communication with care team, patient's decision making abilities are not in question. . Patient was/were present in the room during visit. Goals of ACP Conversation:  Discuss advance care planning documents    Health Care Decision Makers:       Primary Decision Maker: Leti Matias - Niece/Nephew - [phone]    Secondary Decision Maker: Rody Hyatt - Other - [phone]    Summary:  Completed New Documents    Advance Care Planning Documents (Patient Wishes):  Healthcare Power of /Advance Directive Appointment of Health Care Agent  Living Will/Advance Directive     Assessment:  Pt fearful about what might be the outcome of her surgery. Tearful and grieving the loss of her brother and her mother. Pt is scared that she \"does not want to place the responsibility of making her healthcare decisions and end of life decisions in the shoulders of anyone. I don't want them to feel its on them, I want to do it myself. I want to fix all the messes in my life and that's the most important thing in my life right now. \" Pt not , no children. Has support of her nephews and nieces. Appointed her nephew Cindy Whitley and his wife Analy Nelson as decision makers. Document completed and copies given to pt. Educated to call spiritual care should she decide to make changes. Interventions:  Provided education on documents for clarity and greater understanding  Assisted in the completion of documents according to patient's wishes at this time      Outcomes/Plan:  ACP Discussion: Completed  New advance directive completed. Returned original document(s) to patient, as well as copies for distribution to appointed agents  Copy of advance directive given to staff to scan into medical record.   Teach Back Method used to verify the patient's and/or Healthcare Decision Maker's understanding of key information in the advance directive documents    Electronically signed by Chaplain Ubaldo on 4/5/2022 at 4:48 PM

## 2022-04-05 NOTE — FLOWSHEET NOTE
Patient's EF (Ejection Fraction) is greater than 40%    Heart Failure Medications:   Diuretics[de-identified] Furosemide     (One of the following REQUIRED for EF </= 40%/SYSTOLIC FAILURE but MAY be used in EF% >40%/DIASTOLIC FAILURE)        ACE[de-identified] None        ARB[de-identified] None         ARNI[de-identified] None    (Beta Blockers)   NON- Evidenced Based Beta Blocker (for EF% >40%/DIASTOLIC FAILURE):      Evidenced Based Beta Blocker::(REQUIRED for EF% <40%/SYSTOLIC FAILURE) Metoprolol SUCCinate- Toprol XL  . .................................................................................................................................................. Patient's weights and intake/output reviewed: Yes    Patient's Last Weight: 101 lbs 13.6oz lbs obtained by bed scale. Difference of 1 lbs more than last documented weight. Intake/Output Summary (Last 24 hours) at 4/4/2022 2200  Last data filed at 4/4/2022 1611  Gross per 24 hour   Intake 470 ml   Output 1600 ml   Net -1130 ml       Comorbidities Reviewed Yes    Patient has a past medical history of Varicose vein. >>For CHF and Comorbidity documentation on Education Time and Topics, please see Education Tab    Progressive Mobility Assessment:  What is this patient's Current Level of Mobility?: Ambulatory-Up Ad Anisha  How was this patient Mobilized today?: Edge of Bed, Up to Chair and Up in Room, ambulated  ft                 With Whom? Nurse, PCA                 Level of Difficulty/Assistance: Independent     Pt resting in bed at this time on room air. Pt denies shortness of breath. Pt without lower extremity edema.      Patient and/or Family's stated Goal of Care this Admission: increase activity tolerance, better understand heart failure and disease management and be more comfortable prior to discharge        :

## 2022-04-05 NOTE — FLOWSHEET NOTE
04/05/22 0814   Vital Signs   Temp 99 °F (37.2 °C)   Temp Source Oral   Pulse 101   Heart Rate Source Monitor   Resp 18   /77   BP Location Left upper arm   MAP (mmHg) 92   Patient Position Semi fowlers   Level of Consciousness Alert (0)   MEWS Score 2   Patient Currently in Pain Denies   Pain Assessment   Pain Assessment 0-10   Pain Level 0   Oxygen Therapy   SpO2 92 %   O2 Device None (Room air)

## 2022-04-05 NOTE — PROGRESS NOTES
Pt called out stating she was having CP. This RN walked into room and patient stated CP 6/10 and felt like burning radiating to throat. 1423: BP: 103/73   - Administered 0.4mg Nitro     1427: BP: 109/69 HR:104  - No relief    1428: Administered 0.4mg Nitro     1433: BP: 95/52 HR: 87  (Nitro order ). 1434: No relief with 2 nitros. Administered 2mg Morphine    1441: Called CTS to inform them    1443: Paged Jose Crawford with cardiology     [phone]: Received call back.  Added protonix for GI cocktail

## 2022-04-05 NOTE — PLAN OF CARE
Problem: OXYGENATION/RESPIRATORY FUNCTION  Goal: Patient will maintain patent airway  Outcome: Met This Shift     Problem: OXYGENATION/RESPIRATORY FUNCTION  Goal: Patient will achieve/maintain normal respiratory rate/effort  Description: Respiratory rate and effort will be within normal limits for the patient  4/4/2022 2206 by Peggy Lemon RN  Outcome: Met This Shift     Problem: HEMODYNAMIC STATUS  Goal: Patient has stable vital signs and fluid balance  4/4/2022 2206 by Peggy Lemon RN  Outcome: Ongoing     Problem: FLUID AND ELECTROLYTE IMBALANCE  Goal: Fluid and electrolyte balance are achieved/maintained  Outcome: Ongoing     Problem: ACTIVITY INTOLERANCE/IMPAIRED MOBILITY  Goal: Mobility/activity is maintained at optimum level for patient  Outcome: Ongoing

## 2022-04-05 NOTE — PROGRESS NOTES
[facility] [address]   Daily Progress Note    Admit Date:  3/30/2022  HPI:    Chief Complaint   Patient presents with    Atrial Fibrillation     hx of afib, was at PCP today and told that her heart was going to quickly      [employer] potation's and shortness of breath. She was found to have atrial fibrillation with RVR on admission. An echocardiogram showed severe MR with flail posterior mitral valve leaflet. He underwent R/LHC on 4/1/2022 showing normal coronary arteries, mild elevation of filling pressures. CT surgery has been consulted for minimally invasive mitral valve correction. Subjective:  Ms. Kathy Davis sitting up in bed, + cough. No chest pain. Objective:   Patient Vitals for the past 24 hrs:   BP Temp Temp src Pulse Resp SpO2 Weight   04/05/22 0814 121/77 99 °F (37.2 °C) Oral 101 18 92 % --   04/05/22 0610 116/69 98.8 °F (37.1 °C) Oral 81 17 93 % 95 lb 1.6 oz (43.1 kg)   04/04/22 2331 117/77 99.1 °F (37.3 °C) Oral 102 17 92 % --   04/04/22 2118 104/74 98.4 °F (36.9 °C) Oral 86 16 93 % --   04/04/22 1606 98/61 98.4 °F (36.9 °C) Oral 85 16 93 % --   04/04/22 1123 122/77 99.1 °F (37.3 °C) Oral 96 16 93 % --       Intake/Output Summary (Last 24 hours) at 4/5/2022 1111  Last data filed at 4/5/2022 0826  Gross per 24 hour   Intake 360 ml   Output 1600 ml   Net -1240 ml     Wt Readings from Last 3 Encounters:   04/05/22 95 lb 1.6 oz (43.1 kg)   08/17/10 105 lb (47.6 kg)         ASSESSMENT:   1. A. fib with RVR: Rate better controlled, valvular  2. CHF, acute diastolic secondary to valvular disease: Weight down 6#, net -9.1 L, no rales or edema, JVP improved, still with moist cough   3. Severe mitral regurgitation: With flail posterior mitral valve leaflet  4. Chest pain: non-ischemic, likely CHF/ MR      PLAN:  1. Continue Toprol XL 50 mg bid  2. Go ahead and give Lasix 20 mg today  3. CVTS consult for severe MR - for MV surgery tomorrow  4. Daily weights, daily labs, strict I/O  5.  Continue lovenox for anticoagulation   6. CXR repeated per CVTS - ordering steroids       Libby Garsia, APRN - CNP, 4/5/2022, 11:11 AM  [facility] [address]   [phone]       Telemetry: A. fib   NYHA: III    Physical Exam:  General:  Awake, alert, NAD  Skin:  Warm and dry  Neck:  JVP 9-10 cm, - HJR  Chest: Clear to auscultation though with moist persistent cough  Cardiovascular: Irregularly irregular, normal L6M2, 4/6 diastolic murmur loudest at apex, no g/r  Abdomen:  Soft, nontender, +bowel sounds  Extremities:  No BLE edema      Medications:    dexamethasone  4 mg IntraVENous BID    metoprolol succinate  50 mg Oral BID    digoxin  125 mcg Oral Daily    sodium chloride flush  5-40 mL IntraVENous 2 times per day    [Held by provider] enoxaparin  1 mg/kg SubCUTAneous BID    aspirin  162 mg Oral Once    aspirin  81 mg Oral Daily      sodium chloride      dilTIAZem Stopped (03/31/22 1339)       Lab Data: Lab results independently reviewed and analyzed by myself 4/5/2022    CBC:   Recent Labs     04/03/22  0532 04/05/22  1037   WBC 6.6 9.1   HGB 14.4 13.8    221     BMP:    Recent Labs     04/03/22  0532 04/04/22  0450 04/05/22  0457   * 133* 132*   K 3.6 3.9 4.0   CO2 27 30 28   BUN 30* 27* 28*   CREATININE 0.7 0.8 0.8     INR:    No results for input(s): INR in the last 72 hours. BNP:    Recent Labs     04/03/22  0532   PROBNP 1,211*     Cardiac Enzymes:   Recent Labs     04/05/22  0457   TROPONINI 0.01     Lipids:   Lab Results   Component Value Date    TRIG 64 03/31/2022    HDL 70 03/31/2022    LDLCALC 102 03/31/2022       Cardiac Imaging:    CARDIAC CATHETERIZATION REPORT   Date of Procedure: 4/1/22  : Tonia Dowling MD   Primary Indication: Severe mitral regurgitation    Procedures Performed:  1. Coronary angiography  2. Left heart catheterization  3. Right heart catheterization   Procedural Details:  1.  Access: Local anesthetic was given and access was obtained in the right radial artery using a micropuncture technique and a 5F Terumo Slender Sheath was placed without difficulty. A 7F Terumo Slender Sheath was placed in the right brachial vein via wire exchange of an existing IV that was placed under sterile conditions. 2. Diagnostic: A 7F TD Makayla Dub catheter was used to perform the right heart catheterization. A 5F JR4 catheter and 5F JL3.5 catheter were used to perform selective right and left coronary angiography, respectively. A 5F Pigtail catheter was used to perform the left heart catheterization. No significant gradient was observed on pull-back of the catheter across the aortic valve. 3. Hemostasis: At the end of the procedure, the radial sheath was removed and a hemoband was placed over the arteriotomy site and filled with 15 cc air to maintain hemostasis. The venous sheath was moved and manual pressure applied to maintain hemostasis. Findings:  1. Hemodynamics:  A. Right heart catheterization                   1. RA:  6 mmHg                   2. RV:  34/5/7 mmHG                   3. PA:  37/18/27 mmHG                   4. PCWP: 22 mmHg                   5. Ignacio CO: 3.9 L/min                   6. Ignacio CI: 2.75  L/min*m2                   7. Ignacio SVR: 1571 d/s                   8. Transpulmonary gradient: 5       9. Ignacio PVR: 1 wood unit              B. Opening arterial pressure: 103/67/82              C. LVEDP: 15 mmhg  PA sat 71%  PW sat 95%  RA sat 70%  Ao sat 97% (on NRB at time of draw). 2.  Coronary anatomy:  A. Left main artery: The left main artery bifurcates into the left anterior descending artery and left circumflex artery. The left main artery was normal.   B. Left anterior descending artery: Transapical vessel which gives rise to 2 diagonal arteries. The left anterior descending artery had no disease. Diminutive vessel distally. The diagonal arteries had no disease.    C. Left circumflex artery: Non-dominant vessel that gives rise to 2 obtuse marginal arteries. The left circumflex artery had no disease. The first obtuse marginal artery had no diease. The second marginal artery was tortuous with no disease. D. Right coronary artery: Large Dominant vessel that gives rise to the posterior descending artery and posterolateral branch. The right coronary artery had no disease. The posterior descending artery had no disease. The posterolateral branch had no disease. Technical Factors:  Complications:  None  Estimated blood loss: <15cc  Sedation: Moderate conscious sedation was administered by qaulified nursing personnel under continuous hemodynamic monitoring, starting at 15:27 and ending at 16:46. Medications: 2 mg Versed, 25 mcg Fentanyl  Contrast: 100 cc of Isovue    Impression:  1. Normal coronaries. 2.  High-Normal left ventricular filling pressure. 3.  Normal cardiac index  4. Elevated wedge pressure - difficulty wedging (95% sat however, at time of recording). 5.  Mildly elevated pulmonary pressure/right sided filling pressure   Plan:  1. One more day of IV diuresis and consider transition to oral diuretic. 2.  Continue metoprolol, aspirin   3. CT surgery to evaluate for minimally invasive mitral valve repair. My preference would be that the patient has inpatient surgery early next week as she presented with decompensated congestive heart failure and atrial fibrillation as a result of her severe MR. This will prove difficult to manage on OP basis. She may however, seek second opinion and will need close follow up if choose to do so with follow up with our service in 1 week on discharge. Echo 3/31/2022  Summary   Irregular heart rate throughout study. Normal left ventricle systolic function with an estimated ejection fraction of 55%. No regional wall motion abnormalities are seen. Diastolic dysfunction grade and filling pressure are indeterminate due to arrhythmia. The right ventricle is normal in size and function. Gigantic left atrium. The right atrium is mildly dilated. Severely thickened mitral valve leaflets. The posterior mitral valve leaflet appears flail with likely ruptured chordae, resulting in severe eccentric, anteriorly-directed mitral regurgitation. Degree of left atrial enlargement suggests chronic MR, Cannot however, rule out super-imposed vegetation/endocarditis. Clinical correlation advised. Mild aortic valve regurgitation. Mild to moderate tricuspid valve regurgitation. Systolic pulmonary artery pressure (sPAP) is normal and estimated at 30 mmHg (right atrial pressure 8 mmHg)   There is a small right pleural effusion. No prior studies available for comparison.

## 2022-04-05 NOTE — PLAN OF CARE
Problem: FLUID AND ELECTROLYTE IMBALANCE  Goal: Fluid and electrolyte balance are achieved/maintained  Outcome: Ongoing     Patient's EF (Ejection Fraction) is greater than 40%    Heart Failure Medications:  Diuretics[de-identified] Furosemide    (One of the following REQUIRED for EF </= 40%/SYSTOLIC FAILURE but MAY be used in EF% >40%/DIASTOLIC FAILURE)        ACE[de-identified] None        ARB[de-identified] None         ARNI[de-identified] None    (Beta Blockers)  NON- Evidenced Based Beta Blocker (for EF% >40%/DIASTOLIC FAILURE): Metoprolol TARTrate- Lopressor  . .................................................................................................................................................. Patient's weights and intake/output reviewed: Yes    Patient's Last Weight:  4/5/22 patient's weight is 95lb 1.6 oz, less than previous documented. Intake/Output Summary (Last 24 hours) at 4/5/2022 1210  Last data filed at 4/5/2022 2491  Gross per 24 hour   Intake 360 ml   Output 1600 ml   Net -1240 ml       Comorbidities Reviewed Yes    Patient has a past medical history of Varicose vein. >>For CHF and Comorbidity documentation on Education Time and Topics, please see Education Tab    Progressive Mobility Assessment:  What is this patient's Current Level of Mobility?: Ambulatory-Up Ad Anisha  How was this patient Mobilized today?: Up as tolerated without assist                   Level of Difficulty/Assistance: Independent     Pt resting in bed at this time on RA. Pt denies shortness of breath. Pt without lower extremity edema.      Patient and/or Family's stated Goal of Care this Admission: reduce shortness of breath, increase activity tolerance, better understand heart failure and disease management, be more comfortable and reduce lower extremity edema prior to discharge

## 2022-04-05 NOTE — PROGRESS NOTES
Paged hosptialist:    Patient's neck is hurting. She is a CABG for Wednesday. She is very emotional. Did not sleep well and is asking for something for pain. Tylenol is all she has on her MAR and she said that will not help.

## 2022-04-05 NOTE — PROGRESS NOTES
Hospitalist Progress Note      PCP: Marysol Webb MD    Date of Admission: 3/30/2022    Chief Complaint: [facility] Course: Yordy Joseph is a 75 y.o. female who presented to the ED to be evaluated for a several year history of heart palpitations, which became associated with weakness and dyspnea 3 months PTA. The patient reports that she has not been seen by any type of physician for greater than 2 decades. She is scheduled a new PCP appointment for earlier today, but when she arrived she was tachycardic into the 150s therefore EMS was contacted to transport her to the ED. Upon further questioning, patient endorses intermittent chest pain for which she takes a baby aspirin PRN. She states that she has noted increased THOMPSON as well as BLE edema ongoing since the beginning of 2022.     Upon arrival to the ED EKG was obtained revealing A. fib RVR with ventricular rate 141; PVCs, anterior infarct of unknown duration, L APB, and QTC prolongation also appreciated. CXR notable for bilateral pleural effusions as well as bibasilar opacities. Admitting hospitalist physician has ordered chest CT with PE protocol to further assess. Labs were essentially unremarkable excluding BNP 3634. Patient received IV Cardizem bolus and drip initiation per ED provider. She will be admitted for new onset A. fib with suspected decompensated CHF. Subjective: Resting, NAD. Updated on plan of care. VSS, CT surg consulted, plan for MV repair on Wednesday 4/6.       Medications:  Reviewed    Infusion Medications    sodium chloride      dilTIAZem Stopped (03/31/22 1339)     Scheduled Medications    dexamethasone  4 mg IntraVENous BID    metoprolol succinate  50 mg Oral BID    digoxin  125 mcg Oral Daily    sodium chloride flush  5-40 mL IntraVENous 2 times per day    enoxaparin  1 mg/kg SubCUTAneous BID    aspirin  162 mg Oral Once    aspirin  81 mg Oral Daily    [Held by provider] apixaban  5 mg Oral BID     PRN Meds: morphine, sodium chloride flush, sodium chloride, acetaminophen, butalbital-acetaminophen-caffeine, potassium chloride **OR** potassium alternative oral replacement **OR** potassium chloride, magnesium sulfate, senna, acetaminophen **OR** acetaminophen, perflutren lipid microspheres, ondansetron **OR** ondansetron, nitroGLYCERIN      Intake/Output Summary (Last 24 hours) at 4/5/2022 1004  Last data filed at 4/5/2022 0826  Gross per 24 hour   Intake 470 ml   Output 1900 ml   Net -1430 ml       Physical Exam Performed:    /77   Pulse 101   Temp 99 °F (37.2 °C) (Oral)   Resp 18   Ht 5' (1.524 m)   Wt 95 lb 1.6 oz (43.1 kg)   SpO2 92%   BMI 18.57 kg/m²     General appearance: No apparent distress, appears stated age and cooperative. HEENT: Pupils equal, round, and reactive to light. Conjunctivae/corneas clear. Neck: Supple, with full range of motion. No jugular venous distention. Trachea midline. Respiratory:  Normal respiratory effort. Clear to auscultation, bilaterally without Rales/Wheezes/Rhonchi. Cardiovascular: Irregular rate and rhythm with BAY  Abdomen: Soft, non-tender, non-distended with normal bowel sounds. Musculoskeletal: No clubbing, cyanosis or edema bilaterally. Full range of motion without deformity. Skin: Skin color, texture, turgor normal.  No rashes or lesions. Neurologic:  Neurovascularly intact without any focal sensory/motor deficits.  Cranial nerves: II-XII intact, grossly non-focal.  Psychiatric: Alert and oriented, thought content appropriate, normal insight  Capillary Refill: Brisk,3 seconds, normal   Peripheral Pulses: +2 palpable, equal bilaterally       Labs:   Recent Labs     04/03/22  0532   WBC 6.6   HGB 14.4   HCT 43.9        Recent Labs     04/03/22  0532 04/04/22  0450 04/05/22  0457   * 133* 132*   K 3.6 3.9 4.0   CL 93* 92* 91*   CO2 27 30 28   BUN 30* 27* 28*   CREATININE 0.7 0.8 0.8   CALCIUM 9.3 9.0 9.1     No results for input(s): AST, ALT, BILIDIR, BILITOT, ALKPHOS in the last 72 hours. No results for input(s): INR in the last 72 hours. Recent Labs     04/05/22  0457   TROPONINI 0.01       Urinalysis:      Lab Results   Component Value Date    NITRU Negative 03/30/2022    BLOODU Negative 03/30/2022    SPECGRAV 1.010 03/30/2022    GLUCOSEU Negative 03/30/2022       Radiology:  VL DUP CAROTID BILATERAL   Final Result      CT CHEST PULMONARY EMBOLISM W CONTRAST   Final Result   No evidence of pulmonary embolism      Cardiomegaly, including severe left atrial enlargement. Small bilateral pleural effusions, right greater than left, with associated   atelectasis. Mild smooth interlobular septal thickening, favored to represent mild   interstitial pulmonary edema.          XR CHEST PORTABLE   Final Result   Bilateral pleural effusions right greater than left with bibasilar opacities   that could represent atelectasis or pneumonia         XR CHEST PORTABLE    (Results Pending)           Assessment/Plan:    Active Hospital Problems    Diagnosis     Mitral valve insufficiency [I34.0]     Atrial fibrillation with RVR (HCC) [I48.91]     New onset atrial fibrillation (HCC) [I48.91]     Decompensated heart failure (HCC) [I50.9]     Pleural effusion, bilateral [J90]     LAFB (left anterior fascicular block) [I44.4]     Cardiomegaly [V30.6]     Pansystolic murmur [B90.0]      New onset A. fib with RVR  -Tele  -Patient initiated on IV Cardizem bolus and drip, the latter of which will be continued overnight  -Electrolytes, thyroid studies to assess for secondary causes of this tachyarrhythmia  -Patient with CHADS2 score of 3, Eliquis initiated-however d/c'd this morning after Echo findings-start lovenox tx dose  -Echo: EF 55%, severe MR, mild AR, mild to mod TR  -Cardiology consulted  -LHC: nl coronaries, RHC High-nl left vent filling pressure, elevated wedge pressure, mil elevated pulm pressure  -CT surg consulted plan for MV repair with MAZE and ISAAC clip 4/6     Chest pain   -ASA daily, BB  -FLP , HDL 70, Cholesterol 185; patient declined statin this evening  -Nitropaste applied to chest wall (BP permitting)  -Nuclear stress test not ordered at this time due to A. fib RVR presentation  -Serial troponin negative     Pulmonary edema  -Suspect decompensated CHF, although patient with no previous cardiac history or echo available for review  -Chest CT with PE protocol ordered revealing massive cardiomegaly with severe LAE enlargement  -Admit to floor for continuous telemetry monitoring and strict I's & O's  -Lasix changed to PO, wt down 2.6kg  -2G Na and fluid restriction dietary modifications in place    DVT Prophylaxis: Lovenox, scds  Diet: ADULT DIET;  Regular; Low Fat/Low Chol/High Fiber/2 gm Na  Code Status: Full Code    PT/OT Eval Status: ambulatory    Dispo - pending course, several days    Camille Manning, APRN - CNP

## 2022-04-05 NOTE — PROGRESS NOTES
CVTS Thoracic Progress Note:          CC:  Fatigue, shortness of breath, palpitations     Subj: Reports cough, not new. Otherwise doing ok. Obj:    Blood pressure 121/77, pulse 101, temperature 99 °F (37.2 °C), temperature source Oral, resp. rate 18, height 5' (1.524 m), weight 95 lb 1.6 oz (43.1 kg), SpO2 92 %. Alert, oriented    S1 S2 normal. +Murmur. Irregularly irregular. Atrial fibrillation on monitor with HR in the low 100s. Lungs with mild expiratory wheezes on the right    Abdomen soft, non-tender    Trace lower extremity edema     Diagnostics:   Recent Labs     04/03/22  0532 04/05/22  1037   WBC 6.6 9.1   HGB 14.4 13.8   HCT 43.9 41.5    221                                                                  Recent Labs     04/03/22  0532 04/04/22  0450 04/05/22  0457   * 133* 132*   K 3.6 3.9 4.0   CL 93* 92* 91*   CO2 27 30 28   BUN 30* 27* 28*   CREATININE 0.7 0.8 0.8   GLUCOSE 99 118* 98            Recent Labs     04/03/22  0532 04/04/22  0450 04/05/22  0457   MG 2.10 1.90 2.00      Recent Labs     04/05/22  0457   TROPONINI 0.01     CT chest: 3/30/22  Impression   No evidence of pulmonary embolism       Cardiomegaly, including severe left atrial enlargement.       Small bilateral pleural effusions, right greater than left, with associated   atelectasis.       Mild smooth interlobular septal thickening, favored to represent mild   interstitial pulmonary edema. Carotid duplex: 4/4/22  Summary        The bilateral proximal internal carotid artery reveals a <50% diameter    reducing stenosis.    The bilateral vertebral arteries demonstrate normal antegrade flow. XAVIER: 4/1/22  Summary   The left ventricle appears mildly dilated with preserved systolic function. Ejection fraction is visually estimated at 55%. Normal right ventricular size and function. Gigantic left atrium. No left atrial/left atrial appendage thrombus.    Flail posterior mitral valve leaflet with ruptured chordae seen prolapsing   into Left atrium. Severe anterior-directed, eccentric mitral regurgitation. Trace aortic regurgitation. Mild tricuspid regurgitation. Mild pulmonic regurgitation present. A bubble study was performed and showed delayed evidence of right to left   shunt consistent with a small intrapulmonary shunt. CXR: 4/5/22  FINDINGS:   The cardiac silhouette remains prominent. Suann Pillar is a moderate-sized right   pleural effusion, extending to nearly the abimael. Suann Pillar is also patchy   opacity in the right perihilar region.  Left lung is clear. Suann Pillar is no   pneumothorax.           Impression   Moderate-sized right pleural effusion, similar to CT chest 03/30/2022.       Right perihilar atelectasis, versus pneumonia. Assess/Plan:   Labs and imaging reviewed as above. IM and Cardiology notes reviewed.      Severe mitral valve regurgitation   -Severe, eccentric MV regurgitation with flail PMVL per XAVIER 4/1  -Normal coronaries per LHC 4/1  -Plan for minimal access MVr tomorrow (4/6)     Atrial fibrillation, new onset    -on dig, BB  -Last dose of  Eliquis on 3/30  -Hold therapeutic lovenox after AM dose today in anticipate of OR tomorrow   -Will plan for MAZE and ISAAC clip tomorrow in addition to MVr    CHF  -Diuresis per Cardiology   -Weight down almost 6 kg from admission     Pulmonary edema with pleural effusions  -As seen on CT chest 3/30  -Still with frequent cough  -Will give 24 hours of decadron   -Repeat CXR   ________________________________________________________________    [place] Loges, CHARLES - CNP  4/5/2022  11:53 AM

## 2022-04-05 NOTE — PROGRESS NOTES
Patient's EF (Ejection Fraction) is greater than 40%    Heart Failure Medications:  Diuretics[de-identified] Furosemide     (One of the following REQUIRED for EF </= 40%/SYSTOLIC FAILURE but MAY be used in EF% >40%/DIASTOLIC FAILURE)        ACE[de-identified] None        ARB[de-identified] None         ARNI[de-identified] None    (Beta Blockers)   NON- Evidenced Based Beta Blocker (for EF% >40%/DIASTOLIC FAILURE): Metoprolol TARTrate- Lopressor  . .................................................................................................................................................. Patient's weights and intake/output reviewed: Yes    Patient's Last Weight:  4/5/22 patient's weight is 95lb 1.6 oz, less than previous documented. Intake/Output Summary (Last 24 hours) at 4/5/2022 1210  Last data filed at 4/5/2022 1523  Gross per 24 hour   Intake 360 ml   Output 1600 ml   Net -1240 ml       Comorbidities Reviewed Yes    Patient has a past medical history of Varicose vein. >>For CHF and Comorbidity documentation on Education Time and Topics, please see Education Tab    Progressive Mobility Assessment:  What is this patient's Current Level of Mobility?: Ambulatory-Up Ad Anisha  How was this patient Mobilized today?: Up as tolerated without assist                   Level of Difficulty/Assistance: Independent     Pt resting in bed at this time on RA. Pt denies shortness of breath. Pt without lower extremity edema.      Patient and/or Family's stated Goal of Care this Admission: reduce shortness of breath, increase activity tolerance, better understand heart failure and disease management, be more comfortable and reduce lower extremity edema prior to discharge        :

## 2022-04-05 NOTE — ED PROVIDER NOTES
I personally saw Michaela Minor and performed a substantive portion of the visit including all aspects of the medical decision making. .    In brief, this is a 59-year-old female presenting for evaluation of shortness of breath. The patient states that she has been feeling short of breath over the past several months, she states she is checked her heart rate at home and it is around the 140s to 150s. She states she has not seen a doctor in years. She finally made an appointment, with her primary care office today and was noted to be tachycardic and was sent here for further evaluation. She does describe shortness of breath, chronic cough, as well as some intermittent chest pain. She denies pain here in the ED. On exam, she is chronically ill-appearing. She has a tachycardic rate and irregularly irregular rhythm. She has a harsh diastolic murmur present on exam.  She has crackles in posterior lower lobes. She has 2+ peripheral edema present. The Ekg interpreted by me shows  atrial fibrillation RVR with a rate of 141  Axis is   Right axis deviation  QTc is  within an acceptable range  Intervals and Durations are unremarkable. ST Segments: nonspecific changes no acute ischemia   No prior ekg for comparison       ED course: The patient is a 59-year-old female presenting with palpitations she is in A. fib RVR sent from primary care office. Initial heart rate is 128, she is 96% on room air and hypertensive. She is initiated on diltiazem bolus and drip. EKG is nonischemic. BNP is elevated I do suspect a CHF exacerbation likely from longstanding A. fib RVR. Heart rate did improve to low 100s throughout ED stay. The patient will require admission for further treatment. All diagnostic, treatment, and disposition decisions were made by myself in conjunction with the advanced practice provider.     For all further details of the patient's emergency department visit, please see the advanced practice provider's documentation. Comment: Please note this report has been produced using speech recognition software and may contain errors related to that system including errors in grammar, punctuation, and spelling, as well as words and phrases that may be inappropriate. If there are any questions or concerns please feel free to contact the dictating provider for clarification.          Erlinda [place]  04/05/22 5455

## 2022-04-05 NOTE — ANESTHESIA PRE PROCEDURE
Department of Anesthesiology  Preprocedure Note       Name:  Issac Severin   Age:  75 y.o.  :  1955                                          MRN:  [id number]         Date:  2022      Surgeon: Lazara Partida):  Joni Quinones MD    Procedure: Procedure(s): MINIMAL ACCESS MITRAL VALVE REPAIR OR REPLACEMENT, MAZE PROCEDURE, LEFT ATERIAL APPENDAGE CLIP PLACEMENT    Medications prior to admission:   Prior to Admission medications    Medication Sig Start Date End Date Taking? Authorizing Provider   aspirin 300 MG suppository Place 300 mg rectally every 6 hours as needed.     Historical Provider, MD       Current medications:    Current Facility-Administered Medications   Medication Dose Route Frequency Provider Last Rate Last Admin    morphine (PF) injection 2 mg  2 mg IntraVENous Q4H PRN Santos Awan MD        dexamethasone (DECADRON) injection 4 mg  4 mg IntraVENous BID CHARLES Johnson CNP   4 mg at 22 2224    furosemide (LASIX) tablet 20 mg  20 mg Oral Once CHARLES Marques CNP        metoprolol succinate (TOPROL XL) extended release tablet 50 mg  50 mg Oral BID CHARLES Marques CNP   50 mg at 22 0816    digoxin (LANOXIN) tablet 125 mcg  125 mcg Oral Daily CHARLES Marques CNP   125 mcg at 22 5174    sodium chloride flush 0.9 % injection 5-40 mL  5-40 mL IntraVENous 2 times per day Alan Keys MD   10 mL at 22 0817    sodium chloride flush 0.9 % injection 5-40 mL  5-40 mL IntraVENous PRN Alan Keys MD        0.9 % sodium chloride infusion  25 mL IntraVENous PRN Alan Keys MD        acetaminophen (TYLENOL) tablet 650 mg  650 mg Oral Q4H PRN Alan Keys MD        [Held by provider] enoxaparin (LOVENOX) injection 50 mg  1 mg/kg SubCUTAneous BID Alan Keys MD   50 mg at 22 0816    butalbital-acetaminophen-caffeine (FIORICET, ESGIC) per tablet 1 tablet  1 tablet Oral Q4H PRN CHARLES Villa CNP   1 tablet at 22 1244    dilTIAZem 125 mg in dextrose 5 % 125 mL infusion  2.5-15 mg/hr IntraVENous Continuous Renu Mabry MD   Stopped at 03/31/22 1339    potassium chloride (KLOR-CON M) extended release tablet 40 mEq  40 mEq Oral PRN Renu Mabry MD        Or    potassium bicarb-citric acid (EFFER-K) effervescent tablet 40 mEq  40 mEq Oral PRN Renu Mabry MD        Or   Babtaunde Loser potassium chloride 10 mEq/100 mL IVPB (Peripheral Line)  10 mEq IntraVENous PRN Renu Mabry MD        magnesium sulfate 2000 mg in 50 mL IVPB premix  2,000 mg IntraVENous PRN Renu Mabry MD        senna (SENOKOT) tablet 8.6 mg  1 tablet Oral Daily PRN Renu Mabry MD        acetaminophen (TYLENOL) tablet 650 mg  650 mg Oral Q6H PRN Renu Mabry MD   650 mg at 03/31/22 0410    Or    acetaminophen (TYLENOL) suppository 650 mg  650 mg Rectal Q6H PRN Renu Mabry MD        perflutren lipid microspheres (DEFINITY) injection 1.65 mg  1.5 mL IntraVENous ONCE PRN Renu Mabry MD        ondansetron (ZOFRAN-ODT) disintegrating tablet 4 mg  4 mg Oral Q8H PRN Renu Mabry MD        Or    [facility]etron [facility]) injection 4 mg  4 mg IntraVENous Q6H PRN Renu Mabry MD        aspirin chewable tablet 162 mg  162 mg Oral Once Renu Mabry MD        aspirin EC tablet 81 mg  81 mg Oral Daily Renu Mabry MD   81 mg at 04/05/22 0816    nitroGLYCERIN (NITROSTAT) SL tablet 0.4 mg  0.4 mg SubLINGual Q5 Min PRN Renu Mabry MD   0.4 mg at 04/01/22 1056       Allergies:     Allergies   Allergen Reactions    Dairycare [Lactase-Lactobacillus]     Eggs Or Egg-Derived Products     Lactose Intolerance (Gi)        Problem List:    Patient Active Problem List   Diagnosis Code    Atrial fibrillation with RVR (HCC) I48.91    New onset atrial fibrillation (HCC) I48.91    Decompensated heart failure (HCC) I50.9    Pleural effusion, bilateral J90    LAFB (left anterior fascicular block) I44.4    Cardiomegaly I51.7    Pansystolic murmur X12.4    Mitral valve insufficiency I34.0       Past Medical History:        Diagnosis Date    Varicose vein        Past Surgical History:        Procedure Laterality Date    TONSILLECTOMY         Social History:    Social History     Tobacco Use    Smoking status: Never Smoker    Smokeless tobacco: Not on file   Substance Use Topics    Alcohol use: No                                Counseling given: Not Answered      Vital Signs (Current):   Vitals:    04/04/22 2118 04/04/22 2331 04/05/22 0610 04/05/22 0814   BP: 104/74 117/77 116/69 121/77   Pulse: 86 102 81 101   Resp: 16 17 17 18   Temp: 98.4 °F (36.9 °C) 99.1 °F (37.3 °C) 98.8 °F (37.1 °C) 99 °F (37.2 °C)   TempSrc: Oral Oral Oral Oral   SpO2: 93% 92% 93% 92%   Weight:   95 lb 1.6 oz (43.1 kg)    Height:                                                  BP Readings from Last 3 Encounters:   04/05/22 121/77   04/01/22 (!) 88/59   08/17/10 98/56       NPO Status:                                                                                 BMI:   Wt Readings from Last 3 Encounters:   04/05/22 95 lb 1.6 oz (43.1 kg)   08/17/10 105 lb (47.6 kg)     Body mass index is 18.57 kg/m². CBC:   Lab Results   Component Value Date    WBC 9.1 04/05/2022    RBC 4.38 04/05/2022    HGB 13.8 04/05/2022    HCT 41.5 04/05/2022    MCV 94.7 04/05/2022    RDW 13.9 04/05/2022     04/05/2022       CMP:   Lab Results   Component Value Date     04/05/2022    K 4.0 04/05/2022    K 3.9 03/31/2022    CL 91 04/05/2022    CO2 28 04/05/2022    BUN 28 04/05/2022    CREATININE 0.8 04/05/2022    GFRAA >60 04/05/2022    AGRATIO 1.5 03/31/2022    LABGLOM >60 04/05/2022    GLUCOSE 98 04/05/2022    PROT 7.5 03/31/2022    CALCIUM 9.1 04/05/2022    BILITOT 0.8 03/31/2022    ALKPHOS 83 03/31/2022    AST 29 03/31/2022    ALT 18 03/31/2022       POC Tests: No results for input(s): POCGLU, POCNA, POCK, POCCL, POCBUN, POCHEMO, POCHCT in the last 72 hours.     Coags: Lab Results   Component Value Date    PROTIME 14.7 03/30/2022    INR 1.29 03/30/2022       HCG (If Applicable): No results found for: PREGTESTUR, PREGSERUM, HCG, HCGQUANT     ABGs: No results found for: PHART, PO2ART, TWN7LGD, QKT4HOT, BEART, M3CDCQJF     Type & Screen (If Applicable):  No results found for: LABABO, LABRH    Drug/Infectious Status (If Applicable):  No results found for: HIV, HEPCAB    COVID-19 Screening (If Applicable): No results found for: COVID19        Anesthesia Evaluation    Airway: Mallampati: II  TM distance: <3 FB   Neck ROM: limited  Mouth opening: > = 3 FB Dental:          Pulmonary:                              Cardiovascular:    (+) valvular problems/murmurs: MR, CHF:,                   Neuro/Psych:               GI/Hepatic/Renal:             Endo/Other:                     Abdominal:             Vascular: Other Findings:           Anesthesia Plan      general     ASA 3     (  [address] EVALUATION FORM       Name:  Felipa Holden                                         Age:  75 y.o. MRN:  [id number]           I discussed intravenous with inhalational endotracheal anesthesia with pulmonary artery catheter, radial ( or other artery if required) catheter, possible transesophageal echocardiography and post-operative ventilation including risks and alternatives with the patient . The patient has no further questions. Akhil Lira MD  April 5, 2022  11:48 AM)  Induction: inhalational and intravenous. arterial line, central line, continuous noninvasive hemodynamic monitor, BIS, CVP and XAVIER    Anesthetic plan and risks discussed with patient.                     Akhil Lira MD   4/5/2022

## 2022-04-06 ENCOUNTER — APPOINTMENT (OUTPATIENT)
Dept: GENERAL RADIOLOGY | Age: 67
DRG: 216 | End: 2022-04-06
Payer: MEDICARE

## 2022-04-06 ENCOUNTER — ANESTHESIA (OUTPATIENT)
Dept: OPERATING ROOM | Age: 67
DRG: 216 | End: 2022-04-06
Payer: MEDICARE

## 2022-04-06 VITALS — TEMPERATURE: 97.4 F | OXYGEN SATURATION: 100 % | SYSTOLIC BLOOD PRESSURE: 85 MMHG | DIASTOLIC BLOOD PRESSURE: 60 MMHG

## 2022-04-06 LAB
ACTIVATED CLOTTING TIME: 112 SEC (ref 99–130)
ACTIVATED CLOTTING TIME: 126 SEC (ref 99–130)
ACTIVATED CLOTTING TIME: 126 SEC (ref 99–130)
ACTIVATED CLOTTING TIME: 174 SEC (ref 99–130)
ACTIVATED CLOTTING TIME: 423 SEC (ref 99–130)
ACTIVATED CLOTTING TIME: 462 SEC (ref 99–130)
ACTIVATED CLOTTING TIME: 463 SEC (ref 99–130)
ACTIVATED CLOTTING TIME: 564 SEC (ref 99–130)
ALBUMIN SERPL-MCNC: 4.2 G/DL (ref 3.4–5)
ALP BLD-CCNC: 81 U/L (ref 40–129)
ALT SERPL-CCNC: 22 U/L (ref 10–40)
ANION GAP SERPL CALCULATED.3IONS-SCNC: 9 MMOL/L (ref 3–16)
ANION GAP SERPL CALCULATED.3IONS-SCNC: 9 MMOL/L (ref 3–16)
AST SERPL-CCNC: 27 U/L (ref 15–37)
BASE EXCESS ARTERIAL: 2 (ref -3–3)
BASE EXCESS ARTERIAL: 3 (ref -3–3)
BASE EXCESS ARTERIAL: 5 (ref -3–3)
BASE EXCESS ARTERIAL: 7 (ref -3–3)
BASE EXCESS ARTERIAL: 8 (ref -3–3)
BASOPHILS ABSOLUTE: 0 K/UL (ref 0–0.2)
BASOPHILS RELATIVE PERCENT: 0.1 %
BILIRUB SERPL-MCNC: 0.7 MG/DL (ref 0–1)
BILIRUBIN DIRECT: <0.2 MG/DL (ref 0–0.3)
BILIRUBIN, INDIRECT: NORMAL MG/DL (ref 0–1)
BUN BLDV-MCNC: 22 MG/DL (ref 7–20)
BUN BLDV-MCNC: 29 MG/DL (ref 7–20)
CALCIUM IONIZED: 1 MMOL/L (ref 1.12–1.32)
CALCIUM IONIZED: 1.03 MMOL/L (ref 1.12–1.32)
CALCIUM IONIZED: 1.06 MMOL/L (ref 1.12–1.32)
CALCIUM IONIZED: 1.08 MMOL/L (ref 1.12–1.32)
CALCIUM IONIZED: 1.21 MMOL/L (ref 1.12–1.32)
CALCIUM IONIZED: 1.32 MMOL/L (ref 1.12–1.32)
CALCIUM IONIZED: 1.47 MMOL/L (ref 1.12–1.32)
CALCIUM IONIZED: 1.49 MMOL/L (ref 1.12–1.32)
CALCIUM IONIZED: 1.51 MMOL/L (ref 1.12–1.32)
CALCIUM IONIZED: 1.67 MMOL/L (ref 1.12–1.32)
CALCIUM SERPL-MCNC: 10.3 MG/DL (ref 8.3–10.6)
CALCIUM SERPL-MCNC: 9.5 MG/DL (ref 8.3–10.6)
CHLORIDE BLD-SCNC: 93 MMOL/L (ref 99–110)
CHLORIDE BLD-SCNC: 99 MMOL/L (ref 99–110)
CO2: 28 MMOL/L (ref 21–32)
CO2: 29 MMOL/L (ref 21–32)
CREAT SERPL-MCNC: 0.7 MG/DL (ref 0.6–1.2)
CREAT SERPL-MCNC: 0.7 MG/DL (ref 0.6–1.2)
EOSINOPHILS ABSOLUTE: 0 K/UL (ref 0–0.6)
EOSINOPHILS RELATIVE PERCENT: 0 %
ESTIMATED AVERAGE GLUCOSE: 114 MG/DL
GFR AFRICAN AMERICAN: >60
GFR AFRICAN AMERICAN: >60
GFR NON-AFRICAN AMERICAN: >60
GFR NON-AFRICAN AMERICAN: >60
GLUCOSE BLD-MCNC: 101 MG/DL (ref 70–99)
GLUCOSE BLD-MCNC: 108 MG/DL (ref 70–99)
GLUCOSE BLD-MCNC: 113 MG/DL (ref 70–99)
GLUCOSE BLD-MCNC: 127 MG/DL (ref 70–99)
GLUCOSE BLD-MCNC: 136 MG/DL (ref 70–99)
GLUCOSE BLD-MCNC: 141 MG/DL (ref 70–99)
GLUCOSE BLD-MCNC: 157 MG/DL (ref 70–99)
GLUCOSE BLD-MCNC: 168 MG/DL (ref 70–99)
GLUCOSE BLD-MCNC: 183 MG/DL (ref 70–99)
GLUCOSE BLD-MCNC: 192 MG/DL (ref 70–99)
GLUCOSE BLD-MCNC: 203 MG/DL (ref 70–99)
GLUCOSE BLD-MCNC: 205 MG/DL (ref 70–99)
GLUCOSE BLD-MCNC: 215 MG/DL (ref 70–99)
GLUCOSE BLD-MCNC: 237 MG/DL (ref 70–99)
GLUCOSE BLD-MCNC: 79 MG/DL (ref 70–99)
GLUCOSE BLD-MCNC: 80 MG/DL (ref 70–99)
GLUCOSE BLD-MCNC: 83 MG/DL (ref 70–99)
GLUCOSE BLD-MCNC: 96 MG/DL (ref 70–99)
HBA1C MFR BLD: 5.6 %
HCO3 ARTERIAL: 26.3 MMOL/L (ref 21–29)
HCO3 ARTERIAL: 26.7 MMOL/L (ref 21–29)
HCO3 ARTERIAL: 26.7 MMOL/L (ref 21–29)
HCO3 ARTERIAL: 26.8 MMOL/L (ref 21–29)
HCO3 ARTERIAL: 27.1 MMOL/L (ref 21–29)
HCO3 ARTERIAL: 28.1 MMOL/L (ref 21–29)
HCO3 ARTERIAL: 28.5 MMOL/L (ref 21–29)
HCO3 ARTERIAL: 31.1 MMOL/L (ref 21–29)
HCO3 ARTERIAL: 32.2 MMOL/L (ref 21–29)
HCO3 ARTERIAL: 33 MMOL/L (ref 21–29)
HCT VFR BLD CALC: 39.6 % (ref 36–48)
HCT VFR BLD CALC: 41.2 % (ref 36–48)
HEMOGLOBIN: 11.1 GM/DL (ref 12–16)
HEMOGLOBIN: 11.1 GM/DL (ref 12–16)
HEMOGLOBIN: 11.3 GM/DL (ref 12–16)
HEMOGLOBIN: 12.6 GM/DL (ref 12–16)
HEMOGLOBIN: 13 G/DL (ref 12–16)
HEMOGLOBIN: 13 GM/DL (ref 12–16)
HEMOGLOBIN: 13.1 GM/DL (ref 12–16)
HEMOGLOBIN: 13.9 G/DL (ref 12–16)
HEMOGLOBIN: 8.3 GM/DL (ref 12–16)
HEMOGLOBIN: 8.5 GM/DL (ref 12–16)
HEMOGLOBIN: 8.8 GM/DL (ref 12–16)
HEMOGLOBIN: 8.9 GM/DL (ref 12–16)
HEMOGLOBIN: 9.2 GM/DL (ref 12–16)
INR BLD: 1.22 (ref 0.88–1.12)
LACTATE: 0.87 MMOL/L (ref 0.4–2)
LACTATE: 1.86 MMOL/L (ref 0.4–2)
LACTATE: 2.11 MMOL/L (ref 0.4–2)
LACTATE: 2.16 MMOL/L (ref 0.4–2)
LYMPHOCYTES ABSOLUTE: 0.5 K/UL (ref 1–5.1)
LYMPHOCYTES RELATIVE PERCENT: 7.1 %
MAGNESIUM: 2.1 MG/DL (ref 1.8–2.4)
MAGNESIUM: 3 MG/DL (ref 1.8–2.4)
MCH RBC QN AUTO: 31.1 PG (ref 26–34)
MCH RBC QN AUTO: 32.1 PG (ref 26–34)
MCHC RBC AUTO-ENTMCNC: 32.9 G/DL (ref 31–36)
MCHC RBC AUTO-ENTMCNC: 33.7 G/DL (ref 31–36)
MCV RBC AUTO: 94.5 FL (ref 80–100)
MCV RBC AUTO: 95.1 FL (ref 80–100)
MONOCYTES ABSOLUTE: 0.4 K/UL (ref 0–1.3)
MONOCYTES RELATIVE PERCENT: 5.1 %
NEUTROPHILS ABSOLUTE: 6.1 K/UL (ref 1.7–7.7)
NEUTROPHILS RELATIVE PERCENT: 87.7 %
O2 SAT, ARTERIAL: 100 % (ref 93–100)
PCO2 ARTERIAL: 39.7 MM HG (ref 35–45)
PCO2 ARTERIAL: 40.6 MM HG (ref 35–45)
PCO2 ARTERIAL: 42.8 MM HG (ref 35–45)
PCO2 ARTERIAL: 42.8 MM HG (ref 35–45)
PCO2 ARTERIAL: 45.2 MM HG (ref 35–45)
PCO2 ARTERIAL: 45.2 MM HG (ref 35–45)
PCO2 ARTERIAL: 50.5 MM HG (ref 35–45)
PCO2 ARTERIAL: 51.9 MM HG (ref 35–45)
PCO2 ARTERIAL: 54.8 MM HG (ref 35–45)
PCO2 ARTERIAL: 57.6 MM HG (ref 35–45)
PDW BLD-RTO: 13.5 % (ref 12.4–15.4)
PDW BLD-RTO: 13.7 % (ref 12.4–15.4)
PERFORMED ON: ABNORMAL
PH ARTERIAL: 7.34 (ref 7.35–7.45)
PH ARTERIAL: 7.36 (ref 7.35–7.45)
PH ARTERIAL: 7.38 (ref 7.35–7.45)
PH ARTERIAL: 7.39 (ref 7.35–7.45)
PH ARTERIAL: 7.4 (ref 7.35–7.45)
PH ARTERIAL: 7.4 (ref 7.35–7.45)
PH ARTERIAL: 7.41 (ref 7.35–7.45)
PH ARTERIAL: 7.41 (ref 7.35–7.45)
PH ARTERIAL: 7.42 (ref 7.35–7.45)
PH ARTERIAL: 7.44 (ref 7.35–7.45)
PLATELET # BLD: 114 K/UL (ref 135–450)
PLATELET # BLD: 207 K/UL (ref 135–450)
PMV BLD AUTO: 7.5 FL (ref 5–10.5)
PMV BLD AUTO: 7.8 FL (ref 5–10.5)
PO2 ARTERIAL: 193.8 MM HG (ref 75–108)
PO2 ARTERIAL: 198.8 MM HG (ref 75–108)
PO2 ARTERIAL: 337.2 MM HG (ref 75–108)
PO2 ARTERIAL: 363.5 MM HG (ref 75–108)
PO2 ARTERIAL: 374.5 MM HG (ref 75–108)
PO2 ARTERIAL: 419.7 MM HG (ref 75–108)
PO2 ARTERIAL: 464 MM HG (ref 75–108)
PO2 ARTERIAL: 474.4 MM HG (ref 75–108)
PO2 ARTERIAL: 508.8 MM HG (ref 75–108)
PO2 ARTERIAL: 526.4 MM HG (ref 75–108)
POC HEMATOCRIT: 24 % (ref 36–48)
POC HEMATOCRIT: 25 % (ref 36–48)
POC HEMATOCRIT: 26 % (ref 36–48)
POC HEMATOCRIT: 26 % (ref 36–48)
POC HEMATOCRIT: 27 % (ref 36–48)
POC HEMATOCRIT: 33 % (ref 36–48)
POC HEMATOCRIT: 37 % (ref 36–48)
POC HEMATOCRIT: 38 % (ref 36–48)
POC HEMATOCRIT: 38 % (ref 36–48)
POC POTASSIUM: 3.5 MMOL/L (ref 3.5–5.1)
POC POTASSIUM: 3.7 MMOL/L (ref 3.5–5.1)
POC POTASSIUM: 3.9 MMOL/L (ref 3.5–5.1)
POC POTASSIUM: 4 MMOL/L (ref 3.5–5.1)
POC POTASSIUM: 4.3 MMOL/L (ref 3.5–5.1)
POC POTASSIUM: 4.4 MMOL/L (ref 3.5–5.1)
POC POTASSIUM: 4.6 MMOL/L (ref 3.5–5.1)
POC POTASSIUM: 4.6 MMOL/L (ref 3.5–5.1)
POC POTASSIUM: 5 MMOL/L (ref 3.5–5.1)
POC POTASSIUM: 5 MMOL/L (ref 3.5–5.1)
POC SAMPLE TYPE: ABNORMAL
POC SODIUM: 127 MMOL/L (ref 136–145)
POC SODIUM: 127 MMOL/L (ref 136–145)
POC SODIUM: 129 MMOL/L (ref 136–145)
POC SODIUM: 129 MMOL/L (ref 136–145)
POC SODIUM: 130 MMOL/L (ref 136–145)
POC SODIUM: 133 MMOL/L (ref 136–145)
POC SODIUM: 135 MMOL/L (ref 136–145)
POC SODIUM: 135 MMOL/L (ref 136–145)
POC SODIUM: 138 MMOL/L (ref 136–145)
POC SODIUM: 140 MMOL/L (ref 136–145)
POTASSIUM SERPL-SCNC: 4.5 MMOL/L (ref 3.5–5.1)
PRO-BNP: 2973 PG/ML (ref 0–124)
PROTHROMBIN TIME: 13.9 SEC (ref 9.9–12.7)
RBC # BLD: 4.19 M/UL (ref 4–5.2)
RBC # BLD: 4.34 M/UL (ref 4–5.2)
SODIUM BLD-SCNC: 130 MMOL/L (ref 136–145)
SODIUM BLD-SCNC: 137 MMOL/L (ref 136–145)
TCO2 ARTERIAL: 28 MMOL/L
TCO2 ARTERIAL: 29 MMOL/L
TCO2 ARTERIAL: 29 MMOL/L
TCO2 ARTERIAL: 30 MMOL/L
TCO2 ARTERIAL: 33 MMOL/L
TCO2 ARTERIAL: 34 MMOL/L
TCO2 ARTERIAL: 35 MMOL/L
TOTAL PROTEIN: 7.3 G/DL (ref 6.4–8.2)
WBC # BLD: 14.3 K/UL (ref 4–11)
WBC # BLD: 7 K/UL (ref 4–11)

## 2022-04-06 PROCEDURE — 34714 OPN FEM ART EXPOS CNDT CRTJ: CPT | Performed by: THORACIC SURGERY (CARDIOTHORACIC VASCULAR SURGERY)

## 2022-04-06 PROCEDURE — 84295 ASSAY OF SERUM SODIUM: CPT

## 2022-04-06 PROCEDURE — 82330 ASSAY OF CALCIUM: CPT

## 2022-04-06 PROCEDURE — 2580000003 HC RX 258: Performed by: NURSE PRACTITIONER

## 2022-04-06 PROCEDURE — 3600000018 HC SURGERY OHS ADDTL 15MIN: Performed by: THORACIC SURGERY (CARDIOTHORACIC VASCULAR SURGERY)

## 2022-04-06 PROCEDURE — B245ZZ4 ULTRASONOGRAPHY OF LEFT HEART, TRANSESOPHAGEAL: ICD-10-PCS | Performed by: THORACIC SURGERY (CARDIOTHORACIC VASCULAR SURGERY)

## 2022-04-06 PROCEDURE — 33427 REPAIR OF MITRAL VALVE: CPT | Performed by: THORACIC SURGERY (CARDIOTHORACIC VASCULAR SURGERY)

## 2022-04-06 PROCEDURE — 3700000001 HC ADD 15 MINUTES (ANESTHESIA): Performed by: THORACIC SURGERY (CARDIOTHORACIC VASCULAR SURGERY)

## 2022-04-06 PROCEDURE — 82947 ASSAY GLUCOSE BLOOD QUANT: CPT

## 2022-04-06 PROCEDURE — 3600000008 HC SURGERY OHS BASE: Performed by: THORACIC SURGERY (CARDIOTHORACIC VASCULAR SURGERY)

## 2022-04-06 PROCEDURE — 33427 REPAIR OF MITRAL VALVE: CPT

## 2022-04-06 PROCEDURE — 6360000002 HC RX W HCPCS: Performed by: NURSE PRACTITIONER

## 2022-04-06 PROCEDURE — 83605 ASSAY OF LACTIC ACID: CPT

## 2022-04-06 PROCEDURE — 6370000000 HC RX 637 (ALT 250 FOR IP): Performed by: THORACIC SURGERY (CARDIOTHORACIC VASCULAR SURGERY)

## 2022-04-06 PROCEDURE — 33259 ABLATE ATRIA W/BYPASS ADD-ON: CPT | Performed by: THORACIC SURGERY (CARDIOTHORACIC VASCULAR SURGERY)

## 2022-04-06 PROCEDURE — 94669 MECHANICAL CHEST WALL OSCILL: CPT

## 2022-04-06 PROCEDURE — 2580000003 HC RX 258: Performed by: THORACIC SURGERY (CARDIOTHORACIC VASCULAR SURGERY)

## 2022-04-06 PROCEDURE — 3700000000 HC ANESTHESIA ATTENDED CARE: Performed by: THORACIC SURGERY (CARDIOTHORACIC VASCULAR SURGERY)

## 2022-04-06 PROCEDURE — 2720000010 HC SURG SUPPLY STERILE: Performed by: THORACIC SURGERY (CARDIOTHORACIC VASCULAR SURGERY)

## 2022-04-06 PROCEDURE — 83735 ASSAY OF MAGNESIUM: CPT

## 2022-04-06 PROCEDURE — 83880 ASSAY OF NATRIURETIC PEPTIDE: CPT

## 2022-04-06 PROCEDURE — 5A1221Z PERFORMANCE OF CARDIAC OUTPUT, CONTINUOUS: ICD-10-PCS | Performed by: THORACIC SURGERY (CARDIOTHORACIC VASCULAR SURGERY)

## 2022-04-06 PROCEDURE — C1760 CLOSURE DEV, VASC: HCPCS | Performed by: THORACIC SURGERY (CARDIOTHORACIC VASCULAR SURGERY)

## 2022-04-06 PROCEDURE — 82803 BLOOD GASES ANY COMBINATION: CPT

## 2022-04-06 PROCEDURE — 85025 COMPLETE CBC W/AUTO DIFF WBC: CPT

## 2022-04-06 PROCEDURE — 2700000000 HC OXYGEN THERAPY PER DAY

## 2022-04-06 PROCEDURE — 94761 N-INVAS EAR/PLS OXIMETRY MLT: CPT

## 2022-04-06 PROCEDURE — 02UG0JZ SUPPLEMENT MITRAL VALVE WITH SYNTHETIC SUBSTITUTE, OPEN APPROACH: ICD-10-PCS | Performed by: THORACIC SURGERY (CARDIOTHORACIC VASCULAR SURGERY)

## 2022-04-06 PROCEDURE — 6360000002 HC RX W HCPCS: Performed by: THORACIC SURGERY (CARDIOTHORACIC VASCULAR SURGERY)

## 2022-04-06 PROCEDURE — P9045 ALBUMIN (HUMAN), 5%, 250 ML: HCPCS | Performed by: THORACIC SURGERY (CARDIOTHORACIC VASCULAR SURGERY)

## 2022-04-06 PROCEDURE — C1729 CATH, DRAINAGE: HCPCS | Performed by: THORACIC SURGERY (CARDIOTHORACIC VASCULAR SURGERY)

## 2022-04-06 PROCEDURE — C1769 GUIDE WIRE: HCPCS | Performed by: THORACIC SURGERY (CARDIOTHORACIC VASCULAR SURGERY)

## 2022-04-06 PROCEDURE — C9290 INJ, BUPIVACAINE LIPOSOME: HCPCS | Performed by: THORACIC SURGERY (CARDIOTHORACIC VASCULAR SURGERY)

## 2022-04-06 PROCEDURE — 85027 COMPLETE CBC AUTOMATED: CPT

## 2022-04-06 PROCEDURE — 2500000003 HC RX 250 WO HCPCS: Performed by: THORACIC SURGERY (CARDIOTHORACIC VASCULAR SURGERY)

## 2022-04-06 PROCEDURE — 33259 ABLATE ATRIA W/BYPASS ADD-ON: CPT

## 2022-04-06 PROCEDURE — 94640 AIRWAY INHALATION TREATMENT: CPT

## 2022-04-06 PROCEDURE — 2500000003 HC RX 250 WO HCPCS: Performed by: ANESTHESIOLOGY

## 2022-04-06 PROCEDURE — 80076 HEPATIC FUNCTION PANEL: CPT

## 2022-04-06 PROCEDURE — 2580000003 HC RX 258: Performed by: ANESTHESIOLOGY

## 2022-04-06 PROCEDURE — 85610 PROTHROMBIN TIME: CPT

## 2022-04-06 PROCEDURE — 6370000000 HC RX 637 (ALT 250 FOR IP): Performed by: NURSE PRACTITIONER

## 2022-04-06 PROCEDURE — 2709999900 HC NON-CHARGEABLE SUPPLY: Performed by: THORACIC SURGERY (CARDIOTHORACIC VASCULAR SURGERY)

## 2022-04-06 PROCEDURE — 71045 X-RAY EXAM CHEST 1 VIEW: CPT

## 2022-04-06 PROCEDURE — 2780000010 HC IMPLANT OTHER: Performed by: THORACIC SURGERY (CARDIOTHORACIC VASCULAR SURGERY)

## 2022-04-06 PROCEDURE — 36415 COLL VENOUS BLD VENIPUNCTURE: CPT

## 2022-04-06 PROCEDURE — 2100000000 HC CCU R&B

## 2022-04-06 PROCEDURE — 34714 OPN FEM ART EXPOS CNDT CRTJ: CPT

## 2022-04-06 PROCEDURE — 80048 BASIC METABOLIC PNL TOTAL CA: CPT

## 2022-04-06 PROCEDURE — 7100000011 HC PHASE II RECOVERY - ADDTL 15 MIN

## 2022-04-06 PROCEDURE — C1894 INTRO/SHEATH, NON-LASER: HCPCS | Performed by: THORACIC SURGERY (CARDIOTHORACIC VASCULAR SURGERY)

## 2022-04-06 PROCEDURE — C2618 PROBE/NEEDLE, CRYO: HCPCS | Performed by: THORACIC SURGERY (CARDIOTHORACIC VASCULAR SURGERY)

## 2022-04-06 PROCEDURE — 7100000010 HC PHASE II RECOVERY - FIRST 15 MIN

## 2022-04-06 PROCEDURE — 85347 COAGULATION TIME ACTIVATED: CPT

## 2022-04-06 PROCEDURE — 2780000006 HC MISC HEART VALVE: Performed by: THORACIC SURGERY (CARDIOTHORACIC VASCULAR SURGERY)

## 2022-04-06 PROCEDURE — C1757 CATH, THROMBECTOMY/EMBOLECT: HCPCS | Performed by: THORACIC SURGERY (CARDIOTHORACIC VASCULAR SURGERY)

## 2022-04-06 PROCEDURE — 6370000000 HC RX 637 (ALT 250 FOR IP): Performed by: ANESTHESIOLOGY

## 2022-04-06 PROCEDURE — 85014 HEMATOCRIT: CPT

## 2022-04-06 PROCEDURE — 84132 ASSAY OF SERUM POTASSIUM: CPT

## 2022-04-06 PROCEDURE — 6360000002 HC RX W HCPCS: Performed by: ANESTHESIOLOGY

## 2022-04-06 PROCEDURE — 6370000000 HC RX 637 (ALT 250 FOR IP)

## 2022-04-06 PROCEDURE — 83036 HEMOGLOBIN GLYCOSYLATED A1C: CPT

## 2022-04-06 DEVICE — DEVICE OCCL CLP L45MM LAA EXCLUSION SYS ATRICLP PRO V: Type: IMPLANTABLE DEVICE | Site: HEART | Status: FUNCTIONAL

## 2022-04-06 DEVICE — IMPLANTABLE DEVICE: Type: IMPLANTABLE DEVICE | Site: HEART | Status: FUNCTIONAL

## 2022-04-06 RX ORDER — KETAMINE HYDROCHLORIDE 100 MG/ML
INJECTION, SOLUTION INTRAMUSCULAR; INTRAVENOUS PRN
Status: DISCONTINUED | OUTPATIENT
Start: 2022-04-06 | End: 2022-04-06 | Stop reason: SDUPTHER

## 2022-04-06 RX ORDER — ACETAMINOPHEN 650 MG/1
SUPPOSITORY RECTAL PRN
Status: DISCONTINUED | OUTPATIENT
Start: 2022-04-06 | End: 2022-04-06 | Stop reason: ALTCHOICE

## 2022-04-06 RX ORDER — CALCIUM CHLORIDE 100 MG/ML
INJECTION INTRAVENOUS; INTRAVENTRICULAR PRN
Status: DISCONTINUED | OUTPATIENT
Start: 2022-04-06 | End: 2022-04-06 | Stop reason: SDUPTHER

## 2022-04-06 RX ORDER — MAGNESIUM SULFATE IN WATER 40 MG/ML
2000 INJECTION, SOLUTION INTRAVENOUS PRN
Status: DISCONTINUED | OUTPATIENT
Start: 2022-04-06 | End: 2022-04-12

## 2022-04-06 RX ORDER — SODIUM CHLORIDE 0.9 % (FLUSH) 0.9 %
10 SYRINGE (ML) INJECTION PRN
Status: DISCONTINUED | OUTPATIENT
Start: 2022-04-06 | End: 2022-04-14 | Stop reason: HOSPADM

## 2022-04-06 RX ORDER — HYDRALAZINE HYDROCHLORIDE 20 MG/ML
5 INJECTION INTRAMUSCULAR; INTRAVENOUS EVERY 5 MIN PRN
Status: DISCONTINUED | OUTPATIENT
Start: 2022-04-06 | End: 2022-04-14 | Stop reason: HOSPADM

## 2022-04-06 RX ORDER — ATORVASTATIN CALCIUM 40 MG/1
40 TABLET, FILM COATED ORAL NIGHTLY
Status: DISCONTINUED | OUTPATIENT
Start: 2022-04-07 | End: 2022-04-14 | Stop reason: HOSPADM

## 2022-04-06 RX ORDER — NEOSTIGMINE METHYLSULFATE 1 MG/ML
INJECTION, SOLUTION INTRAVENOUS PRN
Status: DISCONTINUED | OUTPATIENT
Start: 2022-04-06 | End: 2022-04-06 | Stop reason: SDUPTHER

## 2022-04-06 RX ORDER — SUFENTANIL CITRATE 50 UG/ML
INJECTION EPIDURAL; INTRAVENOUS PRN
Status: DISCONTINUED | OUTPATIENT
Start: 2022-04-06 | End: 2022-04-06 | Stop reason: SDUPTHER

## 2022-04-06 RX ORDER — SODIUM CHLORIDE 9 MG/ML
INJECTION, SOLUTION INTRAVENOUS CONTINUOUS PRN
Status: DISCONTINUED | OUTPATIENT
Start: 2022-04-06 | End: 2022-04-06 | Stop reason: SDUPTHER

## 2022-04-06 RX ORDER — DEXMEDETOMIDINE HYDROCHLORIDE 4 UG/ML
.1-1.5 INJECTION, SOLUTION INTRAVENOUS CONTINUOUS
Status: DISCONTINUED | OUTPATIENT
Start: 2022-04-06 | End: 2022-04-07

## 2022-04-06 RX ORDER — METOPROLOL TARTRATE 5 MG/5ML
2.5 INJECTION INTRAVENOUS EVERY 10 MIN PRN
Status: DISCONTINUED | OUTPATIENT
Start: 2022-04-06 | End: 2022-04-14 | Stop reason: HOSPADM

## 2022-04-06 RX ORDER — LANOLIN ALCOHOL/MO/W.PET/CERES
400 CREAM (GRAM) TOPICAL 2 TIMES DAILY
Status: DISCONTINUED | OUTPATIENT
Start: 2022-04-07 | End: 2022-04-14 | Stop reason: HOSPADM

## 2022-04-06 RX ORDER — HEPARIN SODIUM 1000 [USP'U]/ML
INJECTION, SOLUTION INTRAVENOUS; SUBCUTANEOUS PRN
Status: DISCONTINUED | OUTPATIENT
Start: 2022-04-06 | End: 2022-04-06 | Stop reason: SDUPTHER

## 2022-04-06 RX ORDER — OXYCODONE HYDROCHLORIDE 5 MG/1
10 TABLET ORAL EVERY 4 HOURS PRN
Status: DISCONTINUED | OUTPATIENT
Start: 2022-04-06 | End: 2022-04-08

## 2022-04-06 RX ORDER — DEXTROSE MONOHYDRATE 50 MG/ML
100 INJECTION, SOLUTION INTRAVENOUS PRN
Status: DISCONTINUED | OUTPATIENT
Start: 2022-04-06 | End: 2022-04-14 | Stop reason: HOSPADM

## 2022-04-06 RX ORDER — MEPERIDINE HYDROCHLORIDE 50 MG/ML
25 INJECTION INTRAMUSCULAR; INTRAVENOUS; SUBCUTANEOUS
Status: ACTIVE | OUTPATIENT
Start: 2022-04-06 | End: 2022-04-06

## 2022-04-06 RX ORDER — ONDANSETRON 2 MG/ML
INJECTION INTRAMUSCULAR; INTRAVENOUS PRN
Status: DISCONTINUED | OUTPATIENT
Start: 2022-04-06 | End: 2022-04-06 | Stop reason: SDUPTHER

## 2022-04-06 RX ORDER — BUPIVACAINE HYDROCHLORIDE 2.5 MG/ML
INJECTION, SOLUTION EPIDURAL; INFILTRATION; INTRACAUDAL PRN
Status: DISCONTINUED | OUTPATIENT
Start: 2022-04-06 | End: 2022-04-06 | Stop reason: ALTCHOICE

## 2022-04-06 RX ORDER — DEXTROSE AND SODIUM CHLORIDE 5; .45 G/100ML; G/100ML
INJECTION, SOLUTION INTRAVENOUS CONTINUOUS
Status: DISCONTINUED | OUTPATIENT
Start: 2022-04-06 | End: 2022-04-07

## 2022-04-06 RX ORDER — FUROSEMIDE 10 MG/ML
20 INJECTION INTRAMUSCULAR; INTRAVENOUS EVERY 6 HOURS SCHEDULED
Status: DISCONTINUED | OUTPATIENT
Start: 2022-04-07 | End: 2022-04-08

## 2022-04-06 RX ORDER — PROTAMINE SULFATE 10 MG/ML
INJECTION, SOLUTION INTRAVENOUS PRN
Status: DISCONTINUED | OUTPATIENT
Start: 2022-04-06 | End: 2022-04-06 | Stop reason: SDUPTHER

## 2022-04-06 RX ORDER — DEXAMETHASONE SODIUM PHOSPHATE 4 MG/ML
INJECTION, SOLUTION INTRA-ARTICULAR; INTRALESIONAL; INTRAMUSCULAR; INTRAVENOUS; SOFT TISSUE PRN
Status: DISCONTINUED | OUTPATIENT
Start: 2022-04-06 | End: 2022-04-06 | Stop reason: SDUPTHER

## 2022-04-06 RX ORDER — ALBUTEROL SULFATE 2.5 MG/3ML
2.5 SOLUTION RESPIRATORY (INHALATION)
Status: DISCONTINUED | OUTPATIENT
Start: 2022-04-06 | End: 2022-04-14 | Stop reason: HOSPADM

## 2022-04-06 RX ORDER — FONDAPARINUX SODIUM 2.5 MG/.5ML
2.5 INJECTION SUBCUTANEOUS DAILY
Status: DISCONTINUED | OUTPATIENT
Start: 2022-04-07 | End: 2022-04-06

## 2022-04-06 RX ORDER — SODIUM CHLORIDE 0.9 % (FLUSH) 0.9 %
10 SYRINGE (ML) INJECTION EVERY 12 HOURS SCHEDULED
Status: DISCONTINUED | OUTPATIENT
Start: 2022-04-06 | End: 2022-04-14 | Stop reason: HOSPADM

## 2022-04-06 RX ORDER — ONDANSETRON 2 MG/ML
4 INJECTION INTRAMUSCULAR; INTRAVENOUS EVERY 6 HOURS PRN
Status: DISCONTINUED | OUTPATIENT
Start: 2022-04-06 | End: 2022-04-14 | Stop reason: HOSPADM

## 2022-04-06 RX ORDER — FAMOTIDINE 20 MG/1
20 TABLET, FILM COATED ORAL 2 TIMES DAILY
Status: DISCONTINUED | OUTPATIENT
Start: 2022-04-06 | End: 2022-04-08

## 2022-04-06 RX ORDER — PROTAMINE SULFATE 10 MG/ML
50 INJECTION, SOLUTION INTRAVENOUS
Status: ACTIVE | OUTPATIENT
Start: 2022-04-06 | End: 2022-04-06

## 2022-04-06 RX ORDER — AMINOCAPROIC ACID 250 MG/ML
INJECTION, SOLUTION INTRAVENOUS PRN
Status: DISCONTINUED | OUTPATIENT
Start: 2022-04-06 | End: 2022-04-06 | Stop reason: SDUPTHER

## 2022-04-06 RX ORDER — CHLORHEXIDINE GLUCONATE 0.12 MG/ML
15 RINSE ORAL 2 TIMES DAILY
Status: DISCONTINUED | OUTPATIENT
Start: 2022-04-06 | End: 2022-04-14 | Stop reason: HOSPADM

## 2022-04-06 RX ORDER — SODIUM CHLORIDE, SODIUM LACTATE, POTASSIUM CHLORIDE, CALCIUM CHLORIDE 600; 310; 30; 20 MG/100ML; MG/100ML; MG/100ML; MG/100ML
INJECTION, SOLUTION INTRAVENOUS CONTINUOUS PRN
Status: DISCONTINUED | OUTPATIENT
Start: 2022-04-06 | End: 2022-04-06 | Stop reason: SDUPTHER

## 2022-04-06 RX ORDER — INSULIN GLARGINE 100 [IU]/ML
0.15 INJECTION, SOLUTION SUBCUTANEOUS NIGHTLY
Status: DISCONTINUED | OUTPATIENT
Start: 2022-04-07 | End: 2022-04-11

## 2022-04-06 RX ORDER — KETOROLAC TROMETHAMINE 30 MG/ML
INJECTION, SOLUTION INTRAMUSCULAR; INTRAVENOUS PRN
Status: DISCONTINUED | OUTPATIENT
Start: 2022-04-06 | End: 2022-04-06 | Stop reason: SDUPTHER

## 2022-04-06 RX ORDER — NICOTINE POLACRILEX 4 MG
15 LOZENGE BUCCAL PRN
Status: DISCONTINUED | OUTPATIENT
Start: 2022-04-06 | End: 2022-04-14 | Stop reason: HOSPADM

## 2022-04-06 RX ORDER — DEXTROSE MONOHYDRATE 25 G/50ML
12.5 INJECTION, SOLUTION INTRAVENOUS PRN
Status: DISCONTINUED | OUTPATIENT
Start: 2022-04-06 | End: 2022-04-06

## 2022-04-06 RX ORDER — MIDAZOLAM HYDROCHLORIDE 1 MG/ML
INJECTION INTRAMUSCULAR; INTRAVENOUS PRN
Status: DISCONTINUED | OUTPATIENT
Start: 2022-04-06 | End: 2022-04-06 | Stop reason: SDUPTHER

## 2022-04-06 RX ORDER — MORPHINE SULFATE 4 MG/ML
4 INJECTION, SOLUTION INTRAMUSCULAR; INTRAVENOUS
Status: DISCONTINUED | OUTPATIENT
Start: 2022-04-06 | End: 2022-04-08

## 2022-04-06 RX ORDER — MIDAZOLAM HYDROCHLORIDE 1 MG/ML
1 INJECTION INTRAMUSCULAR; INTRAVENOUS
Status: ACTIVE | OUTPATIENT
Start: 2022-04-06 | End: 2022-04-07

## 2022-04-06 RX ORDER — OXYCODONE HYDROCHLORIDE 5 MG/1
5 TABLET ORAL EVERY 4 HOURS PRN
Status: DISCONTINUED | OUTPATIENT
Start: 2022-04-06 | End: 2022-04-08

## 2022-04-06 RX ORDER — MORPHINE SULFATE 2 MG/ML
2 INJECTION, SOLUTION INTRAMUSCULAR; INTRAVENOUS
Status: DISCONTINUED | OUTPATIENT
Start: 2022-04-06 | End: 2022-04-08

## 2022-04-06 RX ORDER — POTASSIUM CHLORIDE 29.8 MG/ML
20 INJECTION INTRAVENOUS PRN
Status: DISCONTINUED | OUTPATIENT
Start: 2022-04-06 | End: 2022-04-12

## 2022-04-06 RX ORDER — ASPIRIN 81 MG/1
81 TABLET ORAL DAILY
Status: DISCONTINUED | OUTPATIENT
Start: 2022-04-07 | End: 2022-04-14 | Stop reason: HOSPADM

## 2022-04-06 RX ORDER — ALBUMIN, HUMAN INJ 5% 5 %
25 SOLUTION INTRAVENOUS PRN
Status: DISCONTINUED | OUTPATIENT
Start: 2022-04-06 | End: 2022-04-14 | Stop reason: HOSPADM

## 2022-04-06 RX ORDER — POTASSIUM CHLORIDE 750 MG/1
10 TABLET, EXTENDED RELEASE ORAL
Status: DISCONTINUED | OUTPATIENT
Start: 2022-04-07 | End: 2022-04-11

## 2022-04-06 RX ORDER — ONDANSETRON 4 MG/1
4 TABLET, ORALLY DISINTEGRATING ORAL EVERY 8 HOURS PRN
Status: DISCONTINUED | OUTPATIENT
Start: 2022-04-06 | End: 2022-04-14 | Stop reason: HOSPADM

## 2022-04-06 RX ORDER — SODIUM CHLORIDE 9 MG/ML
25 INJECTION, SOLUTION INTRAVENOUS PRN
Status: DISCONTINUED | OUTPATIENT
Start: 2022-04-06 | End: 2022-04-14 | Stop reason: HOSPADM

## 2022-04-06 RX ORDER — CISATRACURIUM BESYLATE 2 MG/ML
INJECTION, SOLUTION INTRAVENOUS PRN
Status: DISCONTINUED | OUTPATIENT
Start: 2022-04-06 | End: 2022-04-06 | Stop reason: SDUPTHER

## 2022-04-06 RX ADMIN — CEFAZOLIN 1000 MG: 10 INJECTION, POWDER, FOR SOLUTION INTRAVENOUS at 12:13

## 2022-04-06 RX ADMIN — KETOROLAC TROMETHAMINE 30 MG: 30 INJECTION, SOLUTION INTRAMUSCULAR; INTRAVENOUS at 12:00

## 2022-04-06 RX ADMIN — VANCOMYCIN HYDROCHLORIDE 750 MG: 750 INJECTION, POWDER, LYOPHILIZED, FOR SOLUTION INTRAVENOUS at 21:51

## 2022-04-06 RX ADMIN — FAMOTIDINE 20 MG: 20 TABLET, FILM COATED ORAL at 20:24

## 2022-04-06 RX ADMIN — KETAMINE HYDROCHLORIDE 50 MG: 100 INJECTION INTRAMUSCULAR; INTRAVENOUS at 07:52

## 2022-04-06 RX ADMIN — SODIUM CHLORIDE 3.69 UNITS/HR: 9 INJECTION, SOLUTION INTRAVENOUS at 20:53

## 2022-04-06 RX ADMIN — MORPHINE SULFATE 2 MG: 2 INJECTION, SOLUTION INTRAMUSCULAR; INTRAVENOUS at 16:28

## 2022-04-06 RX ADMIN — AMINOCAPROIC ACID 5000 MG: 250 INJECTION, SOLUTION INTRAVENOUS at 09:10

## 2022-04-06 RX ADMIN — PROTAMINE SULFATE 225 MG: 10 INJECTION, SOLUTION INTRAVENOUS at 11:57

## 2022-04-06 RX ADMIN — ONDANSETRON 4 MG: 2 INJECTION INTRAMUSCULAR; INTRAVENOUS at 12:00

## 2022-04-06 RX ADMIN — MUPIROCIN: 20 OINTMENT TOPICAL at 20:27

## 2022-04-06 RX ADMIN — SODIUM CHLORIDE, SODIUM LACTATE, POTASSIUM CHLORIDE, AND CALCIUM CHLORIDE: .6; .31; .03; .02 INJECTION, SOLUTION INTRAVENOUS at 07:48

## 2022-04-06 RX ADMIN — SODIUM CHLORIDE: 9 INJECTION, SOLUTION INTRAVENOUS at 09:17

## 2022-04-06 RX ADMIN — CEFAZOLIN 2000 MG: 10 INJECTION, POWDER, FOR SOLUTION INTRAVENOUS at 08:15

## 2022-04-06 RX ADMIN — SUFENTANIL CITRATE 10 MCG: 50 INJECTION, SOLUTION EPIDURAL; INTRAVENOUS at 07:52

## 2022-04-06 RX ADMIN — CALCIUM CHLORIDE 0.25 G: 100 INJECTION, SOLUTION INTRAVENOUS at 12:00

## 2022-04-06 RX ADMIN — Medication 0.4 MG/HR: at 14:00

## 2022-04-06 RX ADMIN — MIDAZOLAM HYDROCHLORIDE 2 MG: 2 INJECTION, SOLUTION INTRAMUSCULAR; INTRAVENOUS at 07:52

## 2022-04-06 RX ADMIN — SODIUM CHLORIDE 4.65 UNITS/HR: 9 INJECTION, SOLUTION INTRAVENOUS at 09:17

## 2022-04-06 RX ADMIN — Medication 3 MG: at 13:10

## 2022-04-06 RX ADMIN — SODIUM BICARBONATE 50 MEQ: 84 INJECTION, SOLUTION INTRAVENOUS at 12:10

## 2022-04-06 RX ADMIN — DEXTROSE 2.13 MCG/MIN: 5 SOLUTION INTRAVENOUS at 20:00

## 2022-04-06 RX ADMIN — MUPIROCIN: 20 OINTMENT TOPICAL at 04:42

## 2022-04-06 RX ADMIN — CISATRACURIUM BESYLATE 5 MG: 2 INJECTION INTRAVENOUS at 10:15

## 2022-04-06 RX ADMIN — ALBUTEROL SULFATE 2.5 MG: 2.5 SOLUTION RESPIRATORY (INHALATION) at 20:33

## 2022-04-06 RX ADMIN — HEPARIN SODIUM 5000 UNITS: 1000 INJECTION, SOLUTION INTRAVENOUS; SUBCUTANEOUS at 12:52

## 2022-04-06 RX ADMIN — DEXTROSE AND SODIUM CHLORIDE: 5; 450 INJECTION, SOLUTION INTRAVENOUS at 14:00

## 2022-04-06 RX ADMIN — VANCOMYCIN HYDROCHLORIDE 1000 MG: 1 INJECTION, POWDER, LYOPHILIZED, FOR SOLUTION INTRAVENOUS at 08:15

## 2022-04-06 RX ADMIN — CISATRACURIUM BESYLATE 5 MG: 2 INJECTION INTRAVENOUS at 11:15

## 2022-04-06 RX ADMIN — HEPARIN SODIUM 17000 UNITS: 1000 INJECTION, SOLUTION INTRAVENOUS; SUBCUTANEOUS at 09:04

## 2022-04-06 RX ADMIN — SODIUM BICARBONATE 50 MEQ: 84 INJECTION, SOLUTION INTRAVENOUS at 12:59

## 2022-04-06 RX ADMIN — ALBUMIN (HUMAN) 12.5 G: 12.5 INJECTION, SOLUTION INTRAVENOUS at 15:58

## 2022-04-06 RX ADMIN — MORPHINE SULFATE 2 MG: 2 INJECTION, SOLUTION INTRAMUSCULAR; INTRAVENOUS at 14:21

## 2022-04-06 RX ADMIN — ASPIRIN 81 MG: 81 TABLET, COATED ORAL at 04:41

## 2022-04-06 RX ADMIN — CEFAZOLIN SODIUM 1000 MG: 1 INJECTION, POWDER, FOR SOLUTION INTRAMUSCULAR; INTRAVENOUS at 20:31

## 2022-04-06 RX ADMIN — SODIUM CHLORIDE, POTASSIUM CHLORIDE, SODIUM LACTATE AND CALCIUM CHLORIDE: 600; 310; 30; 20 INJECTION, SOLUTION INTRAVENOUS at 04:42

## 2022-04-06 RX ADMIN — METOPROLOL TARTRATE 12.5 MG: 25 TABLET, FILM COATED ORAL at 04:41

## 2022-04-06 RX ADMIN — CISATRACURIUM BESYLATE 15 MG: 2 INJECTION INTRAVENOUS at 07:54

## 2022-04-06 RX ADMIN — CALCIUM CHLORIDE 0.75 G: 100 INJECTION, SOLUTION INTRAVENOUS at 13:00

## 2022-04-06 RX ADMIN — Medication 15 ML: at 04:41

## 2022-04-06 RX ADMIN — Medication: at 20:27

## 2022-04-06 RX ADMIN — MORPHINE SULFATE 2 MG: 2 INJECTION, SOLUTION INTRAMUSCULAR; INTRAVENOUS at 18:38

## 2022-04-06 RX ADMIN — SUFENTANIL CITRATE 10 MCG: 50 INJECTION, SOLUTION EPIDURAL; INTRAVENOUS at 09:00

## 2022-04-06 RX ADMIN — DEXAMETHASONE SODIUM PHOSPHATE 12 MG: 4 INJECTION, SOLUTION INTRAMUSCULAR; INTRAVENOUS at 12:00

## 2022-04-06 RX ADMIN — PROTAMINE SULFATE 50 MG: 10 INJECTION, SOLUTION INTRAVENOUS at 13:05

## 2022-04-06 RX ADMIN — ASPIRIN 325 MG: 325 TABLET, COATED ORAL at 20:24

## 2022-04-06 RX ADMIN — Medication 15 ML: at 20:27

## 2022-04-06 RX ADMIN — ALBUMIN (HUMAN) 12.5 G: 12.5 INJECTION, SOLUTION INTRAVENOUS at 18:39

## 2022-04-06 RX ADMIN — SODIUM CHLORIDE, SODIUM LACTATE, POTASSIUM CHLORIDE, AND CALCIUM CHLORIDE: .6; .31; .03; .02 INJECTION, SOLUTION INTRAVENOUS at 13:08

## 2022-04-06 ASSESSMENT — PULMONARY FUNCTION TESTS
PIF_VALUE: 35
PIF_VALUE: 0
PIF_VALUE: 28
PIF_VALUE: 0
PIF_VALUE: 29
PIF_VALUE: 23
PIF_VALUE: 20
PIF_VALUE: 30
PIF_VALUE: 32
PIF_VALUE: 1
PIF_VALUE: 29
PIF_VALUE: 34
PIF_VALUE: 2
PIF_VALUE: 0
PIF_VALUE: 20
PIF_VALUE: 23
PIF_VALUE: 53
PIF_VALUE: 0
PIF_VALUE: 26
PIF_VALUE: 22
PIF_VALUE: 0
PIF_VALUE: 27
PIF_VALUE: 0
PIF_VALUE: 28
PIF_VALUE: 0
PIF_VALUE: 1
PIF_VALUE: 0
PIF_VALUE: 2
PIF_VALUE: 35
PIF_VALUE: 35
PIF_VALUE: 1
PIF_VALUE: 0
PIF_VALUE: 27
PIF_VALUE: 33
PIF_VALUE: 45
PIF_VALUE: 26
PIF_VALUE: 22
PIF_VALUE: 24
PIF_VALUE: 32
PIF_VALUE: 2
PIF_VALUE: 28
PIF_VALUE: 21
PIF_VALUE: 0
PIF_VALUE: 28
PIF_VALUE: 25
PIF_VALUE: 27
PIF_VALUE: 0
PIF_VALUE: 33
PIF_VALUE: 20
PIF_VALUE: 23
PIF_VALUE: 0
PIF_VALUE: 13
PIF_VALUE: 0
PIF_VALUE: 38
PIF_VALUE: 31
PIF_VALUE: 36
PIF_VALUE: 29
PIF_VALUE: 9
PIF_VALUE: 28
PIF_VALUE: 23
PIF_VALUE: 1
PIF_VALUE: 28
PIF_VALUE: 30
PIF_VALUE: 1
PIF_VALUE: 2
PIF_VALUE: 22
PIF_VALUE: 2
PIF_VALUE: 0
PIF_VALUE: 28
PIF_VALUE: 31
PIF_VALUE: 26
PIF_VALUE: 23
PIF_VALUE: 33
PIF_VALUE: 0
PIF_VALUE: 32
PIF_VALUE: 27
PIF_VALUE: 28
PIF_VALUE: 0
PIF_VALUE: 22
PIF_VALUE: 33
PIF_VALUE: 1
PIF_VALUE: 28
PIF_VALUE: 29
PIF_VALUE: 29
PIF_VALUE: 27
PIF_VALUE: 35
PIF_VALUE: 0
PIF_VALUE: 0
PIF_VALUE: 23
PIF_VALUE: 1
PIF_VALUE: 0
PIF_VALUE: 29
PIF_VALUE: 21
PIF_VALUE: 35
PIF_VALUE: 0
PIF_VALUE: 28
PIF_VALUE: 0
PIF_VALUE: 0
PIF_VALUE: 27
PIF_VALUE: 0
PIF_VALUE: 0
PIF_VALUE: 30
PIF_VALUE: 29
PIF_VALUE: 1
PIF_VALUE: 0
PIF_VALUE: 0
PIF_VALUE: 23
PIF_VALUE: 29
PIF_VALUE: 25
PIF_VALUE: 1
PIF_VALUE: 31
PIF_VALUE: 0
PIF_VALUE: 0
PIF_VALUE: 1
PIF_VALUE: 24
PIF_VALUE: 0
PIF_VALUE: 31
PIF_VALUE: 29
PIF_VALUE: 32
PIF_VALUE: 33
PIF_VALUE: 0
PIF_VALUE: 29
PIF_VALUE: 0
PIF_VALUE: 21
PIF_VALUE: 28
PIF_VALUE: 30
PIF_VALUE: 24
PIF_VALUE: 22
PIF_VALUE: 27
PIF_VALUE: 1
PIF_VALUE: 27
PIF_VALUE: 0
PIF_VALUE: 35
PIF_VALUE: 35
PIF_VALUE: 27
PIF_VALUE: 39
PIF_VALUE: 1
PIF_VALUE: 2
PIF_VALUE: 30
PIF_VALUE: 22
PIF_VALUE: 22
PIF_VALUE: 1
PIF_VALUE: 0
PIF_VALUE: 2
PIF_VALUE: 31
PIF_VALUE: 0
PIF_VALUE: 28
PIF_VALUE: 1
PIF_VALUE: 25
PIF_VALUE: 21
PIF_VALUE: 31
PIF_VALUE: 0
PIF_VALUE: 1
PIF_VALUE: 35
PIF_VALUE: 0
PIF_VALUE: 1
PIF_VALUE: 0
PIF_VALUE: 0
PIF_VALUE: 29
PIF_VALUE: 30
PIF_VALUE: 2
PIF_VALUE: 0
PIF_VALUE: 22
PIF_VALUE: 22
PIF_VALUE: 28
PIF_VALUE: 0
PIF_VALUE: 1
PIF_VALUE: 0
PIF_VALUE: 1
PIF_VALUE: 24
PIF_VALUE: 31
PIF_VALUE: 27
PIF_VALUE: 0
PIF_VALUE: 22
PIF_VALUE: 26
PIF_VALUE: 53
PIF_VALUE: 1
PIF_VALUE: 28
PIF_VALUE: 31
PIF_VALUE: 31
PIF_VALUE: 0
PIF_VALUE: 38
PIF_VALUE: 27
PIF_VALUE: 0
PIF_VALUE: 0
PIF_VALUE: 32
PIF_VALUE: 28
PIF_VALUE: 0
PIF_VALUE: 0
PIF_VALUE: 27
PIF_VALUE: 22
PIF_VALUE: 2
PIF_VALUE: 25
PIF_VALUE: 30
PIF_VALUE: 0
PIF_VALUE: 1
PIF_VALUE: 26
PIF_VALUE: 29
PIF_VALUE: 22
PIF_VALUE: 22
PIF_VALUE: 28
PIF_VALUE: 23
PIF_VALUE: 0
PIF_VALUE: 42
PIF_VALUE: 0
PIF_VALUE: 0
PIF_VALUE: 1
PIF_VALUE: 35
PIF_VALUE: 1
PIF_VALUE: 31
PIF_VALUE: 27
PIF_VALUE: 28
PIF_VALUE: 26
PIF_VALUE: 0
PIF_VALUE: 0
PIF_VALUE: 27
PIF_VALUE: 2
PIF_VALUE: 35
PIF_VALUE: 22
PIF_VALUE: 22
PIF_VALUE: 29
PIF_VALUE: 22
PIF_VALUE: 0
PIF_VALUE: 28
PIF_VALUE: 28
PIF_VALUE: 0
PIF_VALUE: 13
PIF_VALUE: 21
PIF_VALUE: 27
PIF_VALUE: 22
PIF_VALUE: 22
PIF_VALUE: 33
PIF_VALUE: 33
PIF_VALUE: 0
PIF_VALUE: 6
PIF_VALUE: 26
PIF_VALUE: 2
PIF_VALUE: 0
PIF_VALUE: 1
PIF_VALUE: 31
PIF_VALUE: 22
PIF_VALUE: 0
PIF_VALUE: 24
PIF_VALUE: 0
PIF_VALUE: 23
PIF_VALUE: 1
PIF_VALUE: 26
PIF_VALUE: 21
PIF_VALUE: 31
PIF_VALUE: 0
PIF_VALUE: 31
PIF_VALUE: 1
PIF_VALUE: 37
PIF_VALUE: 27
PIF_VALUE: 22
PIF_VALUE: 33
PIF_VALUE: 0
PIF_VALUE: 24
PIF_VALUE: 0
PIF_VALUE: 31
PIF_VALUE: 22
PIF_VALUE: 1
PIF_VALUE: 0
PIF_VALUE: 23
PIF_VALUE: 1
PIF_VALUE: 1
PIF_VALUE: 24
PIF_VALUE: 21
PIF_VALUE: 0
PIF_VALUE: 22
PIF_VALUE: 0
PIF_VALUE: 1
PIF_VALUE: 0
PIF_VALUE: 37
PIF_VALUE: 44
PIF_VALUE: 2
PIF_VALUE: 27
PIF_VALUE: 28
PIF_VALUE: 0
PIF_VALUE: 0
PIF_VALUE: 22
PIF_VALUE: 0
PIF_VALUE: 32
PIF_VALUE: 0
PIF_VALUE: 56
PIF_VALUE: 0
PIF_VALUE: 24
PIF_VALUE: 20
PIF_VALUE: 29
PIF_VALUE: 0
PIF_VALUE: 1
PIF_VALUE: 0
PIF_VALUE: 27
PIF_VALUE: 31
PIF_VALUE: 22
PIF_VALUE: 28
PIF_VALUE: 23
PIF_VALUE: 0
PIF_VALUE: 39
PIF_VALUE: 2
PIF_VALUE: 27
PIF_VALUE: 38
PIF_VALUE: 0
PIF_VALUE: 0
PIF_VALUE: 29
PIF_VALUE: 24
PIF_VALUE: 24
PIF_VALUE: 29
PIF_VALUE: 0
PIF_VALUE: 31
PIF_VALUE: 21
PIF_VALUE: 6
PIF_VALUE: 1
PIF_VALUE: 29
PIF_VALUE: 29
PIF_VALUE: 33
PIF_VALUE: 0
PIF_VALUE: 25
PIF_VALUE: 28
PIF_VALUE: 25
PIF_VALUE: 22
PIF_VALUE: 0
PIF_VALUE: 0
PIF_VALUE: 1
PIF_VALUE: 0
PIF_VALUE: 29
PIF_VALUE: 29

## 2022-04-06 ASSESSMENT — PAIN SCALES - GENERAL
PAINLEVEL_OUTOF10: 10

## 2022-04-06 NOTE — PROGRESS NOTES
Pt had both Hibiclens showers, roomed cleaned with bleach, sacral heart in place and pre op meds given.

## 2022-04-06 NOTE — FLOWSHEET NOTE
04/05/22 2015   Assessment   Charting Type Shift assessment   Neurological   Neuro (WDL) WDL   Level of Consciousness Alert (0)   [place] Coma Scale   Eye Opening 4   Best Verbal Response 5   Best Motor Response 6   [place] Coma Scale Score 15   HEENT   HEENT (WDL) WDL   Right Eye Intact   Left Eye Intact   Nose Intact   Throat Intact   Neck Symmetrical;Trachea midline   Tongue Pink & moist   Voice Normal   Mucous Membrane Moist;Pink; Intact   Teeth Intact   Respiratory   Respiratory (WDL) WDL   Respiratory Pattern Regular   Respiratory Depth Normal   Respiratory Quality/Effort Unlabored   Chest Assessment Chest expansion symmetrical;Trachea midline   Breath Sounds   Right Upper Lobe Clear   Right Middle Lobe Clear   Right Lower Lobe Clear   Left Upper Lobe Clear   Left Lower Lobe Clear   Cough/Sputum   Cough Congested;Non-productive   Cardiac   Cardiac (WDL) X   Cardiac Regularity Irregular   Cardiac Rhythm Atrial fib   Cardiac Monitor   Telemetry Monitor On Yes   Telemetry Audible Yes   Telemetry Alarms Set Yes   Gastrointestinal   Abdominal (WDL) WDL   Abdomen Inspection Flat   RUQ Bowel Sounds Active   LUQ Bowel Sounds Active   RLQ Bowel Sounds Active   LLQ Bowel Sounds Active   Peripheral Vascular   Peripheral Vascular (WDL) WDL   Edema None   RUE Neurovascular Assessment   Capillary Refill Less than/equal to 3 seconds   Color Appropriate for ethnicity   Temperature Warm   R Radial Pulse +2   LUE Neurovascular Assessment   Capillary Refill Less than/equal to 3 seconds   Color Appropriate for ethnicity   Temperature Warm   L Radial Pulse +2   RLE Neurovascular Assessment   Capillary Refill Less than/equal to 3 seconds   Color Appropriate for ethnicity   Temperature Warm   R Pedal Pulse +2   LLE Neurovascular Assessment   Capillary Refill Less than/equal to 3 seconds   Color Appropriate for ethnicity   Temperature Warm   L Pedal Pulse +2   Puncture Site Assessment 1   Location Femoral - left   Site Assessment No redness, drainage, swelling or hematoma   Hemostasis Intervention Discontinued   Skin Color/Condition   Skin Color/Condition (WDL) WDL   Skin Integrity   Skin Integrity (WDL) X   Skin Integrity Bruising   Location scattered    Musculoskeletal   Musculoskeletal (WDL) WDL   RUE Full movement   LUE Full movement   RL Extremity Full movement   LL Extremity Full movement   Genitourinary   Genitourinary (WDL) WDL   Flank Tenderness No   Suprapubic Tenderness No   Dysuria No   Urine Assessment   Incontinence No   Anus/Rectum   Anus/Rectum (WDL) WDL  (per pt)   Psychosocial   Psychosocial (WDL) WDL

## 2022-04-06 NOTE — ANESTHESIA POSTPROCEDURE EVALUATION
Department of Anesthesiology  Postprocedure Note    Patient: Michaela Minor  MRN: [id number]  YOB: 1955  Date of evaluation: 4/6/2022  Time:  1:45 PM     Procedure Summary     Date: 04/06/22 Room / Location: [place] [address] / [facility]    Anesthesia Start: 5029 Anesthesia Stop: 0608    Procedure: MINIMAL ACCESS MITRAL VALVE REPAIR USING HUTCHINSON 38MM PHYSIO II ANNULOPLASTY RING, RIGHT SIDED MAZE, LEFT ATERIAL APPENDAGE CLIP PLACEMENT, CRYO NERVE ABLATION, ON PUMP, WITH BILATERAL PECTORALIS BLOCKS, XAVIER, AND TEMPORARY PACING WIRE PLACEMENT (N/A Chest) Diagnosis: (-)    Surgeons: Oleg Montague MD Responsible Provider: Carmen Zaragoza MD    Anesthesia Type: general ASA Status: 3          Anesthesia Type: general    Daniel Phase I:      Daniel Phase II:      Last vitals: Reviewed and per EMR flowsheets. Anesthesia Post Evaluation    Patient location during evaluation: ICU  Patient participation: waiting for patient participation  Level of consciousness: awake and combative  Pain scale: see nsg notes.   Airway patency: patent  Nausea & Vomiting: no nausea and no vomiting  Complications: no  Cardiovascular status: hemodynamically stable  Respiratory status: face mask and acceptable  Hydration status: stable

## 2022-04-06 NOTE — PROGRESS NOTES
04/06/22 1536   Oxygen Therapy/Pulse Ox   O2 Therapy Oxygen   $Oxygen $Daily Charge   O2 Device Nasal cannula   O2 Flow Rate (L/min) 4 L/min   Resp 12   SpO2 100 %   $Pulse Oximeter $Spot check (multiple/continuous)

## 2022-04-06 NOTE — PROGRESS NOTES
Patient admitted to C221 from OR s/p MVR, R partial maze and ISAAC clip. No gtts currently running. Dr Rashi Grant at bedside, connected to C2 monitors. VSS. CT connected to suction. 4L NC. ABG and labs drawn, CXR obtained - verified by Dr Maribel Mcdonald at bedside. Patient V-paced at [de-identified].  Will continue to monitor

## 2022-04-06 NOTE — PROGRESS NOTES
Hospitalist Progress Note      PCP: Alfredo Esteban MD    Date of Admission: 3/30/2022    Chief Complaint: [facility] Course: Tom Pina is a 75 y.o. female who presented to the ED to be evaluated for a several year history of heart palpitations, which became associated with weakness and dyspnea 3 months PTA. The patient reports that she has not been seen by any type of physician for greater than 2 decades. She is scheduled a new PCP appointment for earlier today, but when she arrived she was tachycardic into the 150s therefore EMS was contacted to transport her to the ED. Upon further questioning, patient endorses intermittent chest pain for which she takes a baby aspirin PRN. She states that she has noted increased THOMPSON as well as BLE edema ongoing since the beginning of 2022.     Upon arrival to the ED EKG was obtained revealing A. fib RVR with ventricular rate 141; PVCs, anterior infarct of unknown duration, L APB, and QTC prolongation also appreciated. CXR notable for bilateral pleural effusions as well as bibasilar opacities. Admitting hospitalist physician has ordered chest CT with PE protocol to further assess. Labs were essentially unremarkable excluding BNP 3634. Patient received IV Cardizem bolus and drip initiation per ED provider. She will be admitted for new onset A. fib with suspected decompensated CHF. Subjective: Resting, NAD. Updated on plan of care.  VSS, CT surg consulted, plan for MV repair his morning      Medications:  Reviewed    Infusion Medications    norepinephrine      lactated ringers 50 mL/hr at 04/06/22 0442    sodium chloride      dilTIAZem Stopped (03/31/22 1339)     Scheduled Medications    del nido cardioplegia custom solution   Perfusion Once    del nido cardioplegia custom solution   Perfusion Once    del nido cardioplegia custom solution   Perfusion Once    mupirocin   Nasal BID    chlorhexidine   Topical See Admin Instructions    ceFAZolin (ANCEF) IVPB  2,000 mg IntraVENous On Call to OR    vancomycin (VANCOCIN) IV  1,000 mg IntraVENous On Call to OR    bisacodyl  10 mg Rectal Once    metoprolol succinate  50 mg Oral BID    digoxin  125 mcg Oral Daily    sodium chloride flush  5-40 mL IntraVENous 2 times per day    [Held by provider] enoxaparin  1 mg/kg SubCUTAneous BID    aspirin  162 mg Oral Once    aspirin  81 mg Oral Daily     PRN Meds: acetaminophen, Local Anesthetic Custom Mixture, morphine, levalbuterol, sodium chloride flush, sodium chloride, acetaminophen, butalbital-acetaminophen-caffeine, potassium chloride **OR** potassium alternative oral replacement **OR** potassium chloride, magnesium sulfate, senna, acetaminophen **OR** acetaminophen, perflutren lipid microspheres, ondansetron **OR** ondansetron      Intake/Output Summary (Last 24 hours) at 4/6/2022 0958  Last data filed at 4/6/2022 0527  Gross per 24 hour   Intake 0 ml   Output 1600 ml   Net -1600 ml       Physical Exam Performed:    /69   Pulse 102   Temp 97.5 °F (36.4 °C) (Oral)   Resp 18   Ht 5' (1.524 m)   Wt 96 lb 3 oz (43.6 kg)   SpO2 96%   BMI 18.79 kg/m²     General appearance: No apparent distress, appears stated age and cooperative. HEENT: Pupils equal, round, and reactive to light. Conjunctivae/corneas clear. Neck: Supple, with full range of motion. No jugular venous distention. Trachea midline. Respiratory:  Normal respiratory effort. Clear to auscultation, bilaterally without Rales/Wheezes/Rhonchi. Cardiovascular: Irregular rate and rhythm with BAY  Abdomen: Soft, non-tender, non-distended with normal bowel sounds. Musculoskeletal: No clubbing, cyanosis or edema bilaterally. Full range of motion without deformity. Skin: Skin color, texture, turgor normal.  No rashes or lesions. Neurologic:  Neurovascularly intact without any focal sensory/motor deficits.  Cranial nerves: II-XII intact, grossly non-focal.  Psychiatric: Alert and oriented, thought content appropriate, normal insight  Capillary Refill: Brisk,3 seconds, normal   Peripheral Pulses: +2 palpable, equal bilaterally       Labs:   Recent Labs     04/05/22  1037 04/05/22  1037 04/06/22  0640 04/06/22  0828 04/06/22  0911   WBC 9.1  --  7.0  --   --    HGB 13.8   < > 13.9 13.0 12.6   HCT 41.5  --  41.2  --   --      --  207  --   --     < > = values in this interval not displayed. Recent Labs     04/04/22  0450 04/05/22  0457 04/06/22  0640   * 132* 130*   K 3.9 4.0 4.5   CL 92* 91* 93*   CO2 30 28 28   BUN 27* 28* 29*   CREATININE 0.8 0.8 0.7   CALCIUM 9.0 9.1 9.5     Recent Labs     04/06/22  0640   AST 27   ALT 22   BILIDIR <0.2   BILITOT 0.7   ALKPHOS 81     Recent Labs     04/06/22  0640   INR 1.22*     Recent Labs     04/05/22  0457   TROPONINI 0.01       Urinalysis:      Lab Results   Component Value Date    NITRU Negative 03/30/2022    BLOODU Negative 03/30/2022    SPECGRAV 1.010 03/30/2022    GLUCOSEU Negative 03/30/2022       Radiology:  XR CHEST PORTABLE   Final Result   Moderate-sized right pleural effusion, similar to CT chest 03/30/2022. Right perihilar atelectasis, versus pneumonia. VL DUP CAROTID BILATERAL   Final Result      CT CHEST PULMONARY EMBOLISM W CONTRAST   Final Result   No evidence of pulmonary embolism      Cardiomegaly, including severe left atrial enlargement. Small bilateral pleural effusions, right greater than left, with associated   atelectasis. Mild smooth interlobular septal thickening, favored to represent mild   interstitial pulmonary edema.          XR CHEST PORTABLE   Final Result   Bilateral pleural effusions right greater than left with bibasilar opacities   that could represent atelectasis or pneumonia                 Assessment/Plan:    Active Hospital Problems    Diagnosis     Mitral valve insufficiency [I34.0]     Atrial fibrillation with RVR ([address] [I48.91]     New onset atrial fibrillation ([address] [I48.91]  Decompensated heart failure (HCC) [I50.9]     Pleural effusion, bilateral [J90]     LAFB (left anterior fascicular block) [I44.4]     Cardiomegaly [J98.6]     Pansystolic murmur [H66.3]      New onset A. fib with RVR  -Tele  -Patient initiated on IV Cardizem bolus and drip, the latter of which will be continued overnight  -Electrolytes, thyroid studies to assess for secondary causes of this tachyarrhythmia  -Patient with CHADS2 score of 3, Eliquis initiated-however d/c'd this morning after Echo findings-start lovenox tx dose  -Echo: EF 55%, severe MR, mild AR, mild to mod TR  -Cardiology consulted  -LHC: nl coronaries, RHC High-nl left vent filling pressure, elevated wedge pressure, mil elevated pulm pressure  -CT surg consulted plan for MV repair with MAZE and ISAAC clip 4/6     Chest pain   -ASA daily, BB  -FLP , HDL 70, Cholesterol 185; patient declined statin this evening  -Nitropaste applied to chest wall (BP permitting)  -Nuclear stress test not ordered at this time due to A. fib RVR presentation  -Serial troponin negative     Pulmonary edema  -Suspect decompensated CHF, although patient with no previous cardiac history or echo available for review  -Chest CT with PE protocol ordered revealing massive cardiomegaly with severe LAE enlargement  -Admit to floor for continuous telemetry monitoring and strict I's & O's  -Lasix changed to PO, wt down 2.6kg  -2G Na and fluid restriction dietary modifications in place    DVT Prophylaxis: Lovenox, scds  Diet: Diet NPO  Code Status: Full Code    PT/OT Eval Status: ambulatory    Dispo - will sign off and transfer care to CT surg, dc several days    Rhonda Huntley, APRN - CNP

## 2022-04-06 NOTE — PLAN OF CARE
Nutrition Problem #1: Increased nutrient needs  Intervention: Food and/or Nutrient Delivery: Modify Current Diet,Start Oral Nutrition Supplement  Nutritional Goals: Consume 50% or greater of 3 meals per day and ONS during this admission.

## 2022-04-06 NOTE — PROGRESS NOTES
Comprehensive Nutrition Assessment    Type and Reason for Visit:  Initial,RD Nutrition Re-Screen/LOS    Nutrition Recommendations/Plan:   1. Modify diet to GUIDO and encourage PO intake   2. RD to add [employer] BID   3. Monitor for appropriate time for diet education, d/t new CHF  4. Monitor nutrition adequacy, pertinent labs, bowel habits, wt changes, and clinical progress    Nutrition Assessment:  LOS assessment: 75 y.o female admitted w/ a fib and new onset CHF. S/p MV repair today. Unable to visit pt today d/t surgery. Pt on regular diet w/ good PO intakes of % of meals. RD to modify diet to GUIDO. Downward trend of wt loss noted, pt was 107 lb on admission (3/30) and currently 96 lb. JOCELIN significant wt loss d/t varying fluid status, pt currently -8.8 L since admission. Pt also w/ fluid overload at time of admission. RD to add [employer] BID (appropriate for allergies). Continue to encourage PO intake. RD to provide CHF and heart healthy diet education when appropriate. Will continue to monitor. Malnutrition Assessment:  Malnutrition Status: At risk for malnutrition (Comment)    Context:  Acute Illness     Findings of the 6 clinical characteristics of malnutrition:  Energy Intake:  No significant decrease in energy intake  Weight Loss:  Unable to assess (Downward trend may be d/t fluid, pt admitted w/ fluid overload)       Estimated Daily Nutrient Needs:  Energy (kcal):  9581-1517 kcals; Weight Used for Energy Requirements:  Ideal (45 kg)     Protein (g):  45-54 g; Weight Used for Protein Requirements:  Ideal (1-1.2 g/kg)        Method Used for Fluid Requirements:  1 ml/kcal      Nutrition Related Findings:  -8.8 L since admission. + BM 4/3. Na 130. Chest tube. Wounds:  Surgical Incision       Current Nutrition Therapies:    ADULT DIET;  Regular    Anthropometric Measures:  · Height: 5' (152.4 cm)  · Current Body Weight: 96 lb (43.5 kg)   · Ideal Body Weight: 100 lbs; % Ideal Body Weight 96 % · BMI: 18.7   · BMI Categories: Underweight (BMI less than 22) age over 72       Nutrition Diagnosis:   · Increased nutrient needs related to increase demand for energy/nutrients as evidenced by other (comment),weight loss (Prolonged hospital stay and s/p surgery)    Nutrition Interventions:   Food and/or Nutrient Delivery:  Modify Current Diet,Start Oral Nutrition Supplement  Nutrition Education/Counseling:  Education not appropriate   Coordination of Nutrition Care:  Continue to monitor while inpatient    Goals:  Consume 50% or greater of 3 meals per day and ONS during this admission.        Nutrition Monitoring and Evaluation:   Behavioral-Environmental Outcomes:  None Identified   Food/Nutrient Intake Outcomes:  Food and Nutrient Intake,Supplement Intake  Physical Signs/Symptoms Outcomes:  Biochemical Data,Constipation,Fluid Status or Edema,Weight,Nutrition Focused Physical Findings     Discharge Planning:    Continue current diet,Continue Oral Nutrition Supplement     Electronically signed by Delberta Kussmaul, MS, RD, LD on 4/6/22 at 1:43 PM EDT    Contact: Office: [phone]; [address] [place] [address] Road: [facility]

## 2022-04-06 NOTE — CARE COORDINATION
MVR today- still being recovered at this time. Aware patient IPTA and lives alone. Following for PT/OT assessments. May need ARU referral?    Following.     Burman Hammans, RN

## 2022-04-06 NOTE — BRIEF OP NOTE
Brief Postoperative Note      Patient: Nereida Lozano  YOB: 1955  MRN: [id number]    Date of Procedure: 4/6/2022    Pre-Op Diagnosis: -MVR repair     Post-Op Diagnosis: Same       Procedure(s): MINIMAL ACCESS MITRAL VALVE REPAIR USING HUTCHINSON 38MM PHYSIO II ANNULOPLASTY RING, RIGHT SIDED MAZE, LEFT ATERIAL APPENDAGE CLIP PLACEMENT, CRYO NERVE ABLATION, ON PUMP, WITH BILATERAL PECTORALIS BLOCKS, XAVIER, AND TEMPORARY PACING WIRE PLACEMENT    Surgeon(s):  Chante Lee MD    Assistant:  Surgical Assistant: Piedad Maldonado  Physician Assistant: Darylene Gosselin, PA    Anesthesia: General    Estimated Blood Loss (mL): 419    Complications: None      Implants:  Implant Name Type Inv.  Item Serial No.  Lot No. LRB No. Used Action   RING ANNULPLSTY [id number] MI VLV ANG HLDR PHY II - [id number] Annuloplasty rings RING ANNULPLSTY [id number] MI VLV ANG HLDR PHY II [id number] [email or web address] ALEX[employer]WD  N/A 1 Implanted   DEVICE OCCL CLP L45MM ISAAC EXCLUSION SYS ATRICLP PRO V - [id number]  DEVICE OCCL CLP L45MM ISAAC EXCLUSION SYS ATRICLP PRO V  [employer] INC-WD [id number] N/A 1 Implanted     Findings:     Electronically signed by Chante Lee MD on 4/6/2022 at 1:20 PM

## 2022-04-06 NOTE — PROGRESS NOTES
Shift: 0700    Nursing assessment at handoff  stable    Rhythm changes Atrial Fibrillation 60-70s    Rhythm Intervention V wires set up, but disconnected per Dr Lena Dozier     Drop in Urinary Output no     Changes to drips? N/A    POD 1 ONLY: Pacing Wires Removed: []Yes           [] NO        [] Platelets < 42,330        [] Arrhythmia        [] Bradycardia        [] Valve Replacement         [] Pacing for Cardiac Index    LDA Insertion Date Date Changed (if needed) Discontinued Date   Art line 4/6     Central Line 4/6     ET Tube 4/6  4/6   Panchal 4/6     Chest Tube 4/6     Pacing Wires 4/6       Surgery, return to OR no     Hospital Course:  POD# 0  Admitted to C2 at [phone] s/p MVR, R partial maze and ISAAC clip. Precedex at 0.3, Albumin x1. VSS. CTx1. 4L NC. Temp V wires, Afib 60-70s      Changes in O2 requirements Oxygen Therapy  SpO2: 99 %  Pulse Oximeter Device Mode: Intermittent  Pulse Oximeter Device Location: Finger  O2 Device: Nasal cannula  O2 Flow Rate (L/min): 4 L/min     Most recent vitals BP (!) 102/59   Pulse 75   Temp 96.8 °F (36 °C) (Bladder) Comment: warm blankets applied  Resp 8   Ht 5' (1.524 m)   Wt 96 lb 3 oz (43.6 kg)   SpO2 99%   BMI 18.79 kg/m²       Admission weight Weight: 107 lb (48.5 kg)     Today's weight   Wt Readings from Last 1 Encounters:   04/06/22 96 lb 3 oz (43.6 kg)         Lab Data:  CBC:   Recent Labs     04/06/22  1355   WBC 14.3*   HGB 13.0   HCT 39.6   MCV 94.5   *     BMP:    Recent Labs     04/06/22  1355      K 4.5   CO2 29   BUN 22*   CREATININE 0.7     LIVR:   Recent Labs     04/06/22  0640   AST 27   ALT 22     PT/INR:   Recent Labs     04/06/22  0640   PROT 7.3   INR 1.22*     APTT: No results for input(s): APTT in the last 72 hours.     Drip rates at handoff:    dextrose 5 % and 0.45 % NaCl 75 mL/hr at 04/06/22 1400    sodium chloride      niCARdipine      insulin Stopped (04/06/22 1345)    dextrose      norepinephrine      dexmedetomidine 0.4 mg/hr (04/06/22 1400)         Electronically signed by Kendall Price RN on 4/6/22 at 5:34 PM EDT

## 2022-04-06 NOTE — PROGRESS NOTES
Patient woke and clamped down, decreased BP to 80s/40s - CI remained >2.5, CVP ~7.  Additional albumin given and levo restarted at 2. Dr Nahomy Calderón made aware - indicated patient still in Afib and not perfusing well d/t rhythm. Dr Nahomy Calderón ordered additional albumin and to give amio bolus and infusion per standard protocol.  Will give and continue to monitor

## 2022-04-07 ENCOUNTER — APPOINTMENT (OUTPATIENT)
Dept: GENERAL RADIOLOGY | Age: 67
DRG: 216 | End: 2022-04-07
Payer: MEDICARE

## 2022-04-07 LAB
ACTIVATED CLOTTING TIME: >1005 SEC (ref 99–130)
ACTIVATED CLOTTING TIME: >1005 SEC (ref 99–130)
ANION GAP SERPL CALCULATED.3IONS-SCNC: 7 MMOL/L (ref 3–16)
BUN BLDV-MCNC: 22 MG/DL (ref 7–20)
CALCIUM SERPL-MCNC: 8.7 MG/DL (ref 8.3–10.6)
CHLORIDE BLD-SCNC: 100 MMOL/L (ref 99–110)
CO2: 28 MMOL/L (ref 21–32)
CREAT SERPL-MCNC: 0.6 MG/DL (ref 0.6–1.2)
EKG ATRIAL RATE: 375 BPM
EKG DIAGNOSIS: NORMAL
EKG Q-T INTERVAL: 380 MS
EKG QRS DURATION: 108 MS
EKG QTC CALCULATION (BAZETT): 427 MS
EKG R AXIS: 97 DEGREES
EKG T AXIS: 17 DEGREES
EKG VENTRICULAR RATE: 76 BPM
GFR AFRICAN AMERICAN: >60
GFR NON-AFRICAN AMERICAN: >60
GLUCOSE BLD-MCNC: 107 MG/DL (ref 70–99)
GLUCOSE BLD-MCNC: 113 MG/DL (ref 70–99)
GLUCOSE BLD-MCNC: 114 MG/DL (ref 70–99)
GLUCOSE BLD-MCNC: 136 MG/DL (ref 70–99)
GLUCOSE BLD-MCNC: 186 MG/DL (ref 70–99)
GLUCOSE BLD-MCNC: 75 MG/DL (ref 70–99)
GLUCOSE BLD-MCNC: 79 MG/DL (ref 70–99)
GLUCOSE BLD-MCNC: 81 MG/DL (ref 70–99)
GLUCOSE BLD-MCNC: 84 MG/DL (ref 70–99)
GLUCOSE BLD-MCNC: 87 MG/DL (ref 70–99)
HCT VFR BLD CALC: 32.6 % (ref 36–48)
HEMOGLOBIN: 10.9 G/DL (ref 12–16)
MAGNESIUM: 2.2 MG/DL (ref 1.8–2.4)
MCH RBC QN AUTO: 31.7 PG (ref 26–34)
MCHC RBC AUTO-ENTMCNC: 33.4 G/DL (ref 31–36)
MCV RBC AUTO: 94.9 FL (ref 80–100)
PDW BLD-RTO: 13.6 % (ref 12.4–15.4)
PERFORMED ON: ABNORMAL
PERFORMED ON: NORMAL
PLATELET # BLD: 89 K/UL (ref 135–450)
PLATELET SLIDE REVIEW: ABNORMAL
PMV BLD AUTO: 8.2 FL (ref 5–10.5)
POTASSIUM SERPL-SCNC: 5 MMOL/L (ref 3.5–5.1)
RBC # BLD: 3.43 M/UL (ref 4–5.2)
SLIDE REVIEW: ABNORMAL
SODIUM BLD-SCNC: 135 MMOL/L (ref 136–145)
WBC # BLD: 8.6 K/UL (ref 4–11)

## 2022-04-07 PROCEDURE — 83735 ASSAY OF MAGNESIUM: CPT

## 2022-04-07 PROCEDURE — 99024 POSTOP FOLLOW-UP VISIT: CPT | Performed by: NURSE PRACTITIONER

## 2022-04-07 PROCEDURE — 6370000000 HC RX 637 (ALT 250 FOR IP): Performed by: THORACIC SURGERY (CARDIOTHORACIC VASCULAR SURGERY)

## 2022-04-07 PROCEDURE — 93005 ELECTROCARDIOGRAM TRACING: CPT | Performed by: NURSE PRACTITIONER

## 2022-04-07 PROCEDURE — 97535 SELF CARE MNGMENT TRAINING: CPT

## 2022-04-07 PROCEDURE — 97116 GAIT TRAINING THERAPY: CPT

## 2022-04-07 PROCEDURE — 2700000000 HC OXYGEN THERAPY PER DAY

## 2022-04-07 PROCEDURE — 6370000000 HC RX 637 (ALT 250 FOR IP): Performed by: NURSE PRACTITIONER

## 2022-04-07 PROCEDURE — 94669 MECHANICAL CHEST WALL OSCILL: CPT

## 2022-04-07 PROCEDURE — 97530 THERAPEUTIC ACTIVITIES: CPT

## 2022-04-07 PROCEDURE — 85027 COMPLETE CBC AUTOMATED: CPT

## 2022-04-07 PROCEDURE — 6360000002 HC RX W HCPCS: Performed by: THORACIC SURGERY (CARDIOTHORACIC VASCULAR SURGERY)

## 2022-04-07 PROCEDURE — 6360000002 HC RX W HCPCS: Performed by: NURSE PRACTITIONER

## 2022-04-07 PROCEDURE — P9045 ALBUMIN (HUMAN), 5%, 250 ML: HCPCS | Performed by: THORACIC SURGERY (CARDIOTHORACIC VASCULAR SURGERY)

## 2022-04-07 PROCEDURE — 80048 BASIC METABOLIC PNL TOTAL CA: CPT

## 2022-04-07 PROCEDURE — 6370000000 HC RX 637 (ALT 250 FOR IP): Performed by: STUDENT IN AN ORGANIZED HEALTH CARE EDUCATION/TRAINING PROGRAM

## 2022-04-07 PROCEDURE — 2140000000 HC CCU INTERMEDIATE R&B

## 2022-04-07 PROCEDURE — 97163 PT EVAL HIGH COMPLEX 45 MIN: CPT

## 2022-04-07 PROCEDURE — 93010 ELECTROCARDIOGRAM REPORT: CPT | Performed by: INTERNAL MEDICINE

## 2022-04-07 PROCEDURE — 97166 OT EVAL MOD COMPLEX 45 MIN: CPT

## 2022-04-07 PROCEDURE — 2580000003 HC RX 258: Performed by: THORACIC SURGERY (CARDIOTHORACIC VASCULAR SURGERY)

## 2022-04-07 PROCEDURE — 94761 N-INVAS EAR/PLS OXIMETRY MLT: CPT

## 2022-04-07 PROCEDURE — 94640 AIRWAY INHALATION TREATMENT: CPT

## 2022-04-07 PROCEDURE — 71045 X-RAY EXAM CHEST 1 VIEW: CPT

## 2022-04-07 PROCEDURE — 2580000003 HC RX 258: Performed by: NURSE PRACTITIONER

## 2022-04-07 RX ORDER — METHOCARBAMOL 500 MG/1
750 TABLET, FILM COATED ORAL 4 TIMES DAILY
Status: COMPLETED | OUTPATIENT
Start: 2022-04-07 | End: 2022-04-07

## 2022-04-07 RX ORDER — KETOROLAC TROMETHAMINE 30 MG/ML
15 INJECTION, SOLUTION INTRAMUSCULAR; INTRAVENOUS ONCE
Status: COMPLETED | OUTPATIENT
Start: 2022-04-07 | End: 2022-04-07

## 2022-04-07 RX ORDER — DIGOXIN 125 MCG
125 TABLET ORAL DAILY
Status: DISCONTINUED | OUTPATIENT
Start: 2022-04-07 | End: 2022-04-14 | Stop reason: HOSPADM

## 2022-04-07 RX ORDER — ACETAMINOPHEN 325 MG/1
650 TABLET ORAL EVERY 4 HOURS PRN
Status: DISCONTINUED | OUTPATIENT
Start: 2022-04-07 | End: 2022-04-14 | Stop reason: HOSPADM

## 2022-04-07 RX ADMIN — DIGOXIN 125 MCG: 125 TABLET ORAL at 08:47

## 2022-04-07 RX ADMIN — INSULIN GLARGINE 7 UNITS: 100 INJECTION, SOLUTION SUBCUTANEOUS at 20:24

## 2022-04-07 RX ADMIN — ALBUTEROL SULFATE 2.5 MG: 2.5 SOLUTION RESPIRATORY (INHALATION) at 19:07

## 2022-04-07 RX ADMIN — ALBUTEROL SULFATE 2.5 MG: 2.5 SOLUTION RESPIRATORY (INHALATION) at 15:51

## 2022-04-07 RX ADMIN — FUROSEMIDE 20 MG: 10 INJECTION, SOLUTION INTRAMUSCULAR; INTRAVENOUS at 23:57

## 2022-04-07 RX ADMIN — METHOCARBAMOL TABLETS 750 MG: 500 TABLET, COATED ORAL at 10:27

## 2022-04-07 RX ADMIN — RIVAROXABAN 20 MG: 20 TABLET, FILM COATED ORAL at 17:56

## 2022-04-07 RX ADMIN — SODIUM CHLORIDE 1.06 UNITS/HR: 9 INJECTION, SOLUTION INTRAVENOUS at 11:28

## 2022-04-07 RX ADMIN — VANCOMYCIN HYDROCHLORIDE 750 MG: 750 INJECTION, POWDER, LYOPHILIZED, FOR SOLUTION INTRAVENOUS at 20:26

## 2022-04-07 RX ADMIN — FUROSEMIDE 20 MG: 10 INJECTION, SOLUTION INTRAMUSCULAR; INTRAVENOUS at 05:37

## 2022-04-07 RX ADMIN — OXYCODONE 5 MG: 5 TABLET ORAL at 02:38

## 2022-04-07 RX ADMIN — MUPIROCIN: 20 OINTMENT TOPICAL at 08:53

## 2022-04-07 RX ADMIN — CEFAZOLIN SODIUM 1000 MG: 1 INJECTION, POWDER, FOR SOLUTION INTRAMUSCULAR; INTRAVENOUS at 21:48

## 2022-04-07 RX ADMIN — KETOROLAC TROMETHAMINE 15 MG: 30 INJECTION, SOLUTION INTRAMUSCULAR; INTRAVENOUS at 16:13

## 2022-04-07 RX ADMIN — DEXTROSE AND SODIUM CHLORIDE: 5; 450 INJECTION, SOLUTION INTRAVENOUS at 08:27

## 2022-04-07 RX ADMIN — CEFAZOLIN SODIUM 1000 MG: 1 INJECTION, POWDER, FOR SOLUTION INTRAMUSCULAR; INTRAVENOUS at 04:07

## 2022-04-07 RX ADMIN — VANCOMYCIN HYDROCHLORIDE 750 MG: 750 INJECTION, POWDER, LYOPHILIZED, FOR SOLUTION INTRAVENOUS at 08:47

## 2022-04-07 RX ADMIN — ATORVASTATIN CALCIUM 40 MG: 40 TABLET, FILM COATED ORAL at 20:08

## 2022-04-07 RX ADMIN — MORPHINE SULFATE 4 MG: 4 INJECTION, SOLUTION INTRAMUSCULAR; INTRAVENOUS at 05:13

## 2022-04-07 RX ADMIN — FAMOTIDINE 20 MG: 20 TABLET, FILM COATED ORAL at 20:08

## 2022-04-07 RX ADMIN — MUPIROCIN: 20 OINTMENT TOPICAL at 20:11

## 2022-04-07 RX ADMIN — ALBUTEROL SULFATE 2.5 MG: 2.5 SOLUTION RESPIRATORY (INHALATION) at 08:08

## 2022-04-07 RX ADMIN — METHOCARBAMOL TABLETS 750 MG: 500 TABLET, COATED ORAL at 17:56

## 2022-04-07 RX ADMIN — CEFAZOLIN SODIUM 1000 MG: 1 INJECTION, POWDER, FOR SOLUTION INTRAMUSCULAR; INTRAVENOUS at 12:52

## 2022-04-07 RX ADMIN — SODIUM CHLORIDE, PRESERVATIVE FREE 10 ML: 5 INJECTION INTRAVENOUS at 20:08

## 2022-04-07 RX ADMIN — FUROSEMIDE 20 MG: 10 INJECTION, SOLUTION INTRAMUSCULAR; INTRAVENOUS at 12:51

## 2022-04-07 RX ADMIN — ACETAMINOPHEN 650 MG: 325 TABLET ORAL at 13:01

## 2022-04-07 RX ADMIN — FAMOTIDINE 20 MG: 20 TABLET, FILM COATED ORAL at 08:47

## 2022-04-07 RX ADMIN — Medication 15 ML: at 10:27

## 2022-04-07 RX ADMIN — ASPIRIN 81 MG: 81 TABLET, COATED ORAL at 08:49

## 2022-04-07 RX ADMIN — ALBUTEROL SULFATE 2.5 MG: 2.5 SOLUTION RESPIRATORY (INHALATION) at 12:11

## 2022-04-07 RX ADMIN — METOPROLOL TARTRATE 12.5 MG: 25 TABLET, FILM COATED ORAL at 08:47

## 2022-04-07 RX ADMIN — Medication 15 ML: at 20:11

## 2022-04-07 RX ADMIN — METHOCARBAMOL TABLETS 750 MG: 500 TABLET, COATED ORAL at 14:44

## 2022-04-07 RX ADMIN — SODIUM CHLORIDE, PRESERVATIVE FREE 10 ML: 5 INJECTION INTRAVENOUS at 10:28

## 2022-04-07 RX ADMIN — ALBUMIN (HUMAN) 12.5 G: 12.5 INJECTION, SOLUTION INTRAVENOUS at 02:08

## 2022-04-07 RX ADMIN — METHOCARBAMOL TABLETS 750 MG: 500 TABLET, COATED ORAL at 20:07

## 2022-04-07 ASSESSMENT — PAIN SCALES - GENERAL
PAINLEVEL_OUTOF10: 4
PAINLEVEL_OUTOF10: 5
PAINLEVEL_OUTOF10: 4
PAINLEVEL_OUTOF10: 7
PAINLEVEL_OUTOF10: 4
PAINLEVEL_OUTOF10: 5

## 2022-04-07 ASSESSMENT — PAIN DESCRIPTION - ORIENTATION
ORIENTATION: RIGHT
ORIENTATION: RIGHT

## 2022-04-07 ASSESSMENT — PAIN DESCRIPTION - LOCATION
LOCATION: CHEST
LOCATION: CHEST;SHOULDER
LOCATION: CHEST

## 2022-04-07 ASSESSMENT — PAIN - FUNCTIONAL ASSESSMENT: PAIN_FUNCTIONAL_ASSESSMENT: PREVENTS OR INTERFERES SOME ACTIVE ACTIVITIES AND ADLS

## 2022-04-07 ASSESSMENT — PAIN DESCRIPTION - PAIN TYPE
TYPE: SURGICAL PAIN

## 2022-04-07 ASSESSMENT — PAIN DESCRIPTION - DESCRIPTORS: DESCRIPTORS: DISCOMFORT

## 2022-04-07 ASSESSMENT — PAIN DESCRIPTION - FREQUENCY: FREQUENCY: CONTINUOUS

## 2022-04-07 ASSESSMENT — PAIN DESCRIPTION - ONSET: ONSET: ON-GOING

## 2022-04-07 NOTE — PLAN OF CARE
Problem: HEMODYNAMIC STATUS  Goal: Patient has stable vital signs and fluid balance  4/7/2022 1357 by Denzel Orozco RN  Outcome: Ongoing   Patient's EF (Ejection Fraction) is greater than 40%    Heart Failure Medications:  Diuretics[de-identified] Furosemide    (One of the following REQUIRED for EF <40%/SYSTOLIC FAILURE but MAY be used in EF% >40%/DIASTOLIC FAILURE)        ACE[de-identified] None        ARB[de-identified] None         ARNI[de-identified] None    (Beta Blockers)  NON- Evidenced Based Beta Blocker (for EF% >40%/DIASTOLIC FAILURE): Metoprolol TARTrate- Lopressor    Evidenced Based Beta Blocker::(REQUIRED for EF% <40%/SYSTOLIC FAILURE) Metoprolol SUCCinate- Toprol XL  . .................................................................................................................................................. Patient's Last Weight: 46.2kg obtained by standing scale. Difference in weight is 6 pounds less than last documented weight. Intake/Output Summary (Last 24 hours) at 4/7/2022 1358  Last data filed at 4/7/2022 1300  Gross per 24 hour   Intake 1837.18 ml   Output 1765 ml   Net 72.18 ml       Comorbidities Reviewed Yes  Patient has a past medical history of Varicose vein.     >> For CHF and Comorbidity Education Time and Topics, please see Education Tab. Progressive Mobility Assessment:  What is this patient's Current Level of Mobility?: Ambulatory- with Assistance  How was this patient Mobilized today?: Edge of Bed, Up to Chair, and Up in Room                 With Whom? Nurse, PT, and OT                 Level of Difficulty/Assistance: 2x Assist     Pt is currently on 1 L O2. Pt with nonpitting lower extremity edema.  Patient's weights and intake/output reviewed:      Patient and/or Family's stated Goal of Care this Admission: reduce shortness of breath, increase activity tolerance, better understand heart failure and disease management, be more comfortable, and reduce lower extremity edema prior to discharge

## 2022-04-07 NOTE — PROGRESS NOTES
CVTS Cardiothoracic Progress Note:                                CC:  Post op follow up     Surgery: 4/6 MINIMAL ACCESS MITRAL VALVE REPAIR USING HUTCHINSON 38MM PHYSIO II ANNULOPLASTY RING, RIGHT SIDED MAZE, LEFT ATERIAL APPENDAGE CLIP PLACEMENT, CRYO NERVE ABLATION, ON PUMP, WITH BILATERAL PECTORALIS BLOCKS, XAVIER, AND TEMPORARY PACING [facility] course:  4/7 Hemodynamically stable overnight. On low dose levo for BP support.        Past Medical History:   Diagnosis Date    Varicose vein         Past Surgical History:   Procedure Laterality Date    MITRAL VALVE REPAIR N/A 4/6/2022    MINIMAL ACCESS MITRAL VALVE REPAIR USING HUTCHINSON 38MM PHYSIO II ANNULOPLASTY RING, RIGHT SIDED MAZE, LEFT ATERIAL APPENDAGE CLIP PLACEMENT, CRYO NERVE ABLATION, ON PUMP, WITH BILATERAL PECTORALIS BLOCKS, XAVIER, AND TEMPORARY PACING WIRE PLACEMENT performed by Krysta Garg MD at [facility] as of 03/30/2022 - Fully Reviewed 03/30/2022   Allergen Reaction Noted    D[employer] [lactase-lactobacillus]  03/30/2022    Eggs or egg-derived products  03/30/2022        Patient Active Problem List   Diagnosis    Atrial fibrillation with RVR ([address]    New onset atrial fibrillation (HCC)    Decompensated heart failure (HCC)    Pleural effusion, bilateral    LAFB (left anterior fascicular block)    Cardiomegaly    Pansystolic murmur    Mitral valve insufficiency        Vital Signs: BP (!) 124/59   Pulse 95   Temp 98.8 °F (37.1 °C) (Bladder)   Resp 18   Ht 5' (1.524 m)   Wt 101 lb 13.6 oz (46.2 kg)   SpO2 91%   BMI 19.89 kg/m²  O2 Flow Rate (L/min): 4 L/min     Admission Weight: Weight: 107 lb (48.5 kg)    Weight on 4/6 (43.6 kg) Pre-op   Weight on 4/7 (46.2 kg) +2.6 kg     Intake/Output:     Intake/Output Summary (Last 24 hours) at 4/7/2022 0947  Last data filed at 4/7/2022 0700  Gross per 24 hour   Intake 4302.18 ml   Output 1645 ml   Net 2657.18 ml        GTTS: insulin, levo at 1 mcg/min     Extubation Time:  Transition Time:    LABORATORY DATA:     CBC:   Recent Labs     04/06/22  0640 04/06/22  0828 04/06/22  1349 04/06/22  1355 04/07/22  0410   WBC 7.0  --   --  14.3* 8.6   HGB 13.9   < > 13.1 13.0 10.9*   HCT 41.2  --   --  39.6 32.6*   MCV 95.1  --   --  94.5 94.9     --   --  114* 89*    < > = values in this interval not displayed. BMP:   Recent Labs     04/06/22  0640 04/06/22  0640 04/06/22  1355 04/06/22  2240 04/07/22  0410   *  --  137  --  135*   K 4.5   < > 4.5 4.5 5.0   CL 93*  --  99  --  100   CO2 28  --  29  --  28   BUN 29*  --  22*  --  22*   CREATININE 0.7  --  0.7  --  0.6    < > = values in this interval not displayed. MG:    Recent Labs     04/06/22  0640 04/06/22  1355 04/07/22  0410   MG 2.10 3.00* 2.20      Cardiac Enzymes:   Recent Labs     04/05/22  0457   TROPONINI 0.01     PT/INR:   Recent Labs     04/06/22  0640   PROTIME 13.9*   INR 1.22*     CXR: 4/7/22      FINDINGS:   The heart size is indeterminate but appears to be enlarged based upon the   left ventricular contour.  The right heart border is obscured with extensive   airspace disease throughout the right lower lobe and right middle lobe.  The   left lung demonstrates only minimal atelectasis left lower lobe retrocardiac   region.  Right chest tube extends well over the apex and then descends   medially.  Left jugular sheath is in satisfactory position.           Impression   Moderate airspace disease and opacity throughout the right lower lung   obscuring the right heart border and diaphragm.  Chest tube extends well over   the apex and descends medially.      ________________________________________________________________________    Subjective:   Dietary Intake: needs improvement    no Nausea   Pain Control: needs improvement   Complaints: expected post operative pain   Bowels: no BM     Objective:   General appearance: resting in bed, in nad   Lungs: diminished bilateral bases Heart: S1S2 normal, irregularly irregular; controlled afib on monitor  Chest: symmetrical expansion with inspiration and expirations; no rocking of sternum noted   Abdomen: soft, non-tender  Bowel sounds: normoactive   Kidneys: -425-938=7092 ml over 24 hours; Cr 0.6  Wound/Incisions: Right chest wall incision with dressing CDI; pacing wires out this AM   Extremities: BLE pulses present; trace swelling noted in BLE  Neurological: alert, oriented and grossly non-focal   Chest tubes/Drains: Chest tube # 1 with -940=358 ml serosanguinous drainage in 24 hours; no airleak noted      Assessment:   Post-op: 1 days. Condition: In stable condition. Plan:   1. Cardiovascular: s/p MVr with annuloplasty ring, right sided MAZE, ISAAC clip   ASA, statin, BB  Levo for soft BPs- wean as able   Chronic, persistent atrial fibrillation with gigantic left atrium- rate controlled. On dig, BB, xarelto     2. Pulmonary:   Satisfactory oxygen saturations on RA   Expansion measures- IS, acapella, oobtc, ambulation when able   Albuterol every 4 hours while awake     3. Neurology:   Post operative pain- PRN tylenol, oxy. Scheduled robaxin     4. Nephrology:   Weight up 2.6 kg from pre-op  Adequate 24 hour UOP with Cr of 0.6  Diurese as tolerated- Lasix 20 mg IV every 6 hours     5. Endocrinology:   Glucose stable on insulin gtt. Will d/c   No hx of DM. A1C 5.6 on 4/6    6. Hematology:   Acute blood loss anemia- H&H stable at 10.9/32. 6. Monitor   Post operative thrombocytopenia- plts 89. Monitor     7. Microbiology:   No issues at present     8. Nutrition:   ADAT    9. Labs:   AM labs and imaging reviewed as above     10. Post-op Drains/Wires: TPWs out this morning. Keeping CT, f/c for now   Left IJ CVC (4/6)   Left brachial caesar (4/6)     11. D/C Goals: CM following. Will await PT/OT recs. Pt lives at home alone PTA and does not have any family members that would be able to stay with her following surgery.      12. Continue post-op care of patient in the ICU    GI prophylaxis: pepcid   DVT prophylaxis: xarelto   ________________________________________________________________________  Reno CHARLES Rodriguez CNP  4/7/2022  9:47 AM

## 2022-04-07 NOTE — PROGRESS NOTES
1800 Lasix dose held d/t low b/p, 96/56 per b/p cuff. Kitty pressure also low. [place] [facility] d/t patient left handed and continues to bend arm. Remains on Levo. Up in chair for dinner. Pain better at this time. Received Toradol dose at 1615 per order.   Maribel Chen RN

## 2022-04-07 NOTE — PLAN OF CARE
Patient's EF (Ejection Fraction) is greater than 40%    Heart Failure Medications:   Diuretics[de-identified] Furosemide, Torsemide, Spironolactone, Metalozone, Other and None     (One of the following REQUIRED for EF </= 40%/SYSTOLIC FAILURE but MAY be used in EF% >40%/DIASTOLIC FAILURE)        ACE[de-identified] None        ARB[de-identified] None         ARNI[de-identified] None    (Beta Blockers)   NON- Evidenced Based Beta Blocker (for EF% >40%/DIASTOLIC FAILURE): None     Evidenced Based Beta Blocker::(REQUIRED for EF% <40%/SYSTOLIC FAILURE) None  . .................................................................................................................................................. Patient's weights and intake/output reviewed: Yes    Patient's Last Weight: 46.2 kg obtained by standing scale. Difference of lbs more than last documented weight. Intake/Output Summary (Last 24 hours) at 4/7/2022 0653  Last data filed at 4/7/2022 1387  Gross per 24 hour   Intake 4182.18 ml   Output 1540 ml   Net 2642.18 ml       Comorbidities Reviewed Yes    Patient has a past medical history of Varicose vein. >>For CHF and Comorbidity documentation on Education Time and Topics, please see Education Tab    Progressive Mobility Assessment:  What is this patient's Current Level of Mobility?: Ambulatory- with Assistance  How was this patient Mobilized today?: Edge of Bed, Up to Chair, Bedside Commode,  Up to Toilet/Shower, Up in Room, Up in [place], Unable to Mobilize and Patient Refuses to Mobilize, ambulated  ft                 With Whom? Nurse, PCA, PT, OT and Self                 Level of Difficulty/Assistance: 1x Assist     Pt up in chair at this time on room air. Pt denies shortness of breath. Pt without lower extremity edema.      Patient and/or Family's stated Goal of Care this Admission: reduce shortness of breath, increase activity tolerance, better understand heart failure and disease management, be more comfortable and reduce lower extremity edema prior to discharge    Jamshid Stone RN        :

## 2022-04-07 NOTE — PLAN OF CARE
Problem: OXYGENATION/RESPIRATORY FUNCTION  Goal: Patient will maintain patent airway  Outcome: Ongoing   On 1 L 02 to maintain sat above 90%  Goal: Patient will achieve/maintain normal respiratory rate/effort  Description: Respiratory rate and effort will be within normal limits for the patient  Outcome: Ongoing  Congested cough, IS & acapella in use. Patient states she has a chronic cough at home & uses cough drops. Cough drop usage okayed by CTS NP.      Problem: HEMODYNAMIC STATUS  Goal: Patient has stable vital signs and fluid balance  Outcome: Ongoing  Monitoring labs     Problem: FLUID AND ELECTROLYTE IMBALANCE  Goal: Fluid and electrolyte balance are achieved/maintained  Outcome: Ongoing  Potassium & mag slightly elevated, medications held today per protocol     Problem: ACTIVITY INTOLERANCE/IMPAIRED MOBILITY  Goal: Mobility/activity is maintained at optimum level for patient  Outcome: Ongoing   Up to chair & ambulating in room, POD #1  Problem: Falls - Risk of:  Goal: Will remain free from falls  Description: Will remain free from falls  Outcome: Ongoing     Problem: Pain:  Goal: Pain level will decrease  Description: Pain level will decrease  Outcome: Ongoing  Pain management in progress  Goal: Control of acute pain  Description: Control of acute pain  Outcome: Ongoing   Robaxin & Tylenol given per orders for pain management

## 2022-04-07 NOTE — PROGRESS NOTES
Criteria met per clinical pathway to remove epicardial pacing wires & MD order received. Patient was placed in chair at 0500 and put back in the bed at 0600. Procedure explained to patient. Pacing wire site within normal limits. Cleansed site with Chloroprep. Ventricular pacing wires removed per policy without difficulty. Dry sterile dressing applied. Vital signs taken every 15 minutes X 2, every 30 minutes X 1, and every hour X 1 recorded in Doc Flowsheets. Patient tolerated procedure well, heart tones easily auscultated, oxygen saturation within normal limits. No signs/symtoms of cardiac tamponade.       Willam Alfonso RN

## 2022-04-07 NOTE — CONSULTS
RD received consult for diet education. Pt not appropriate for education today d/t poor PO intake and not feeling very well. Will follow up w/ diet education when medically appropriate. Dietitians following with nutrition assessment and recommendations in RD progress notes. Will continue to monitor per nutrition standards of care.      Danny Cole MS, RD, LD on 4/7/2022 at 11:49 AM  Office: [phone]

## 2022-04-07 NOTE — PROGRESS NOTES
Shift: [id number]    Nursing assessment at handoff  stable    Rhythm changes Atrial Fibrillation 60-70s    Rhythm Intervention V wires set up, but disconnected per Dr Jag Ventura, patient HR fluctuated during the night from german in the 50's to the 80's pt asymptomatic to changes, CI and BP unchanged with variations. Drop in Urinary Output Yes to under 30ml/hr remaining 250 ml of albumin administered over an hour, urine increased to 30 ml an hour and morning dose of lasix IVP administered. Changes to drips? N/A    POD 1 ONLY: Pacing Wires Removed: [x]Yes           [] NO        [] Platelets < 62,135        [] Arrhythmia        [] Bradycardia        [] Valve Replacement         [] Pacing for Cardiac Index    LDA Insertion Date Date Changed (if needed) Discontinued Date   Art line 4/6     Central Line 4/6     ET Tube 4/6 4/6   Panchal 4/6     Chest Tube 4/6     Pacing Wires 4/6 4/7     Surgery, return to OR no     Hospital Course:  POD# 0  Admitted to  at 4133 s/p MVR, R partial maze and ISAAC clip. Precedex at 0.3, Albumin x1. VSS. CTx1. 4L NC. Temp V wires, Afib 60-70s    POD #0 Nights: Pt remained stable overnight, HR variations from 40's-80's as documented above, pt asymptomatic and hemodynamically stable. Precedex weaned off and levophed titrated to 1 mcg/min. Pt O2 weaned to room air and she tolerated.      Changes in O2 requirements Oxygen Therapy  SpO2: 100 %  Pulse Oximeter Device Mode: Continuous  Pulse Oximeter Device Location: Finger  O2 Device: None (Room air)  O2 Flow Rate (L/min): 4 L/min     Most recent vitals /61   Pulse 63   Temp 97.9 °F (36.6 °C) (Bladder)   Resp 15   Ht 5' (1.524 m)   Wt 101 lb 13.6 oz (46.2 kg)   SpO2 100%   BMI 19.89 kg/m²       Admission weight Weight: 107 lb (48.5 kg)     Today's weight   Wt Readings from Last 1 Encounters:   04/07/22 101 lb 13.6 oz (46.2 kg)         Lab Data:  CBC:   Recent Labs     04/07/22  0410   WBC 8.6   HGB 10.9*   HCT 32.6*   MCV 94.9   PLT 89*     BMP:    Recent Labs     04/07/22  0410   *   K 5.0   CO2 28   BUN 22*   CREATININE 0.6     LIVR:   Recent Labs     04/06/22  0640   AST 27   ALT 22     PT/INR:   Recent Labs     04/06/22  0640   PROT 7.3   INR 1.22*     APTT: No results for input(s): APTT in the last 72 hours.     Drip rates at handoff:    dextrose 5 % and 0.45 % NaCl 75 mL/hr at 04/06/22 1400    sodium chloride      niCARdipine      insulin 1.4 Units/hr (04/07/22 0528)    dextrose      norepinephrine 2.133 mcg/min (04/06/22 2000)    dexmedetomidine Stopped (04/06/22 2100)       Arjun Hdez RN

## 2022-04-07 NOTE — PROGRESS NOTES
Shift: 0700/1900    Nursing assessment at handoff  stable    Rhythm changes Atrial Fibrillation/ controlled    Rhythm Intervention on Lanoxin & ASA    Drop in Urinary Output no , on Lasix IVP, 1100 ml out today  Changes to drips? Levo - continues for low SB/P  POD 1 ONLY: Pacing Wires Removed: [x]Yes           [] NO        [] Platelets < 76,088        [] Arrhythmia        [] Bradycardia        [] Valve Replacement         [] Pacing for Cardiac Index    LDA Insertion Date Date Changed (if needed) Discontinued Date   Art line      Central Line      ET Tube      Panchal      Chest Tube      Leg Drain      Pacing Wires        Surgery, return to OR no     Hospital Course:  POD# 1    Changes in O2 requirements Oxygen Therapy  SpO2: 91 %  Pulse Oximeter Device Mode: Continuous  Pulse Oximeter Device Location: Finger  O2 Device: Nasal cannula  O2 Flow Rate (L/min): 1 L/min     Most recent vitals /71   Pulse 90   Temp 98.9 °F (37.2 °C) (Bladder)   Resp 16   Ht 5' (1.524 m)   Wt 101 lb 13.6 oz (46.2 kg)   SpO2 91%   BMI 19.89 kg/m²       Admission weight Weight: 107 lb (48.5 kg)     Today's weight   Wt Readings from Last 1 Encounters:   04/07/22 101 lb 13.6 oz (46.2 kg)         Lab Data:  CBC:   Recent Labs     04/07/22  0410   WBC 8.6   HGB 10.9*   HCT 32.6*   MCV 94.9   PLT 89*     BMP:    Recent Labs     04/07/22  0410   *   K 5.0   CO2 28   BUN 22*   CREATININE 0.6     LIVR:   Recent Labs     04/06/22  0640   AST 27   ALT 22     PT/INR:   Recent Labs     04/06/22  0640   PROT 7.3   INR 1.22*     APTT: No results for input(s): APTT in the last 72 hours.     Drip rates at handoff:    sodium chloride      dextrose      norepinephrine 2 mcg/min (04/07/22 1102)         Electronically signed by Yuliana Quiñonez RN on 4/7/22 at 6:58 PM EDT

## 2022-04-07 NOTE — PROGRESS NOTES
RN spoke with Iker Montes NP via phone as patient asking to have [place] cough drops & NP gave okay. Patient brought cough drop minis from home & has at bedside.  Caitie Perez RN

## 2022-04-07 NOTE — PROGRESS NOTES
Physical Therapy    Facility/Department: [facility] C2 CARD TELEMETRY  Initial Assessment and treatment    NAME: Cassie Morejon  : 1955  MRN: [id number]    Date of Service: 2022    Discharge Recommendations:  24 hour supervision or assist   PT Equipment Recommendations  Equipment Needed:  (CTA for need for walker, anticipate no need)    Assessment   Body structures, Functions, Activity limitations: Decreased functional mobility ; Decreased endurance; Increased pain  Assessment: Pt referred for PT evaluation during current hospital stay with a diagnosis of A fib with RVR, s/p minimally invasive mitral valve repair on 22. Pt is currently functioning below baseline, requiring Yrn for bed mobility, and CGA for transfers and gait x 3 ft without AD. Lines preventing further ambulation this date. Pt would benefit from skilled acute PT to address deficits. Recommend home with 24 hr sup/assist at DC from acute setting. Treatment Diagnosis: impaired mobility  Prognosis: Good  Decision Making: High Complexity  PT Education: Goals;Gait Training;PT Role;Disease Specific Education;Plan of Care; Functional Mobility Training;Home Exercise Program;Transfer Training  Patient Education: Pt verbalized understanding of importance of OOB activity while in acute setting  Barriers to Learning: no  REQUIRES PT FOLLOW UP: Yes  Activity Tolerance  Activity Tolerance: Patient Tolerated treatment well  Activity Tolerance: /58 on entry per A line; HR 89; SpO2 92% on RA; on exit with pt sitting up in chair A line /44       Patient Diagnosis(es): The encounter diagnosis was Atrial fibrillation with RVR ([address] has a past medical history of Varicose vein. has a past surgical history that includes Tonsillectomy and Mitral valve repair (N/A, 2022).     Restrictions  Restrictions/Precautions  Restrictions/Precautions: Fall Risk,General Precautions  Position Activity Restriction  Other position/activity restrictions: progressive ambulation, turk, chest tube. Sternal precuations in chart, per RN no sternal precautions d/t minimally invasive sx  Vision/Hearing  Vision: Impaired  Vision Exceptions: Wears glasses for distance;Wears glasses for reading  Hearing: Within functional limits     Subjective  General  Chart Reviewed: Yes  Patient assessed for rehabilitation services?: Yes  Response To Previous Treatment: Not applicable  Family / Caregiver Present: No  Referring Practitioner: Brooks Wisdom MD  Referral Date : 04/06/22  Diagnosis: MINIMAL ACCESS MITRAL VALVE REPAIR USING HUTCHINSON 38MM PHYSIO II ANNULOPLASTY RING, RIGHT SIDED MAZE, LEFT ATERIAL APPENDAGE CLIP PLACEMENT, CRYO NERVE ABLATION, ON PUMP, WITH BILATERAL PECTORALIS BLOCKS, XAVIER, AND TEMPORARY PACING WIRE PLACEMENT  Follows Commands: Within Functional Limits  General Comment  Comments: Pt resting in bed upon entry, RN cleared pt for therapy  Subjective  Subjective: Pt agreeable to PT  Pain Screening  Patient Currently in Pain: Yes  Pain Assessment  Pain Assessment: 0-10  Pain Level: 5  Pain Type: Surgical pain  Pain Location: Chest  Pain Orientation: Right  Non-Pharmaceutical Pain Intervention(s): Repositioned; Ambulation/Increased Activity; Emotional support; Therapeutic presence  Vital Signs  Patient Currently in Pain: Yes  Pre Treatment Pain Screening  Intervention List: Patient able to continue with treatment    Orientation  Orientation  Overall Orientation Status: Within Normal Limits  Social/Functional History  Social/Functional History  Lives With: Alone  Type of Home: House (condo)  Home Layout: Two level,Laundry in basement (ranch with basement)  Home Access: Stairs to enter with rails  Entrance Stairs - Number of Steps: 3  Bathroom Shower/Tub: Tub/Shower unit,Walk-in shower  Bathroom Toilet: Standard  Bathroom Equipment: Grab bars in shower (grab bar in [facility])  ADL Assistance: Independent  Homemaking Assistance: Independent  Ambulation Assistance: Independent  Transfer Assistance: Independent  Active : Yes  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 Pt ambulates without AD. Nephew has offered for pt to stay with him and his family during recovery, but pt would prefer not to take his offer if possible. Nephew lives in a ranch home.          Objective          PROM RLE (degrees)  RLE PROM: WNL  AROM RLE (degrees)  RLE AROM: WNL  PROM LLE (degrees)  LLE PROM: WNL  AROM LLE (degrees)  LLE AROM : WNL  Strength RLE  Strength RLE: WNL  Comment: grossly 4+ to 5/5 throughout  Strength LLE  Strength LLE: WNL  Comment: grossly 4+ to 5/5 throughout     Sensation  Overall Sensation Status: Impaired  Light Touch: Partial deficits in the RUE  Additional Comments: pt reports new numbness R thumb since surgery  Bed mobility  Supine to Sit: Minimal assistance  Sit to Supine: Unable to assess (up in chair at EOS)  Scooting: Stand by assistance (to EOB)  Transfers  Sit to Stand: Contact guard assistance  Stand to sit: Contact guard assistance  Bed to Chair: Contact guard assistance (without AD)  Ambulation  Ambulation?: Yes  Ambulation 1  Surface: level tile  Device: No Device  Assistance: Contact guard assistance  Quality of Gait: slow but steady, lines, tubes, wires limiting distance  Distance: 3 ft from bed to chair  Stairs/Curb  Stairs?: No     Balance  Posture: Good  Sitting - Static: Good  Sitting - Dynamic: Good  Standing - Static: Good  Standing - Dynamic: Good;-  Exercises  Gluteal Sets: x 10 B  Hip Abduction: x 10 B  Knee Long Arc Quad: x 10 B  Ankle Pumps: x 10 B     Plan   Plan  Times per week: 5-7  Current Treatment Recommendations: Strengthening,Home Exercise Program,ROM,Balance Training,Endurance Training,Functional Mobility Training,Transfer Training,Gait Training,Stair training  Safety Devices  Type of devices: All fall risk precautions in place,Left in chair,Call light within reach,Nurse notified,Gait belt,Patient at risk for falls      AM-PAC Score  AM-PAC Inpatient Mobility Raw Score : 18 (04/07/22 1025)  AM-PAC Inpatient T-Scale Score : 43.63 (04/07/22 1025)  Mobility Inpatient CMS 0-100% Score: 46.58 (04/07/22 1025)  Mobility Inpatient CMS G-Code Modifier : CK (04/07/22 1025)          Goals  Short term goals  Time Frame for Short term goals: 4/13/22 unless noted  Short term goal 1: Pt will perform bed mobility with supervision by 4/12 22  Short term goal 2: Pt will perform transfers with supervision  Short term goal 3: Pt will ambulate 150 ft with LRAD or no AD and supervision  Short term goal 4: Pt will negotiate 2 stairs with 1 handrail and CGA  Patient Goals   Patient goals : \"to go home\"       Therapy Time   Individual Concurrent Group Co-treatment   Time In 0923         Time Out 0958         Minutes 35         Timed Code Treatment Minutes: 25 Minutes    If pt is discharged prior to next therapy session, this note will serve as discharge summary.     Gertrude Martínez, PT

## 2022-04-07 NOTE — CARE COORDINATION
Chart reviewed by CM. Triggered by LOS - day # 8. Patient being followed by internal medicine and cardiothoracic. Current discharge plan pending at this time. Patient s/p MVR yesterday. PT/OT to work with patient today to assist in [place] [id number] discharge needs. Pt IPTA and resides alone. CM team will continue to follow for therapy recs and to coordinate discharge plan.

## 2022-04-07 NOTE — PROGRESS NOTES
Occupational Therapy   Occupational Therapy Initial Assessment  Date: 2022   Patient Name: Alex Hernandez  MRN: [id number]     : 1955    Date of Service: 2022    Discharge Recommendations:  24 hour supervision or assist,Home with Home health OT  OT Equipment Recommendations  Equipment Needed: Yes  Mobility Devices: ADL Assistive Devices  ADL Assistive Devices: Shower Chair with back    Assessment   Performance deficits / Impairments: Decreased functional mobility ; Decreased balance;Decreased ADL status; Decreased endurance;Decreased high-level IADLs  Assessment: Pt tolerated OT evaluation well. At baseline pt lives alone, independent with I/ADLs, ambulates without AD. Pt currently requiring Min-CGA with simple transfers, CGA ambulation without AD, CGA-SBA with simple ADLs. Pt is currently below baseline level of occupational performance. Recommend continued OT services to increase safety and independence with ADLs and functional transfers. Anticipate pt will be safe to return home with 24/7 supervision/assist and HHOT services upon discharge. Will continue assess discharge needs as pt progresses during acute care stay. Prognosis: Good  Decision Making: Medium Complexity    OT Education: OT Role;Plan of Care;Energy Conservation; ADL Adaptive Strategies;Transfer Training  Patient Education: home safety concerns, OT discharge recommendations, bed mobility, safety with transfers, importance of OOB activity, compensatory LB dressing  Barriers to Learning: pt demonstrates and verbalizes understanding. REQUIRES OT FOLLOW UP: Yes    Activity Tolerance  Activity Tolerance: Patient Tolerated treatment well  Activity Tolerance: Vitals seated in chair following therapy session: 103/44, 91% SpO2 on RA, 85bpm  Safety Devices  Safety Devices in place: Yes  Type of devices: Nurse notified;Call light within reach; Left in chair;Gait belt           Patient Diagnosis(es): The encounter diagnosis was Atrial fibrillation with RVR ([facility] [address] has a past medical history of Varicose vein. has a past surgical history that includes Tonsillectomy and Mitral valve repair (N/A, 4/6/2022). Restrictions  Restrictions/Precautions  Restrictions/Precautions: Fall Risk,General Precautions  Position Activity Restriction  Other position/activity restrictions: progressive ambulation, turk, chest tube. Sternal precuations in chart, per RN no sternal precautions d/t minimally invasive sx    Subjective   General  Chart Reviewed: Yes  Patient assessed for rehabilitation services?: Yes  Family / Caregiver Present: No    Diagnosis: Afib with RVR, chest pain, pulmonary edema s/p minimally invasive MVR, partial R MAZE, ISAAC clip on 4/6 by Dr. Tricia Driver    Subjective  Subjective: Pt supine in bed upon therapy arrival. Pt reports pain to chest and fatigue, but agreeble to OT session  General Comment  Comments: RN agreeable to OT session. Patient Currently in Pain: Yes  Pain Assessment  Pain Assessment: 0-10  Pain Level: 5  Patient's Stated Pain Goal: 2  Pain Type: Surgical pain  Pain Location: Chest  Pain Orientation: Right  Pain Descriptors: Discomfort  Pain Frequency: Continuous  Pain Onset: On-going  Functional Pain Assessment: Prevents or interferes some active activities and ADLs  Non-Pharmaceutical Pain Intervention(s): Repositioned; Ambulation/Increased Activity; Emotional support; Therapeutic presence  Vital Signs  Pulse: 95  BP: (!) 124/59  Patient Currently in Pain: Yes     Social/Functional History  Social/Functional History  Lives With: Alone  Type of Home: House (condo)  Home Layout: Two level,Laundry in basement (ranch with basement)  Home Access: Stairs to enter with rails  Entrance Stairs - Number of Steps: 3  Bathroom Shower/Tub: Tub/Shower unit,Walk-in shower  Bathroom Toilet: Standard  Bathroom Equipment: Grab bars in shower (grab bar in [employer])  ADL Assistance: Independent  Homemaking Assistance: Independent  Ambulation Assistance: Independent  Transfer Assistance: Independent  Active : Yes  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 Pt ambulates without AD. Nephew has offered for pt to stay with him and his family during recovery, but pt would prefer not to take his offer if possible. Nephew lives in a ranch home. Objective   Vision: Impaired  Vision Exceptions: Wears glasses for distance;Wears glasses for reading  Hearing: Within functional limits      Orientation  Overall Orientation Status: Within Functional Limits        Balance  Sitting Balance: Independent  Standing Balance: Contact guard assistance    Standing Balance  Time: 30 seconds  Activity: static standing, ambulation 5 feet EOB to chair  Comment: CGA without AD    Functional Mobility  Functional - Mobility Device: No device  Activity: Other  Assist Level: Contact guard assistance  Functional Mobility Comments: 5 feet CGA without AD    ADL  Feeding: Independent  Grooming: Setup (seated to wash hands/face)  LE Dressing: Contact guard assistance  Toileting: Contact guard assistance     Tone RUE  RUE Tone: Normotonic  Tone LUE  LUE Tone: Normotonic  Coordination  Movements Are Fluid And Coordinated: Yes        Bed mobility  Supine to Sit: Minimal assistance     Transfers  Stand Step Transfers: Contact guard assistance  Sit to stand: Contact guard assistance  Stand to sit: Contact guard assistance     Vision - Basic Assessment  Prior Vision: Wears glasses all the time  Visual History: No significant visual history  Patient Visual Report: No visual complaint reported. Visual Field Cut: No  Oculo Motor Control:  WNL     Cognition  Overall Cognitive Status: WFL           Sensation  Overall Sensation Status: Impaired  Light Touch: Partial deficits in the RUE  Additional Comments: pt reports new numbness R thumb since surgery          LUE AROM (degrees)  LUE AROM : WFL  RUE AROM (degrees)  RUE AROM : WFL     LUE Strength  Gross LUE Strength: WFL  L Hand General: 5/5  RUE Strength  Gross RUE Strength: WFL  R Hand General: 5/5                   Plan   Plan  Times per week: 4-6x/week  Current Treatment Recommendations: Pain Management,Positioning,Gait Training,Safety Education & Training,Balance Training,Patient/Caregiver Education & Training,Self-Care / ADL,Functional Mobility Training,Equipment Evaluation, Education, & procurement,Home Management Training,Endurance Training      AM-PAC Score        AM-PAC Inpatient Daily Activity Raw Score: 18 (04/07/22 1023)  AM-PAC Inpatient ADL T-Scale Score : 38.66 (04/07/22 1023)  ADL Inpatient CMS 0-100% Score: 46.65 (04/07/22 1023)  ADL Inpatient CMS G-Code Modifier : CK (04/07/22 1023)    Goals  Short term goals  Time Frame for Short term goals: 2 weeks (4/21/22)  Short term goal 1: Mod I functional transfers to/from EOB, chair, toilet  Short term goal 2: Mod I toileting  Short term goal 3: Mod I LB dressing  Short term goal 4: Mod I grooming standing at sink  Short term goal 5: Mod I UB dressing. Patient Goals   Patient goals : to be able to return home at discharge       Therapy Time   Individual Concurrent Group Co-treatment   Time In 0923         Time Out 0958         Minutes 35         Timed Code Treatment Minutes: 20 Minutes (15 minute evaluation)     If pt is discharged prior to next OT session, this note will serve as the discharge summary.     Vee Young OT

## 2022-04-08 ENCOUNTER — APPOINTMENT (OUTPATIENT)
Dept: GENERAL RADIOLOGY | Age: 67
DRG: 216 | End: 2022-04-08
Payer: MEDICARE

## 2022-04-08 LAB
ANION GAP SERPL CALCULATED.3IONS-SCNC: 8 MMOL/L (ref 3–16)
BUN BLDV-MCNC: 21 MG/DL (ref 7–20)
CALCIUM IONIZED: 1.04 MMOL/L (ref 1.12–1.32)
CALCIUM SERPL-MCNC: 8.6 MG/DL (ref 8.3–10.6)
CHLORIDE BLD-SCNC: 91 MMOL/L (ref 99–110)
CO2: 27 MMOL/L (ref 21–32)
CREAT SERPL-MCNC: 0.6 MG/DL (ref 0.6–1.2)
GFR AFRICAN AMERICAN: >60
GFR NON-AFRICAN AMERICAN: >60
GLUCOSE BLD-MCNC: 104 MG/DL (ref 70–99)
GLUCOSE BLD-MCNC: 117 MG/DL (ref 70–99)
GLUCOSE BLD-MCNC: 162 MG/DL (ref 70–99)
GLUCOSE BLD-MCNC: 96 MG/DL (ref 70–99)
HCT VFR BLD CALC: 34.9 % (ref 36–48)
HEMOGLOBIN: 11.7 G/DL (ref 12–16)
MAGNESIUM: 1.7 MG/DL (ref 1.8–2.4)
MCH RBC QN AUTO: 31.8 PG (ref 26–34)
MCHC RBC AUTO-ENTMCNC: 33.6 G/DL (ref 31–36)
MCV RBC AUTO: 94.7 FL (ref 80–100)
PDW BLD-RTO: 13.5 % (ref 12.4–15.4)
PERFORMED ON: ABNORMAL
PERFORMED ON: ABNORMAL
PERFORMED ON: NORMAL
PH VENOUS: 7.47 (ref 7.35–7.45)
PLATELET # BLD: 129 K/UL (ref 135–450)
PMV BLD AUTO: 7.5 FL (ref 5–10.5)
POTASSIUM SERPL-SCNC: 4.2 MMOL/L (ref 3.5–5.1)
RBC # BLD: 3.69 M/UL (ref 4–5.2)
SODIUM BLD-SCNC: 126 MMOL/L (ref 136–145)
WBC # BLD: 10.4 K/UL (ref 4–11)

## 2022-04-08 PROCEDURE — 6370000000 HC RX 637 (ALT 250 FOR IP): Performed by: STUDENT IN AN ORGANIZED HEALTH CARE EDUCATION/TRAINING PROGRAM

## 2022-04-08 PROCEDURE — 94640 AIRWAY INHALATION TREATMENT: CPT

## 2022-04-08 PROCEDURE — 2140000000 HC CCU INTERMEDIATE R&B

## 2022-04-08 PROCEDURE — 80048 BASIC METABOLIC PNL TOTAL CA: CPT

## 2022-04-08 PROCEDURE — 6360000002 HC RX W HCPCS: Performed by: NURSE PRACTITIONER

## 2022-04-08 PROCEDURE — 6360000002 HC RX W HCPCS: Performed by: THORACIC SURGERY (CARDIOTHORACIC VASCULAR SURGERY)

## 2022-04-08 PROCEDURE — 97530 THERAPEUTIC ACTIVITIES: CPT

## 2022-04-08 PROCEDURE — 71045 X-RAY EXAM CHEST 1 VIEW: CPT

## 2022-04-08 PROCEDURE — 6370000000 HC RX 637 (ALT 250 FOR IP): Performed by: NURSE PRACTITIONER

## 2022-04-08 PROCEDURE — 97535 SELF CARE MNGMENT TRAINING: CPT

## 2022-04-08 PROCEDURE — 6370000000 HC RX 637 (ALT 250 FOR IP): Performed by: THORACIC SURGERY (CARDIOTHORACIC VASCULAR SURGERY)

## 2022-04-08 PROCEDURE — 2700000000 HC OXYGEN THERAPY PER DAY

## 2022-04-08 PROCEDURE — 94669 MECHANICAL CHEST WALL OSCILL: CPT

## 2022-04-08 PROCEDURE — 82330 ASSAY OF CALCIUM: CPT

## 2022-04-08 PROCEDURE — 97116 GAIT TRAINING THERAPY: CPT

## 2022-04-08 PROCEDURE — 85027 COMPLETE CBC AUTOMATED: CPT

## 2022-04-08 PROCEDURE — 97110 THERAPEUTIC EXERCISES: CPT

## 2022-04-08 PROCEDURE — 83735 ASSAY OF MAGNESIUM: CPT

## 2022-04-08 PROCEDURE — 2580000003 HC RX 258: Performed by: THORACIC SURGERY (CARDIOTHORACIC VASCULAR SURGERY)

## 2022-04-08 PROCEDURE — 94761 N-INVAS EAR/PLS OXIMETRY MLT: CPT

## 2022-04-08 PROCEDURE — 99024 POSTOP FOLLOW-UP VISIT: CPT | Performed by: NURSE PRACTITIONER

## 2022-04-08 PROCEDURE — 2580000003 HC RX 258: Performed by: NURSE PRACTITIONER

## 2022-04-08 RX ORDER — FAMOTIDINE 20 MG/1
20 TABLET, FILM COATED ORAL 2 TIMES DAILY
Status: DISCONTINUED | OUTPATIENT
Start: 2022-04-08 | End: 2022-04-14 | Stop reason: HOSPADM

## 2022-04-08 RX ORDER — METHOCARBAMOL 500 MG/1
500 TABLET, FILM COATED ORAL 3 TIMES DAILY PRN
Status: DISCONTINUED | OUTPATIENT
Start: 2022-04-08 | End: 2022-04-14 | Stop reason: HOSPADM

## 2022-04-08 RX ORDER — TRAMADOL HYDROCHLORIDE 50 MG/1
100 TABLET ORAL EVERY 6 HOURS PRN
Status: DISCONTINUED | OUTPATIENT
Start: 2022-04-08 | End: 2022-04-14

## 2022-04-08 RX ORDER — SODIUM CHLORIDE FOR INHALATION 3 %
4 VIAL, NEBULIZER (ML) INHALATION 3 TIMES DAILY
Status: DISCONTINUED | OUTPATIENT
Start: 2022-04-08 | End: 2022-04-11

## 2022-04-08 RX ORDER — SPIRONOLACTONE 25 MG/1
25 TABLET ORAL DAILY
Status: DISCONTINUED | OUTPATIENT
Start: 2022-04-08 | End: 2022-04-11

## 2022-04-08 RX ORDER — GABAPENTIN 100 MG/1
100 CAPSULE ORAL 3 TIMES DAILY
Status: DISCONTINUED | OUTPATIENT
Start: 2022-04-08 | End: 2022-04-09

## 2022-04-08 RX ORDER — KETOROLAC TROMETHAMINE 30 MG/ML
15 INJECTION, SOLUTION INTRAMUSCULAR; INTRAVENOUS ONCE
Status: COMPLETED | OUTPATIENT
Start: 2022-04-08 | End: 2022-04-08

## 2022-04-08 RX ORDER — TRAMADOL HYDROCHLORIDE 50 MG/1
50 TABLET ORAL EVERY 6 HOURS PRN
Status: DISCONTINUED | OUTPATIENT
Start: 2022-04-08 | End: 2022-04-14

## 2022-04-08 RX ORDER — FUROSEMIDE 10 MG/ML
40 INJECTION INTRAMUSCULAR; INTRAVENOUS 2 TIMES DAILY
Status: DISCONTINUED | OUTPATIENT
Start: 2022-04-08 | End: 2022-04-10

## 2022-04-08 RX ADMIN — SERTRALINE 25 MG: 50 TABLET, FILM COATED ORAL at 14:54

## 2022-04-08 RX ADMIN — ASPIRIN 81 MG: 81 TABLET, COATED ORAL at 09:18

## 2022-04-08 RX ADMIN — SODIUM CHLORIDE SOLN NEBU 3% 4 ML: 3 NEBU SOLN at 19:25

## 2022-04-08 RX ADMIN — Medication 400 MG: at 21:27

## 2022-04-08 RX ADMIN — Medication 400 MG: at 09:18

## 2022-04-08 RX ADMIN — ATORVASTATIN CALCIUM 40 MG: 40 TABLET, FILM COATED ORAL at 21:28

## 2022-04-08 RX ADMIN — GABAPENTIN 100 MG: 100 CAPSULE ORAL at 21:27

## 2022-04-08 RX ADMIN — ALBUTEROL SULFATE 2.5 MG: 2.5 SOLUTION RESPIRATORY (INHALATION) at 19:25

## 2022-04-08 RX ADMIN — MORPHINE SULFATE 2 MG: 2 INJECTION, SOLUTION INTRAMUSCULAR; INTRAVENOUS at 05:56

## 2022-04-08 RX ADMIN — MUPIROCIN: 20 OINTMENT TOPICAL at 09:21

## 2022-04-08 RX ADMIN — FUROSEMIDE 20 MG: 10 INJECTION, SOLUTION INTRAMUSCULAR; INTRAVENOUS at 06:57

## 2022-04-08 RX ADMIN — METOPROLOL TARTRATE 25 MG: 25 TABLET, FILM COATED ORAL at 09:18

## 2022-04-08 RX ADMIN — POTASSIUM CHLORIDE 10 MEQ: 10 TABLET, EXTENDED RELEASE ORAL at 13:57

## 2022-04-08 RX ADMIN — Medication 15 ML: at 21:28

## 2022-04-08 RX ADMIN — KETOROLAC TROMETHAMINE 15 MG: 30 INJECTION, SOLUTION INTRAMUSCULAR; INTRAVENOUS at 13:57

## 2022-04-08 RX ADMIN — RIVAROXABAN 20 MG: 20 TABLET, FILM COATED ORAL at 17:54

## 2022-04-08 RX ADMIN — SPIRONOLACTONE 25 MG: 25 TABLET ORAL at 14:54

## 2022-04-08 RX ADMIN — POTASSIUM CHLORIDE 10 MEQ: 10 TABLET, EXTENDED RELEASE ORAL at 09:18

## 2022-04-08 RX ADMIN — Medication 15 ML: at 09:21

## 2022-04-08 RX ADMIN — ALBUTEROL SULFATE 2.5 MG: 2.5 SOLUTION RESPIRATORY (INHALATION) at 08:23

## 2022-04-08 RX ADMIN — CEFAZOLIN SODIUM 1000 MG: 1 INJECTION, POWDER, FOR SOLUTION INTRAMUSCULAR; INTRAVENOUS at 04:23

## 2022-04-08 RX ADMIN — SODIUM CHLORIDE SOLN NEBU 3% 4 ML: 3 NEBU SOLN at 08:23

## 2022-04-08 RX ADMIN — MUPIROCIN: 20 OINTMENT TOPICAL at 21:28

## 2022-04-08 RX ADMIN — INSULIN LISPRO 1 UNITS: 100 INJECTION, SOLUTION INTRAVENOUS; SUBCUTANEOUS at 21:29

## 2022-04-08 RX ADMIN — SODIUM CHLORIDE, PRESERVATIVE FREE 10 ML: 5 INJECTION INTRAVENOUS at 21:29

## 2022-04-08 RX ADMIN — DIGOXIN 125 MCG: 125 TABLET ORAL at 09:18

## 2022-04-08 RX ADMIN — FUROSEMIDE 40 MG: 10 INJECTION, SOLUTION INTRAMUSCULAR; INTRAVENOUS at 17:54

## 2022-04-08 RX ADMIN — GABAPENTIN 100 MG: 100 CAPSULE ORAL at 09:19

## 2022-04-08 RX ADMIN — INSULIN GLARGINE 7 UNITS: 100 INJECTION, SOLUTION SUBCUTANEOUS at 21:29

## 2022-04-08 RX ADMIN — METOPROLOL TARTRATE 25 MG: 25 TABLET, FILM COATED ORAL at 21:27

## 2022-04-08 RX ADMIN — OXYCODONE 10 MG: 5 TABLET ORAL at 02:45

## 2022-04-08 RX ADMIN — GABAPENTIN 100 MG: 100 CAPSULE ORAL at 13:57

## 2022-04-08 RX ADMIN — OXYCODONE 10 MG: 5 TABLET ORAL at 09:18

## 2022-04-08 RX ADMIN — POTASSIUM CHLORIDE 10 MEQ: 10 TABLET, EXTENDED RELEASE ORAL at 17:56

## 2022-04-08 RX ADMIN — FAMOTIDINE 20 MG: 20 TABLET ORAL at 09:19

## 2022-04-08 RX ADMIN — FAMOTIDINE 20 MG: 20 TABLET ORAL at 21:28

## 2022-04-08 RX ADMIN — ALBUTEROL SULFATE 2.5 MG: 2.5 SOLUTION RESPIRATORY (INHALATION) at 17:19

## 2022-04-08 RX ADMIN — SODIUM CHLORIDE, PRESERVATIVE FREE 10 ML: 5 INJECTION INTRAVENOUS at 09:22

## 2022-04-08 ASSESSMENT — PAIN - FUNCTIONAL ASSESSMENT
PAIN_FUNCTIONAL_ASSESSMENT: PREVENTS OR INTERFERES SOME ACTIVE ACTIVITIES AND ADLS
PAIN_FUNCTIONAL_ASSESSMENT: PREVENTS OR INTERFERES SOME ACTIVE ACTIVITIES AND ADLS

## 2022-04-08 ASSESSMENT — PAIN DESCRIPTION - LOCATION
LOCATION: CHEST

## 2022-04-08 ASSESSMENT — PAIN SCALES - WONG BAKER: WONGBAKER_NUMERICALRESPONSE: 6

## 2022-04-08 ASSESSMENT — PAIN DESCRIPTION - ORIENTATION
ORIENTATION: RIGHT;ANTERIOR
ORIENTATION: RIGHT
ORIENTATION: RIGHT;ANTERIOR

## 2022-04-08 ASSESSMENT — PAIN DESCRIPTION - ONSET
ONSET: ON-GOING
ONSET: ON-GOING

## 2022-04-08 ASSESSMENT — PAIN SCALES - GENERAL
PAINLEVEL_OUTOF10: 10
PAINLEVEL_OUTOF10: 10
PAINLEVEL_OUTOF10: 7
PAINLEVEL_OUTOF10: 4
PAINLEVEL_OUTOF10: 8
PAINLEVEL_OUTOF10: 4

## 2022-04-08 ASSESSMENT — PAIN DESCRIPTION - FREQUENCY
FREQUENCY: INTERMITTENT
FREQUENCY: INTERMITTENT

## 2022-04-08 ASSESSMENT — PAIN DESCRIPTION - PAIN TYPE
TYPE: SURGICAL PAIN

## 2022-04-08 ASSESSMENT — PAIN DESCRIPTION - DESCRIPTORS
DESCRIPTORS: DISCOMFORT;SORE
DESCRIPTORS: DISCOMFORT;SORE

## 2022-04-08 NOTE — PROGRESS NOTES
Chest tubes meet criteria to remove per open heart protocol. No  air leak. no crepitus. Pt instructed on procedure. Site cleansed and prepped per protocol. Chest tube removed without difficulty. Dry sterile dressing applied. Bilateral breath sounds audible. O2 Sats 98 on 2 nasal cannula. Incision site within normal limits. Patient tolerated well. Criteria met per open heart protocol to discontinue turk catheter. Procedure explained to patient. 10cc deflated from balloon. Turk catheter discontinued without any difficulty. Call light in reach and patient instructed to call RN for assistance.

## 2022-04-08 NOTE — DISCHARGE INSTR - COC
Continuity of Care Form    Patient Name: Terese Shah   :  1955  MRN:  [id number]    [facility] date:  3/30/2022  Discharge date:  ***    Code Status Order: Full Code   Advance Directives:      Admitting Physician:  Cade York MD  PCP: Peggy Flannery MD    Discharging Nurse: [facility]/Room#: 0221/0221-01  Discharging Unit Phone Number: ***    Emergency Contact:   Extended Emergency Contact Information  Primary Emergency Contact: Caren Knight  Mobile Phone: [phone]  Relation: Niece/Nephew  Secondary Emergency Contact: Josr Jefferson  Mobile Phone: [phone]  Relation: Other    Past Surgical History:  Past Surgical History:   Procedure Laterality Date    MITRAL VALVE REPAIR N/A 2022    MINIMAL ACCESS MITRAL VALVE REPAIR USING HUTCHINSON 38MM PHYSIO II ANNULOPLASTY RING, RIGHT SIDED MAZE, LEFT ATERIAL APPENDAGE CLIP PLACEMENT, CRYO NERVE ABLATION, ON PUMP, WITH BILATERAL PECTORALIS BLOCKS, XAVIER, AND TEMPORARY PACING WIRE PLACEMENT performed by Cade York MD at [address] [place] [address]         Immunization History:   Immunization History   Administered Date(s) Administered    COVID-19, Pfizer Purple top, DILUTE for use, 12+ yrs, 30mcg/0.3mL dose 2021, 2021, 12/10/2021       Active Problems:  Patient Active Problem List   Diagnosis Code    Atrial fibrillation with RVR ([facility] [address] I48.91    New onset atrial fibrillation (HCC) I48.91    Decompensated heart failure ([facility] [address] I50.9    Pleural effusion, bilateral J90    LAFB (left anterior fascicular block) I44.4    Cardiomegaly R97.1    Pansystolic murmur F82.4    Mitral valve insufficiency I34.0       Isolation/Infection:   Isolation            No Isolation          Patient Infection Status       None to display            Nurse Assessment:  Last Vital Signs: /71   Pulse 101   Temp 98.4 °F (36.9 °C) (Oral)   Resp 18   Ht 5' (1.524 m)   Wt 104 lb 3.2 oz (47.3 kg)   SpO2 91%   BMI 20.35 kg/m²     Last documented pain score (0-10 scale): Pain Level: 10  Last Weight:   Wt Readings from Last 1 Encounters:   22 104 lb 3.2 oz (47.3 kg)     Mental Status:  {IP PT MENTAL STATUS:}    IV Access:  { MARBELLA IV ACCESS:435908355}    Nursing Mobility/ADLs:  Walking   {CHP DME QXWS:592646445}  Transfer  {CHP DME EMDT:937822492}  Bathing  {CHP DME CRD}  Dressing  {CHP DME ZPTQ:699574217}  Toileting  {CHP DME OGFB:746752289}  Feeding  {CHP DME ZOFM:224732029}  Med Admin  {CHP DME [employer]:527627754}  Med Delivery   { MARBELLA MED Delivery:650950507}    Wound Care Documentation and Therapy:        Elimination:  Continence: Bowel: {YES / CI:01068}  Bladder: {YES / AN:04482}  Urinary Catheter: {Urinary Catheter:165460905}   Colostomy/Ileostomy/Ileal Conduit: {YES / MT:77343}       Date of Last BM: ***    Intake/Output Summary (Last 24 hours) at 2022 1215  Last data filed at 2022 0375  Gross per 24 hour   Intake 1532 ml   Output 1275 ml   Net 257 ml     I/O last 3 completed shifts: In: 3167 [P.O.:960; I.V.:1746; IV Piggyback:461]  Out: 2700 [Urine:2200;  Chest Tube:500]    Safety Concerns:     [address] [employer] Safety Concerns:052684549}    Impairments/Disabilities:      [address] [employer] [employer] Impairments/Disabilities:187836968}    Nutrition Therapy:  Current Nutrition Therapy:   [address] [employer] Diet List:952317298}    Routes of Feeding: {[facility] DME Other Feedings:921261802}  Liquids: {Slp liquid thickness:01881}  Daily Fluid Restriction: {CHP DME Yes amt example:003812168}  Last Modified Barium Swallow with Video (Video Swallowing Test): {Done Not Done NSCA:513649249}    Treatments at the Time of Hospital Discharge:   Respiratory Treatments: ***  Oxygen Therapy:  {Therapy; copd oxygen:34119}  Ventilator:    { CC Vent MFCK:767549881}    Rehab Therapies: {THERAPEUTIC INTERVENTION:3076301578}  Weight Bearing Status/Restrictions: [address] Weight Bearin}  Other Medical Equipment (for information only, NOT a DME order):  {EQUIPMENT:536526527}  Other Treatments: ***    Patient's personal belongings (please select all that are sent with patient):  {CHP DME Belongings:555013840}    RN SIGNATURE:  {Esignature:457440396}    CASE MANAGEMENT/SOCIAL WORK SECTION    Inpatient Status Date: ***    Readmission Risk Assessment Score:  Readmission Risk              Risk of Unplanned Readmission:  17           Discharging to Facility/ [facility]   [phone]     Dialysis Facility (if applicable)   Name:  Address:  Dialysis Schedule:  Phone:  Fax:    / signature: Electronically signed by Dale Beckford RN on 4/11/22 at 11:47 AM EDT    PHYSICIAN SECTION    Prognosis: Good    Condition at Discharge: Stable    Rehab Potential (if transferring to Rehab): Good    Recommended Labs or Other Treatments After Discharge: Nayana Vidal, home PT/OT   Recommended Follow-up, Labs or Other Treatments After Discharge:    ***             Physician Certification: I certify the above information and transfer of Tavon Ambrocio  is necessary for the continuing treatment of the diagnosis listed and that she requires Home Care for less 30 days.      Update Admission H&P: No change in H&P    PHYSICIAN SIGNATURE:  Electronically signed by CHARLES Elizabeth CNP on 4/14/22 at 10:03 AM EDT

## 2022-04-08 NOTE — PLAN OF CARE
Patient's EF (Ejection Fraction) is greater than 40%    Heart Failure Medications:   Diuretics[de-identified] Torsemide, Spironolactone, Metalozone, Other and None     (One of the following REQUIRED for EF </= 40%/SYSTOLIC FAILURE but MAY be used in EF% >40%/DIASTOLIC FAILURE)        ACE[de-identified] None        ARB[de-identified] None         ARNI[de-identified] None    (Beta Blockers)   NON- Evidenced Based Beta Blocker (for EF% >40%/DIASTOLIC FAILURE): Metoprolol TARTrate- Lopressor     Evidenced Based Beta Blocker::(REQUIRED for EF% <40%/SYSTOLIC FAILURE) Metoprolol SUCCinate- Toprol XL  . .................................................................................................................................................. Patient's weights and intake/output reviewed: Yes    Patient's Last Weight: 46.2 kg obtained by standing scale. Difference of 0 lbs less than last documented weight. Intake/Output Summary (Last 24 hours) at 4/8/2022 1848  Last data filed at 4/8/2022 1600  Gross per 24 hour   Intake 461 ml   Output 1660 ml   Net -1199 ml       Comorbidities Reviewed Yes    Patient has a past medical history of Varicose vein. >>For CHF and Comorbidity documentation on Education Time and Topics, please see Education Tab    Progressive Mobility Assessment:  What is this patient's Current Level of Mobility?: Ambulatory- with Assistance  How was this patient Mobilized today?: Edge of Bed, Bedside Commode,  Up to Toilet/Shower, Up in Room, Up in [place], Unable to Mobilize and Patient Refuses to Mobilize, ambulated 10 ft                 With Whom? PCA and Self                 Level of Difficulty/Assistance: 2x Assist     Pt resting in bed at this time on room air. Pt denies shortness of breath. Pt with nonpitting lower extremity edema.      Patient and/or Family's stated Goal of Care this Admission: reduce shortness of breath, increase activity tolerance, be more comfortable and reduce lower extremity edema prior to discharge        :

## 2022-04-08 NOTE — PROGRESS NOTES
Physical Therapy  Facility/Department: [facility] C2 CARD TELEMETRY  Daily Treatment Note  NAME: Antonette Carlson  : 1955  MRN: [id number]    Date of Service: 2022    Discharge Recommendations:  24 hour supervision or assist   DME: Continue to assess for RW       Assessment   Body structures, Functions, Activity limitations: Decreased functional mobility ; Decreased endurance; Increased pain  Assessment: Pt seen for PT following A fib with RVR s/p minimally invasive mitral valve repair on 22. Pt continues to function below baseline; performs simple transfers with supervision, ambulations with CGA with RW and sit to supine with supervision. Pt requires increased time to complete for all task due to pain. Pt would benefit from continued skilled PT to address deficits listed above. Recommend home with  sup/assist upon d/c  Treatment Diagnosis: impaired mobility  Prognosis: Good  Decision Making: High Complexity  PT Education: Goals;Gait Training;PT Role;Disease Specific Education;Plan of Care; Functional Mobility Training;Home Exercise Program;Transfer Training  Patient Education: Pt educated on importance of continued mobility while in acute care, exercises performed this date and safety with transfers- verbalizes understanding  Barriers to Learning: no  REQUIRES PT FOLLOW UP: Yes  Activity Tolerance  Activity Tolerance: Patient Tolerated treatment well  Activity Tolerance: Sitting up in Chair: BP 91/55, HR 88 bpm, SpO2 94% on 1 L; Supine in bed at end of session: /57,  bpm, SpO2 94% on 1 L     Patient Diagnosis(es): The encounter diagnosis was Atrial fibrillation with RVR ([address] has a past medical history of Varicose vein. has a past surgical history that includes Tonsillectomy and Mitral valve repair (N/A, 2022).     Restrictions  Restrictions/Precautions  Restrictions/Precautions: Fall Risk,General Precautions  Position Activity Restriction  Other position/activity restrictions: progressive ambulation, turk, chest tube. Sternal precuations in chart, per RN no sternal precautions d/t minimally invasive sx    Subjective   General  Chart Reviewed: Yes  Response To Previous Treatment: Patient with no complaints from previous session. Family / Caregiver Present: No  Referring Practitioner: Jordy Hart MD  Subjective  Subjective: Pt sitting up in chair upon arrival, agreeable to PT session. Pt on RA, pt states she took her O2 off to eat. SpO2 86-88% on RA, placed back on 1 L per RN with SpO2 92-94%          Orientation  Orientation  Overall Orientation Status: Within Normal Limits     Objective   Bed mobility  Supine to Sit: Unable to assess (Pt in chair at beginning of session)  Sit to Supine: Supervision (HOB flat, no bedrails, increased time to complete)    Transfers  Sit to Stand: Supervision  Stand to sit: Supervision  Comment: STS x2 reps from recliner with supervision    Ambulation  Ambulation?: Yes  Ambulation 1  Surface: level tile  Device: Rolling Walker  Assistance: Contact guard assistance  Gait Deviations: Slow Sylvie; Increased ROBERTA; Decreased step length;Decreased step height  Distance: 15 ft  Comments: Pt ambulates with overall steady gait with RW this date.  Slow gait speed     Balance  Posture: Good  Sitting - Static: Good  Sitting - Dynamic: Good  Standing - Static: Good  Standing - Dynamic: Good;-    Exercises  Straight Leg Raise: Bx10  Quad Sets: Bx10  Heelslides: Bx10  Gluteal Sets: Bx10  Hip Abduction: Bx10  Knee Long Arc Quad: Bx10  Ankle Pumps: Bx10        AM-PAC Score  AM-PAC Inpatient Mobility Raw Score : 18 (04/08/22 1005)  AM-PAC Inpatient T-Scale Score : 43.63 (04/08/22 1005)  Mobility Inpatient CMS 0-100% Score: 46.58 (04/08/22 1005)  Mobility Inpatient CMS G-Code Modifier : CK (04/08/22 1005)          Goals  Short term goals  Time Frame for Short term goals: 4/13/22 unless noted  Short term goal 1: Pt will perform bed mobility with supervision by 4/12/22 -- 4/8 Goal partially met, sit to supine with supervision  Short term goal 2: Pt will perform transfers with supervision -- 4/8 Goal Met  Short term goal 3: Pt will ambulate 150 ft with LRAD or no AD and supervision -- 4/8 15 ft with RW and CGA  Short term goal 4: Pt will negotiate 2 stairs with 1 handrail and CGA--4/8 NT due to fatigue  Patient Goals   Patient goals : \"to go home\"    Plan    Plan  Times per week: 5-7  Current Treatment Recommendations: Strengthening,Home Exercise Program,ROM,Balance Training,Endurance Training,Functional Mobility Training,Transfer Training,Gait Training,Stair training  Safety Devices  Type of devices: All fall risk precautions in place,Bed alarm in place,Call light within reach,Patient at risk for falls,Left in bed,Gait belt,Nurse notified     Therapy Time   Individual Concurrent Group Co-treatment   Time In 0950         Time Out 1028         Minutes 38         Timed Code Treatment Minutes: 38 Minutes     If pt is unable to be seen after this session, please let this note serve as discharge summary. Please see case management note for discharge disposition. Thank you.     Franko Calles, PT

## 2022-04-08 NOTE — PLAN OF CARE
Patient's EF (Ejection Fraction) is greater than 40%    Heart Failure Medications:   Diuretics[de-identified] Torsemide, Spironolactone, Metalozone, Other and None     (One of the following REQUIRED for EF </= 40%/SYSTOLIC FAILURE but MAY be used in EF% >40%/DIASTOLIC FAILURE)        ACE[de-identified] None        ARB[de-identified] None         ARNI[de-identified] None    (Beta Blockers)   NON- Evidenced Based Beta Blocker (for EF% >40%/DIASTOLIC FAILURE): Metoprolol TARTrate- Lopressor     Evidenced Based Beta Blocker::(REQUIRED for EF% <40%/SYSTOLIC FAILURE) Metoprolol SUCCinate- Toprol XL  . .................................................................................................................................................. Patient's weights and intake/output reviewed: Yes    Patient's Last Weight: 46.2 kg obtained by standing scale. Difference of 0 lbs less than last documented weight. Intake/Output Summary (Last 24 hours) at 4/8/2022 0126  Last data filed at 4/7/2022 2149  Gross per 24 hour   Intake 2586 ml   Output 1715 ml   Net 871 ml       Comorbidities Reviewed Yes    Patient has a past medical history of Varicose vein. >>For CHF and Comorbidity documentation on Education Time and Topics, please see Education Tab    Progressive Mobility Assessment:  What is this patient's Current Level of Mobility?: Ambulatory- with Assistance  How was this patient Mobilized today?: Edge of Bed, Bedside Commode,  Up to Toilet/Shower, Up in Room, Up in [place], Unable to Mobilize and Patient Refuses to Mobilize, ambulated 10 ft                 With Whom? PCA and Self                 Level of Difficulty/Assistance: 2x Assist     Pt resting in bed at this time on room air. Pt denies shortness of breath. Pt with nonpitting lower extremity edema.      Patient and/or Family's stated Goal of Care this Admission: reduce shortness of breath, increase activity tolerance, be more comfortable and reduce lower extremity edema prior to discharge        :

## 2022-04-08 NOTE — PROGRESS NOTES
CVTS Cardiothoracic Progress Note:                                CC:  Post op follow up     Surgery: 4/6 MINIMAL ACCESS MITRAL VALVE REPAIR USING HUTCHINSON 38MM PHYSIO II ANNULOPLASTY RING, RIGHT SIDED MAZE, LEFT ATERIAL APPENDAGE CLIP PLACEMENT, CRYO NERVE ABLATION, ON PUMP, WITH BILATERAL PECTORALIS BLOCKS, XAVIER, AND TEMPORARY PACING [facility] course:  4/7 Hemodynamically stable overnight. On low dose levo for BP support. 4/8 No acute events overnight. BPs soft but adequate. Remains in atrial fibrillation.        Past Medical History:   Diagnosis Date    Varicose vein         Past Surgical History:   Procedure Laterality Date    MITRAL VALVE REPAIR N/A 4/6/2022    MINIMAL ACCESS MITRAL VALVE REPAIR USING HUTCHINSON 38MM PHYSIO II ANNULOPLASTY RING, RIGHT SIDED MAZE, LEFT ATERIAL APPENDAGE CLIP PLACEMENT, CRYO NERVE ABLATION, ON PUMP, WITH BILATERAL PECTORALIS BLOCKS, XAVIER, AND TEMPORARY PACING WIRE PLACEMENT performed by Yeison Conti MD at [address] [place] [address] as of 03/30/2022 - Fully Reviewed 03/30/2022   Allergen Reaction Noted    Dairycare [lactase-lactobacillus]  03/30/2022    Eggs or egg-derived products  03/30/2022        Patient Active Problem List   Diagnosis    Atrial fibrillation with RVR ([facility] [address]    New onset atrial fibrillation (HCC)    Decompensated heart failure (HCC)    Pleural effusion, bilateral    LAFB (left anterior fascicular block)    Cardiomegaly    Pansystolic murmur    Mitral valve insufficiency        Vital Signs: /61   Pulse 100   Temp 98.4 °F (36.9 °C) (Oral)   Resp 18   Ht 5' (1.524 m)   Wt 104 lb 3.2 oz (47.3 kg)   SpO2 91%   BMI 20.35 kg/m²  O2 Flow Rate (L/min): 1 L/min     Admission Weight: Weight: 107 lb (48.5 kg)    Weight on 4/6 (43.6 kg) Pre-op   Weight on 4/7 (46.2 kg) +2.6 kg   4/8 47.3 kg +3.7 kg     Intake/Output:     Intake/Output Summary (Last 24 hours) at 4/8/2022 6556  Last data filed at 4/8/2022 6271  Gross per 24 hour   Intake 1652 ml   Output 1600 ml   Net 52 ml        LABORATORY DATA:     CBC:   Recent Labs     04/06/22  1355 04/07/22  0410 04/08/22 0412   WBC 14.3* 8.6 10.4   HGB 13.0 10.9* 11.7*   HCT 39.6 32.6* 34.9*   MCV 94.5 94.9 94.7   * 89* 129*     BMP:   Recent Labs     04/06/22  1355 04/06/22  1355 04/06/22  2240 04/07/22  0410 04/08/22 0412     --   --  135* 126*   K 4.5   < > 4.5 5.0 4.2   CL 99  --   --  100 91*   CO2 29  --   --  28 27   BUN 22*  --   --  22* 21*   CREATININE 0.7  --   --  0.6 0.6    < > = values in this interval not displayed. MG:    Recent Labs     04/06/22  1355 04/07/22  0410 04/08/22 0412   MG 3.00* 2.20 1.70*    PT/INR:   Recent Labs     04/06/22  0640   PROTIME 13.9*   INR 1.22*     CXR: 4/8/22  FINDINGS:   Left internal jugular Cordis extends just inferior to the left clavicular   head, indeterminate in location, though similar to prior.  Left atrial   exclusion device and valve replacement.  Right chest tube is seen extending   to the apex before extending to the medial right base.  Heterogeneous opacity   within the right lung is slightly greater than prior.  Small right pleural   effusion.  Developing left basilar pleuroparenchymal opacity as compared to   prior.  Moderate soft tissue emphysema at the right chest wall and right   supraclavicular region, similar to prior.           Impression   Bilateral atelectasis/airspace disease, increased on the right and new at the   left base.  New small left pleural effusion and persistent small right   pleural effusion.      ________________________________________________________________________    Subjective:   Dietary Intake: needs improvement    no Nausea   Pain Control: needs improvement   Complaints: expected post operative pain   Bowels: no BM     Objective:   General appearance: resting in bed, in nad   Lungs: diminished bilateral bases   Heart: S1S2 normal, irregularly irregular; controlled afib on monitor  Chest: symmetrical expansion with inspiration and expirations; no rocking of sternum noted. Right chest wall crepitus   Abdomen: soft, non-tender  Bowel sounds: normoactive   Kidneys: -225-287=4048 ml over 24 hours; Cr 0.6  Wound/Incisions: Right chest wall incision with dressing CDI; pacing wires out 4/7  Extremities: BLE pulses present; trace swelling noted in BLE  Neurological: alert, oriented and grossly non-focal   Chest tubes/Drains: Chest tube # 1 with -457=302 ml serosanguinous drainage in 24 hours; small airleak noted      Assessment:   Post-op: 2 days. Condition: In stable condition. Plan:   1. Cardiovascular: s/p MVr with annuloplasty ring, right sided MAZE, ISAAC clip   ASA, statin, BB  Chronic, persistent atrial fibrillation with gigantic left atrium- rate controlled. On dig, BB, xarelto     2. Pulmonary:   Back on 1L NC this morning, wean as able   Expansion measures- IS, acapella, oobtc, ambulation  Albuterol every 4 hours while awake. Hypertonic nebs. EZPAP     3. Neurology:   Post operative pain- PRN tylenol, oxy. Still with moderate post op pain- trial scheduled gabapentin     4. Nephrology:   Weight still up from pre-op  Adequate 24 hour UOP with Cr of 0.6  Diurese as tolerated- Lasix 20 mg IV every 6 hours     5. Endocrinology:   Glucose stable on Lantus and SSI   No hx of DM. A1C 5.6 on 4/6    6. Hematology:   Acute blood loss anemia- H&H stable  Post operative thrombocytopenia- plts improved at 129    7. Microbiology:   No issues at present     8. Nutrition:   Continue diet as ordered. ONS     9. Labs:   AM labs and imaging reviewed as above   Hyponatremia- na 126. 1500 ml fluid restriction. Monitor     10. Post-op Drains/Wires: Keeping chest tube for today. Remove arterial line and CVC. Left IJ CVC (4/6)   Left brachial caesar (4/6)     11. D/C Goals: CM following. Will await PT/OT recs.  Pt lives at home alone PTA and does not have any family members that would be able to stay with her following surgery.      12. Continue post-op care of patient in the ICU    GI prophylaxis: pepcid   DVT prophylaxis: xarelto   ________________________________________________________________________  CHARLES Ackerman CNP  4/8/2022  9:13 AM

## 2022-04-08 NOTE — PROGRESS NOTES
A-line removed without complication, pressure held for 10 min until hemostasis achieved, and covered with Thrombix and Tegaderm. Will continue to monitor.

## 2022-04-08 NOTE — FLOWSHEET NOTE
04/08/22 0619   Vitals   Pulse 100   Height and Weight   Weight 104 lb 3.2 oz (47.3 kg)   Weight Method Actual;Standing scale   BMI (Calculated) 20.4

## 2022-04-08 NOTE — PROGRESS NOTES
Occupational Therapy  Facility/Department: [facility] C2 CARD TELEMETRY  Daily Treatment Note  NAME: Terese Shah  : 1955  MRN: [id number]    Date of Service: 2022    Discharge Recommendations:  Subacute/Skilled Nursing Facility     Assessment   Performance deficits / Impairments: Decreased functional mobility ; Decreased balance;Decreased ADL status; Decreased endurance;Decreased high-level IADLs;Decreased safe awareness;Decreased strength  Assessment: Pt demos good tolerance of OT treatment, somewhat limited by pain. Pt with 2 posterior LOB with static stand and no AD, able to self correct by bracing self on bed, otherwise SBA for entirety of treatment this date. Pt functioning below her baseline and per chart does not have 24 hr A available at d/c therefore pt will require SNF at d/c. Prognosis: Good  OT Education: OT Role;Plan of Care;Energy Conservation; ADL Adaptive Strategies;Transfer Training  Disease Specific Education: Pt educated on importance of OOB mobility, prevention of complications of bedrest, and general safety during hospitalization. Pt verbalized understanding. Barriers to Learning: none perceived  REQUIRES OT FOLLOW UP: Yes  Activity Tolerance  Activity Tolerance: Patient Tolerated treatment well;Patient limited by pain  Activity Tolerance: Vitals: BP= 111/91, HR= 108, SPO2= 95% RA  Safety Devices  Safety Devices in place: Yes  Type of devices: Left in bed;Call light within reach;Nurse notified;Gait belt (returned to bed per RN request to pull chest tube)         Patient Diagnosis(es): The encounter diagnosis was Atrial fibrillation with RVR ([address] has a past medical history of Varicose vein. has a past surgical history that includes Tonsillectomy and Mitral valve repair (N/A, 2022).     Restrictions  Restrictions/Precautions  Restrictions/Precautions: Fall Risk,General Precautions  Position Activity Restriction  Other position/activity restrictions: progressive ambulation, rosalee chest tube. Sternal precuations in chart, per RN no sternal precautions d/t minimally invasive sx     Subjective   General  Chart Reviewed: Yes  Patient assessed for rehabilitation services?: Yes  Response to previous treatment: Patient with no complaints from previous session  Family / Caregiver Present: No  Diagnosis: Afib with RVR, chest pain, pulmonary edema s/p minimally invasive MVR, partial R MAZE, ISAAC clip on 4/6 by Dr. Bro Weiss    Subjective  Subjective: Pt resting in bed, agreeable to OT treatment. Pain Assessment  Pain Assessment: Faces  Mcgee-Baker Pain Rating: Hurts even more  Pain Type: Surgical pain  Pain Location: Chest  Pain Orientation: Right  Non-Pharmaceutical Pain Intervention(s): Repositioned; Ambulation/Increased Activity  Pre Treatment Pain Screening  Intervention List: Patient able to continue with treatment  Vital Signs  Patient Currently in Pain: Yes     Orientation  Orientation  Overall Orientation Status: Within Functional Limits     Objective    ADL  Grooming: Stand by assistance (in stance at sink)  Toileting: Dependent/Total (turk)     Balance  Sitting Balance: Supervision  Standing Balance: Stand by assistance  Standing Balance  Activity: functional/bathroom mobility, sink ADLs, transfer training    Functional Mobility  Functional - Mobility Device: Rolling Walker  Activity: To/from bathroom; Other  Assist Level: Stand by assistance    Bed mobility  Supine to Sit: Supervision  Sit to Supine: Supervision     Transfers  Sit to stand: Stand by assistance  Stand to sit: Stand by assistance     Cognition  Safety Judgement: Decreased awareness of need for safety     Plan   Plan  Times per week: 4-6x/week  Current Treatment Recommendations: Pain Management,Positioning,Gait Training,Safety Education & Training,Balance Training,Patient/Caregiver Education & Training,Self-Care / ADL,Functional Mobility Training,Equipment Evaluation, Education, & procurement,Home Management Training,Endurance Training    AM-PAC Score  AM-PAC Inpatient Daily Activity Raw Score: 19 (04/08/22 1446)  AM-PAC Inpatient ADL T-Scale Score : 40.22 (04/08/22 1446)  ADL Inpatient CMS 0-100% Score: 42.8 (04/08/22 1446)  ADL Inpatient CMS G-Code Modifier : CK (04/08/22 1446)    Goals  Short term goals  Time Frame for Short term goals: 2 weeks (4/21/22)  Short term goal 1: Mod I functional transfers to/from EOB, chair, toilet-- ongoing 4/8/22  Short term goal 2: Mod I toileting-- ongoing 4/8/22  Short term goal 3: Mod I LB dressing-- ongoing 4/8/22  Short term goal 4: Mod I grooming standing at sink-- ongoing 4/8/22  Short term goal 5: Mod I UB dressing.-- ongoing 4/8/22  Patient Goals   Patient goals : to be able to return home at discharge       Therapy Time   Individual Concurrent Group Co-treatment   Time In 1310         Time Out 1350         Minutes [address], [place]/L

## 2022-04-08 NOTE — PROGRESS NOTES
Shift: 0700/1900    Nursing assessment at handoff  stable    Rhythm changes Atrial Fibrillation/ controlled    Rhythm Intervention on Lanoxin & ASA    Drop in Urinary Output no , IVP lasix with 575ml urine  Changes to drips? Levo off  POD 1 ONLY: Pacing Wires Removed: []Yes           [] NO        [] Platelets < 61,024        [] Arrhythmia        [] Bradycardia        [x] Valve Replacement         [] Pacing for Cardiac Index    LDA Insertion Date Date Changed (if needed) Discontinued Date   Art line   4/8/22   Central Line   4/8/22   ET Tube      Panchal   4/8/22   Chest Tube   4/8/22   Leg Drain      Pacing Wires        Surgery, return to OR no     Hospital Course:  POD# 1    POD#2    CT, A-line, IJ, Panchal, removed without complications,    Changes in O2 requirements Oxygen Therapy  SpO2: 98 %  Pulse Oximeter Device Mode: Continuous  Pulse Oximeter Device Location: Finger  O2 Device: Nasal cannula  O2 Flow Rate (L/min): 3 L/min     Most recent vitals /68   Pulse 80   Temp 98.8 °F (37.1 °C) (Oral)   Resp 18   Ht 5' (1.524 m)   Wt 104 lb 3.2 oz (47.3 kg)   SpO2 98%   BMI 20.35 kg/m²       Admission weight Weight: 107 lb (48.5 kg)     Today's weight   Wt Readings from Last 1 Encounters:   04/08/22 104 lb 3.2 oz (47.3 kg)         Lab Data:  CBC:   Recent Labs     04/08/22  0412   WBC 10.4   HGB 11.7*   HCT 34.9*   MCV 94.7   *     BMP:    Recent Labs     04/08/22  0412   *   K 4.2   CO2 27   BUN 21*   CREATININE 0.6     LIVR:   Recent Labs     04/06/22  0640   AST 27   ALT 22     PT/INR:   Recent Labs     04/06/22  0640   PROT 7.3   INR 1.22*     APTT: No results for input(s): APTT in the last 72 hours.     Drip rates at handoff:   none      Electronically signed by Pauly Agudelo RN on 4/7/22 at 6:58 PM EDT

## 2022-04-08 NOTE — CARE COORDINATION
Spoke to [address] [place] [address],[address] with CT surgery. She states that therapy is recommending home with 24/7 supervision and assist which patient will not have at discharge. She would like for patient to be able to discharge to SNF for safety purposes. Discussed with patient and she is in agreement with this plan. Provided her with [facility] SNF list.  She is requesting some time to think about her choices. She is getting her laptop out to check out [facility], [facility]. [facility] and [facility] [facility]. Informed her that CM will revisit in order to get choices. She verbalized understanding. Will need precert. 3255 addendum: Spoke with patient at bedside but she states she has not had a chance to check out any of the facilities yet due to [place] lot going on\". Will follow up with her on facility choice at a later time.

## 2022-04-09 ENCOUNTER — APPOINTMENT (OUTPATIENT)
Dept: GENERAL RADIOLOGY | Age: 67
DRG: 216 | End: 2022-04-09
Payer: MEDICARE

## 2022-04-09 LAB
ANION GAP SERPL CALCULATED.3IONS-SCNC: 6 MMOL/L (ref 3–16)
BLOOD BANK DISPENSE STATUS: NORMAL
BLOOD BANK DISPENSE STATUS: NORMAL
BLOOD BANK PRODUCT CODE: NORMAL
BLOOD BANK PRODUCT CODE: NORMAL
BPU ID: NORMAL
BPU ID: NORMAL
BUN BLDV-MCNC: 29 MG/DL (ref 7–20)
CALCIUM SERPL-MCNC: 8.9 MG/DL (ref 8.3–10.6)
CHLORIDE BLD-SCNC: 87 MMOL/L (ref 99–110)
CO2: 29 MMOL/L (ref 21–32)
CREAT SERPL-MCNC: 0.6 MG/DL (ref 0.6–1.2)
DESCRIPTION BLOOD BANK: NORMAL
DESCRIPTION BLOOD BANK: NORMAL
GFR AFRICAN AMERICAN: >60
GFR NON-AFRICAN AMERICAN: >60
GLUCOSE BLD-MCNC: 86 MG/DL (ref 70–99)
GLUCOSE BLD-MCNC: 89 MG/DL (ref 70–99)
GLUCOSE BLD-MCNC: 91 MG/DL (ref 70–99)
GLUCOSE BLD-MCNC: 99 MG/DL (ref 70–99)
HCT VFR BLD CALC: 34.5 % (ref 36–48)
HEMOGLOBIN: 11.8 G/DL (ref 12–16)
MAGNESIUM: 2 MG/DL (ref 1.8–2.4)
MCH RBC QN AUTO: 32.5 PG (ref 26–34)
MCHC RBC AUTO-ENTMCNC: 34.2 G/DL (ref 31–36)
MCV RBC AUTO: 95 FL (ref 80–100)
PDW BLD-RTO: 13.1 % (ref 12.4–15.4)
PERFORMED ON: NORMAL
PLATELET # BLD: 145 K/UL (ref 135–450)
PMV BLD AUTO: 7.8 FL (ref 5–10.5)
POTASSIUM SERPL-SCNC: 4.9 MMOL/L (ref 3.5–5.1)
RBC # BLD: 3.63 M/UL (ref 4–5.2)
SODIUM BLD-SCNC: 122 MMOL/L (ref 136–145)
WBC # BLD: 10.4 K/UL (ref 4–11)

## 2022-04-09 PROCEDURE — 2580000003 HC RX 258: Performed by: THORACIC SURGERY (CARDIOTHORACIC VASCULAR SURGERY)

## 2022-04-09 PROCEDURE — 83735 ASSAY OF MAGNESIUM: CPT

## 2022-04-09 PROCEDURE — 36415 COLL VENOUS BLD VENIPUNCTURE: CPT

## 2022-04-09 PROCEDURE — 6360000002 HC RX W HCPCS: Performed by: THORACIC SURGERY (CARDIOTHORACIC VASCULAR SURGERY)

## 2022-04-09 PROCEDURE — 6360000002 HC RX W HCPCS: Performed by: NURSE PRACTITIONER

## 2022-04-09 PROCEDURE — 99024 POSTOP FOLLOW-UP VISIT: CPT | Performed by: STUDENT IN AN ORGANIZED HEALTH CARE EDUCATION/TRAINING PROGRAM

## 2022-04-09 PROCEDURE — 2580000003 HC RX 258: Performed by: NURSE PRACTITIONER

## 2022-04-09 PROCEDURE — 71045 X-RAY EXAM CHEST 1 VIEW: CPT

## 2022-04-09 PROCEDURE — 2700000000 HC OXYGEN THERAPY PER DAY

## 2022-04-09 PROCEDURE — 85027 COMPLETE CBC AUTOMATED: CPT

## 2022-04-09 PROCEDURE — 6370000000 HC RX 637 (ALT 250 FOR IP): Performed by: STUDENT IN AN ORGANIZED HEALTH CARE EDUCATION/TRAINING PROGRAM

## 2022-04-09 PROCEDURE — 80048 BASIC METABOLIC PNL TOTAL CA: CPT

## 2022-04-09 PROCEDURE — 97110 THERAPEUTIC EXERCISES: CPT

## 2022-04-09 PROCEDURE — 6370000000 HC RX 637 (ALT 250 FOR IP): Performed by: NURSE PRACTITIONER

## 2022-04-09 PROCEDURE — 97535 SELF CARE MNGMENT TRAINING: CPT

## 2022-04-09 PROCEDURE — 94669 MECHANICAL CHEST WALL OSCILL: CPT

## 2022-04-09 PROCEDURE — 2140000000 HC CCU INTERMEDIATE R&B

## 2022-04-09 PROCEDURE — 6370000000 HC RX 637 (ALT 250 FOR IP): Performed by: THORACIC SURGERY (CARDIOTHORACIC VASCULAR SURGERY)

## 2022-04-09 PROCEDURE — 94640 AIRWAY INHALATION TREATMENT: CPT

## 2022-04-09 PROCEDURE — 94761 N-INVAS EAR/PLS OXIMETRY MLT: CPT

## 2022-04-09 RX ORDER — GABAPENTIN 100 MG/1
200 CAPSULE ORAL 3 TIMES DAILY
Status: DISCONTINUED | OUTPATIENT
Start: 2022-04-09 | End: 2022-04-12

## 2022-04-09 RX ADMIN — ALBUTEROL SULFATE 2.5 MG: 2.5 SOLUTION RESPIRATORY (INHALATION) at 11:24

## 2022-04-09 RX ADMIN — INSULIN GLARGINE 7 UNITS: 100 INJECTION, SOLUTION SUBCUTANEOUS at 21:43

## 2022-04-09 RX ADMIN — POTASSIUM CHLORIDE 10 MEQ: 10 TABLET, EXTENDED RELEASE ORAL at 18:19

## 2022-04-09 RX ADMIN — SERTRALINE 25 MG: 50 TABLET, FILM COATED ORAL at 08:13

## 2022-04-09 RX ADMIN — DIGOXIN 125 MCG: 125 TABLET ORAL at 08:13

## 2022-04-09 RX ADMIN — FUROSEMIDE 40 MG: 10 INJECTION, SOLUTION INTRAMUSCULAR; INTRAVENOUS at 08:13

## 2022-04-09 RX ADMIN — TRAMADOL HYDROCHLORIDE 100 MG: 50 TABLET, COATED ORAL at 09:58

## 2022-04-09 RX ADMIN — ALBUTEROL SULFATE 2.5 MG: 2.5 SOLUTION RESPIRATORY (INHALATION) at 16:04

## 2022-04-09 RX ADMIN — MUPIROCIN: 20 OINTMENT TOPICAL at 21:41

## 2022-04-09 RX ADMIN — Medication 15 ML: at 08:20

## 2022-04-09 RX ADMIN — FAMOTIDINE 20 MG: 20 TABLET ORAL at 08:14

## 2022-04-09 RX ADMIN — SODIUM CHLORIDE, PRESERVATIVE FREE 10 ML: 5 INJECTION INTRAVENOUS at 21:42

## 2022-04-09 RX ADMIN — SODIUM CHLORIDE SOLN NEBU 3% 4 ML: 3 NEBU SOLN at 11:26

## 2022-04-09 RX ADMIN — TRAMADOL HYDROCHLORIDE 100 MG: 50 TABLET, COATED ORAL at 16:10

## 2022-04-09 RX ADMIN — POTASSIUM CHLORIDE 10 MEQ: 10 TABLET, EXTENDED RELEASE ORAL at 08:14

## 2022-04-09 RX ADMIN — MUPIROCIN: 20 OINTMENT TOPICAL at 08:20

## 2022-04-09 RX ADMIN — GABAPENTIN 200 MG: 100 CAPSULE ORAL at 21:41

## 2022-04-09 RX ADMIN — METOPROLOL TARTRATE 25 MG: 25 TABLET, FILM COATED ORAL at 21:41

## 2022-04-09 RX ADMIN — ALBUTEROL SULFATE 2.5 MG: 2.5 SOLUTION RESPIRATORY (INHALATION) at 20:58

## 2022-04-09 RX ADMIN — TRAMADOL HYDROCHLORIDE 100 MG: 50 TABLET, COATED ORAL at 23:26

## 2022-04-09 RX ADMIN — GABAPENTIN 200 MG: 100 CAPSULE ORAL at 08:13

## 2022-04-09 RX ADMIN — SODIUM CHLORIDE, PRESERVATIVE FREE 10 ML: 5 INJECTION INTRAVENOUS at 08:14

## 2022-04-09 RX ADMIN — Medication 400 MG: at 21:41

## 2022-04-09 RX ADMIN — Medication 400 MG: at 08:45

## 2022-04-09 RX ADMIN — METOPROLOL TARTRATE 25 MG: 25 TABLET, FILM COATED ORAL at 08:14

## 2022-04-09 RX ADMIN — FUROSEMIDE 40 MG: 10 INJECTION, SOLUTION INTRAMUSCULAR; INTRAVENOUS at 19:34

## 2022-04-09 RX ADMIN — TRAMADOL HYDROCHLORIDE 100 MG: 50 TABLET, COATED ORAL at 02:49

## 2022-04-09 RX ADMIN — FAMOTIDINE 20 MG: 20 TABLET ORAL at 21:41

## 2022-04-09 RX ADMIN — POTASSIUM CHLORIDE 10 MEQ: 10 TABLET, EXTENDED RELEASE ORAL at 12:59

## 2022-04-09 RX ADMIN — ATORVASTATIN CALCIUM 40 MG: 40 TABLET, FILM COATED ORAL at 21:41

## 2022-04-09 RX ADMIN — RIVAROXABAN 20 MG: 20 TABLET, FILM COATED ORAL at 21:40

## 2022-04-09 RX ADMIN — GABAPENTIN 200 MG: 100 CAPSULE ORAL at 16:10

## 2022-04-09 RX ADMIN — SPIRONOLACTONE 25 MG: 25 TABLET ORAL at 08:14

## 2022-04-09 RX ADMIN — ASPIRIN 81 MG: 81 TABLET, COATED ORAL at 08:13

## 2022-04-09 RX ADMIN — Medication 15 ML: at 21:42

## 2022-04-09 ASSESSMENT — PAIN DESCRIPTION - ORIENTATION
ORIENTATION: RIGHT

## 2022-04-09 ASSESSMENT — PAIN SCALES - GENERAL
PAINLEVEL_OUTOF10: 9
PAINLEVEL_OUTOF10: 0
PAINLEVEL_OUTOF10: 8
PAINLEVEL_OUTOF10: 0
PAINLEVEL_OUTOF10: 6
PAINLEVEL_OUTOF10: 8
PAINLEVEL_OUTOF10: 0
PAINLEVEL_OUTOF10: 7
PAINLEVEL_OUTOF10: 0
PAINLEVEL_OUTOF10: 0
PAINLEVEL_OUTOF10: 7
PAINLEVEL_OUTOF10: 8
PAINLEVEL_OUTOF10: 7

## 2022-04-09 ASSESSMENT — PAIN DESCRIPTION - DESCRIPTORS
DESCRIPTORS: SORE
DESCRIPTORS: SORE

## 2022-04-09 ASSESSMENT — PAIN DESCRIPTION - ONSET: ONSET: SUDDEN

## 2022-04-09 ASSESSMENT — PAIN DESCRIPTION - LOCATION
LOCATION: CHEST

## 2022-04-09 ASSESSMENT — PAIN DESCRIPTION - PAIN TYPE
TYPE: SURGICAL PAIN

## 2022-04-09 ASSESSMENT — PAIN SCALES - WONG BAKER: WONGBAKER_NUMERICALRESPONSE: 4

## 2022-04-09 ASSESSMENT — PAIN DESCRIPTION - FREQUENCY: FREQUENCY: INTERMITTENT

## 2022-04-09 NOTE — PROGRESS NOTES
Shift: 0166-1045    Nursing assessment at handoff  stable    Rhythm changes Atrial Fibrillation/ controlled    Rhythm Intervention: n/a    Drop in Urinary Output no , 1060 mL output  Changes to drips? none  POD 1 ONLY: Pacing Wires Removed: []Yes           [] NO        [] Platelets < 88,790        [] Arrhythmia        [] Bradycardia        [x] Valve Replacement         [] Pacing for Cardiac Index    LDA Insertion Date Date Changed (if needed) Discontinued Date   Art line   4/8/22   Central Line   4/8/22   ET Tube      Panchal   4/8/22   Chest Tube   4/8/22   Leg Drain      Pacing Wires        Surgery, return to OR no     Hospital Course:  POD# 1    POD #2    CT, A-line, IJ, Panchal, removed without complications    POD #2 - Night Shift    Changes in O2 requirements   RA during day, desat to 85% when sleeping   Oxygen Therapy  SpO2: 98 %  Pulse Oximeter Device Mode: Continuous  Pulse Oximeter Device Location: Finger  O2 Device: Nasal cannula  O2 Flow Rate (L/min): 3 L/min     Most recent vitals BP (!) 104/55   Pulse 78   Temp 98.2 °F (36.8 °C) (Oral)   Resp 18   Ht 5' (1.524 m)   Wt 106 lb 11.2 oz (48.4 kg)   SpO2 98%   BMI 20.84 kg/m²       Admission weight Weight: 107 lb (48.5 kg)     Today's weight   Wt Readings from Last 1 Encounters:   04/09/22 106 lb 11.2 oz (48.4 kg)         Lab Data:  CBC:   Recent Labs     04/09/22  0415   WBC 10.4   HGB 11.8*   HCT 34.5*   MCV 95.0        BMP:    Recent Labs     04/09/22  0415   *   K 4.9   CO2 29   BUN 29*   CREATININE 0.6     LIVR:   No results for input(s): AST, ALT in the last 72 hours. PT/INR:   No results for input(s): PROT, INR in the last 72 hours. APTT: No results for input(s): APTT in the last 72 hours.     Drip rates at handoff:   none      Electronically signed by Gerry Boswell RN  on 4/9/22 at 6:00 AM EDT

## 2022-04-09 NOTE — PROGRESS NOTES
CVTS Cardiothoracic Progress Note:                                CC:  Post op follow up     Surgery: 4/6 MINIMAL ACCESS MITRAL VALVE REPAIR USING HUTCHINSON 38MM PHYSIO II ANNULOPLASTY RING, RIGHT SIDED MAZE, LEFT ATERIAL APPENDAGE CLIP PLACEMENT, CRYO NERVE ABLATION, ON PUMP, WITH BILATERAL PECTORALIS BLOCKS, XAVIER, AND TEMPORARY PACING [facility] course:  4/7 Hemodynamically stable overnight. On low dose levo for BP support. 4/8 No acute events overnight. BPs soft but adequate. Remains in atrial fibrillation.    4/9 doing well      Past Medical History:   Diagnosis Date    Varicose vein         Past Surgical History:   Procedure Laterality Date    MITRAL VALVE REPAIR N/A 4/6/2022    MINIMAL ACCESS MITRAL VALVE REPAIR USING HUTCHINSON 38MM PHYSIO II ANNULOPLASTY RING, RIGHT SIDED MAZE, LEFT ATERIAL APPENDAGE CLIP PLACEMENT, CRYO NERVE ABLATION, ON PUMP, WITH BILATERAL PECTORALIS BLOCKS, XAVIER, AND TEMPORARY PACING WIRE PLACEMENT performed by Caroleen Halsted, MD at [address] [place] [address] as of 03/30/2022 - Fully Reviewed 03/30/2022   Allergen Reaction Noted    Dairycare [lactase-lactobacillus]  03/30/2022    Eggs or egg-derived products  03/30/2022        Patient Active Problem List   Diagnosis    Atrial fibrillation with RVR ([address]    New onset atrial fibrillation (HCC)    Decompensated heart failure (HCC)    Pleural effusion, bilateral    LAFB (left anterior fascicular block)    Cardiomegaly    Pansystolic murmur    Mitral valve insufficiency        Vital Signs: BP (!) 96/49   Pulse 95   Temp 98.4 °F (36.9 °C) (Oral)   Resp 18   Ht 5' (1.524 m)   Wt 106 lb 11.2 oz (48.4 kg)   SpO2 98%   BMI 20.84 kg/m²  O2 Flow Rate (L/min): 3 L/min     Admission Weight: Weight: 107 lb (48.5 kg)    Weight on 4/6 (43.6 kg) Pre-op   Weight on 4/7 (46.2 kg) +2.6 kg   4/8 47.3 kg +3.7 kg     Intake/Output:     Intake/Output Summary (Last 24 hours) at 4/9/2022 0703  Last data filed at 4/9/2022 0400  Gross per 24 hour   Intake 360 ml   Output 1360 ml   Net -1000 ml        LABORATORY DATA:     CBC:   Recent Labs     04/07/22  0410 04/08/22 0412 04/09/22 0415   WBC 8.6 10.4 10.4   HGB 10.9* 11.7* 11.8*   HCT 32.6* 34.9* 34.5*   MCV 94.9 94.7 95.0   PLT 89* 129* 145     BMP:   Recent Labs     04/07/22  0410 04/08/22 0412 04/09/22 0415   * 126* 122*   K 5.0 4.2 4.9    91* 87*   CO2 28 27 29   BUN 22* 21* 29*   CREATININE 0.6 0.6 0.6     MG:    Recent Labs     04/07/22 0410 04/08/22 0412 04/09/22 0415   MG 2.20 1.70* 2.00    PT/INR:   No results for input(s): PROTIME, INR in the last 72 hours. CXR: 4/8/22  FINDINGS:   Left internal jugular Cordis extends just inferior to the left clavicular   head, indeterminate in location, though similar to prior.  Left atrial   exclusion device and valve replacement.  Right chest tube is seen extending   to the apex before extending to the medial right base.  Heterogeneous opacity   within the right lung is slightly greater than prior.  Small right pleural   effusion.  Developing left basilar pleuroparenchymal opacity as compared to   prior.  Moderate soft tissue emphysema at the right chest wall and right   supraclavicular region, similar to prior.           Impression   Bilateral atelectasis/airspace disease, increased on the right and new at the   left base.  New small left pleural effusion and persistent small right   pleural effusion. ________________________________________________________________________    Subjective:   Dietary Intake: needs improvement    no Nausea   Pain Control: needs improvement   Complaints: expected post operative pain   Bowels: no BM     Objective:   General appearance: resting in bed, in nad   Lungs: diminished bilateral bases   Heart: S1S2 normal, irregularly irregular; controlled afib on monitor  Chest: symmetrical expansion with inspiration and expirations; no rocking of sternum noted.  Right chest wall crepitus   Abdomen: soft, non-tender  Bowel sounds: normoactive   Kidneys: -225-831=0601 ml over 24 hours; Cr 0.6  Wound/Incisions: Right chest wall incision with dressing CDI; pacing wires out 4/7  Extremities: BLE pulses present; trace swelling noted in BLE  Neurological: alert, oriented and grossly non-focal   Chest tubes/Drains: Chest tube # 1 with -384=540 ml serosanguinous drainage in 24 hours; small airleak noted      Assessment:   Post-op: 2 days. Condition: In stable condition. Plan:   1. Cardiovascular: s/p MVr with annuloplasty ring, right sided MAZE, ISAAC clip   ASA, statin, BB  Chronic, persistent atrial fibrillation with gigantic left atrium- rate controlled. On dig, BB, xarelto     2. Pulmonary:   Back on 1L NC this morning, wean as able   Expansion measures- IS, acapella, oobtc, ambulation  Albuterol every 4 hours while awake. Hypertonic nebs. EZPAP     3. Neurology:   Post operative pain- PRN tylenol, oxy. Still with moderate post op pain- trial scheduled gabapentin, increase today    4. Nephrology:   Weight still up from pre-op  Adequate 24 hour UOP with Cr of 0.6  Diurese as tolerated- continue IV lasix till 4/10 and switch to PO    5. Endocrinology:   Glucose stable on Lantus and SSI   No hx of DM. A1C 5.6 on 4/6    6. Hematology:   Acute blood loss anemia- H&H stable  Post operative thrombocytopenia- plts improved at 129    7. Microbiology:   No issues at present     8. Nutrition:   Continue diet as ordered. ONS     9. Labs:   AM labs and imaging reviewed as above   Hyponatremia- na 122. 1500 ml fluid restriction. Monitor. asymptomatic    10. Post-op Drains/Wires: Keeping chest tube for today. Remove arterial line and CVC. Left IJ CVC (4/6)   Left brachial caesar (4/6)     11. D/C Goals: CM following. Will await PT/OT recs. Pt lives at home alone PTA and does not have any family members that would be able to stay with her following surgery.      12. Continue post-op care of patient in the ICU. Improving.  Watch Na    GI prophylaxis: pepcid   DVT prophylaxis: xarelto   ________________________________________________________________________  Bill Feldman MD  4/9/2022  7:03 AM

## 2022-04-09 NOTE — PROGRESS NOTES
Occupational Therapy  Facility/Department: [facility] C2 CARD TELEMETRY  Daily Treatment Note  NAME: Jed Cox  : 1955  MRN: [id number]    Date of Service: 2022    Discharge Recommendations:  Subacute/Skilled Nursing Facility  OT Equipment Recommendations  Equipment Needed: Yes  Mobility Devices: ADL Assistive Devices  ADL Assistive Devices: Shower Chair with back    Assessment   Performance deficits / Impairments: Decreased functional mobility ; Decreased balance;Decreased ADL status; Decreased endurance;Decreased high-level IADLs;Decreased safe awareness;Decreased strength  Assessment: Pt demos good progress toward OT goals, SBA/S with functional transfers and mobility in room without AD, pt appears more steady with no LOB. Previous date no report of family assist available, today pt reports she can go stay with nephew for a few days, at this time recommend pt return home with 24 hr A and [address]. Continue OT per POC. Prognosis: Good  OT Education: OT Role;Plan of Care;Energy Conservation; ADL Adaptive Strategies;Transfer Training;Home Exercise Program  Disease Specific Education: Pt educated on importance of OOB mobility, prevention of complications of bedrest, and general safety during hospitalization. Pt verbalized understanding. Barriers to Learning: none perceived  REQUIRES OT FOLLOW UP: Yes  Activity Tolerance  Activity Tolerance: Patient Tolerated treatment well  Activity Tolerance: Vitals: BP= 107/71, HR= 69, SPO2= 99% RA  Safety Devices  Safety Devices in place: Yes  Type of devices: Call light within reach;Nurse notified; Left in chair;Gait belt         Patient Diagnosis(es): The encounter diagnosis was Atrial fibrillation with RVR ([address] has a past medical history of Varicose vein. has a past surgical history that includes Tonsillectomy and Mitral valve repair (N/A, 2022).     Restrictions  Restrictions/Precautions  Restrictions/Precautions: Fall Risk,General Precautions  Position Activity Restriction  Other position/activity restrictions: progressive ambulation, Sternal precuations in chart, per RN no sternal precautions d/t minimally invasive sx     Subjective   General  Chart Reviewed: Yes  Patient assessed for rehabilitation services?: Yes  Response to previous treatment: Patient with no complaints from previous session  Family / Caregiver Present: No  Diagnosis: Afib with RVR, chest pain, pulmonary edema s/p minimally invasive MVR, partial R MAZE, ISAAC clip on 4/6 by Dr. Ashly Franks    Subjective  Subjective: Pt at EOB with RN at approach, agreeable to OT treatment. Pain Assessment  Pain Assessment: Faces  Mcgee-Baker Pain Rating: Hurts little more  Pain Type: Surgical pain  Pain Location: Chest  Pain Orientation: Right  Non-Pharmaceutical Pain Intervention(s): Repositioned; Ambulation/Increased Activity  Vital Signs  Patient Currently in Pain: Yes     Orientation  Orientation  Overall Orientation Status: Within Functional Limits     Objective    ADL  LE Dressing: Supervision  Additional Comments: Pt reports toileting self independently prior to writer's entrance.      Balance  Sitting Balance: Supervision  Standing Balance: Supervision  Standing Balance  Activity: in room mobility, transfer training 10reps, 2 sets from chair  Comment: no AD used    Functional Mobility  Functional - Mobility Device: No device  Activity: Other  Assist Level: Stand by assistance     Transfers  Sit to stand: Supervision  Stand to sit: Supervision     Cognition  Overall Cognitive Status: WFL     Type of ROM/Therapeutic Exercise  Type of ROM/Therapeutic Exercise: AROM  Comment: in standing  Exercises  Shoulder Flexion: 20x  Elbow Flexion: 20x  Elbow Extension: 20x  Supination: 20x  Pronation: 20x  Wrist Flexion: 20x  Wrist Extension: 20x  Finger Flexion: 20x  Finger Extension: 20x     Plan   Plan  Times per week: 4-6x/week  Current Treatment Recommendations: Pain Management,Positioning,Gait Training,Safety Education & Training,Balance Training,Patient/Caregiver Education & Training,Self-Care / ADL,Functional Mobility Training,Equipment Evaluation, Education, & procurement,Home Management Training,Endurance Training    AM-PAC Score  AM-PAC Inpatient Daily Activity Raw Score: 19 (04/09/22 0922)  AM-PAC Inpatient ADL T-Scale Score : 40.22 (04/09/22 8423)  ADL Inpatient CMS 0-100% Score: 42.8 (04/09/22 0922)  ADL Inpatient CMS G-Code Modifier : CK (04/09/22 5705)    Goals  Short term goals  Time Frame for Short term goals: 2 weeks (4/21/22)  Short term goal 1: Mod I functional transfers to/from EOB, chair, toilet-- ongoing 4/9/22  Short term goal 2: Mod I toileting-- ongoing 4/9/22  Short term goal 3: Mod I LB dressing-- ongoing 4/9/22  Short term goal 4: Mod I grooming standing at sink-- ongoing 4/9/22  Short term goal 5: Mod I UB dressing.-- ongoing 4/9/22  Patient Goals   Patient goals : to be able to return home at discharge       Therapy Time   Individual Concurrent Group Co-treatment   Time In 0850         Time Out 0915         Minutes [address], [place]/L

## 2022-04-09 NOTE — PLAN OF CARE
Problem: OXYGENATION/RESPIRATORY FUNCTION  Goal: Patient will maintain patent airway  Outcome: Ongoing  Goal: Patient will achieve/maintain normal respiratory rate/effort  Description: Respiratory rate and effort will be within normal limits for the patient  Outcome: Ongoing     Problem: HEMODYNAMIC STATUS  Goal: Patient has stable vital signs and fluid balance  Outcome: Ongoing     Problem: FLUID AND ELECTROLYTE IMBALANCE  Goal: Fluid and electrolyte balance are achieved/maintained  Outcome: Ongoing     Problem: ACTIVITY INTOLERANCE/IMPAIRED MOBILITY  Goal: Mobility/activity is maintained at optimum level for patient  Outcome: Ongoing     Problem: Falls - Risk of:  Goal: Will remain free from falls  Description: Will remain free from falls  Outcome: Ongoing  Goal: Absence of physical injury  Description: Absence of physical injury  Outcome: Ongoing     Problem: Pain:  Goal: Pain level will decrease  Description: Pain level will decrease  Outcome: Ongoing  Goal: Control of acute pain  Description: Control of acute pain  Outcome: Ongoing  Goal: Control of chronic pain  Description: Control of chronic pain  Outcome: Ongoing     Problem: Nutrition  Goal: Optimal nutrition therapy  Outcome: Ongoing     Fabiola Taylor RN

## 2022-04-09 NOTE — PLAN OF CARE
Problem: OXYGENATION/RESPIRATORY FUNCTION  Goal: Patient will maintain patent airway  Outcome: Ongoing  Goal: Patient will achieve/maintain normal respiratory rate/effort  Description: Respiratory rate and effort will be within normal limits for the patient  Outcome: Ongoing     Problem: HEMODYNAMIC STATUS  Goal: Patient has stable vital signs and fluid balance  Outcome: Ongoing     Problem: FLUID AND ELECTROLYTE IMBALANCE  Goal: Fluid and electrolyte balance are achieved/maintained  Outcome: Ongoing     Problem: ACTIVITY INTOLERANCE/IMPAIRED MOBILITY  Goal: Mobility/activity is maintained at optimum level for patient  Outcome: Ongoing     Problem: Falls - Risk of:  Goal: Will remain free from falls  Description: Will remain free from falls  Outcome: Ongoing  Goal: Absence of physical injury  Description: Absence of physical injury  Outcome: Ongoing     Problem: Pain:  Goal: Pain level will decrease  Description: Pain level will decrease  Outcome: Ongoing  Goal: Control of acute pain  Description: Control of acute pain  Outcome: Ongoing  Goal: Control of chronic pain  Description: Control of chronic pain  Outcome: Ongoing     Problem: Nutrition  Goal: Optimal nutrition therapy  Outcome: Ongoing

## 2022-04-10 LAB
ANION GAP SERPL CALCULATED.3IONS-SCNC: 8 MMOL/L (ref 3–16)
BUN BLDV-MCNC: 25 MG/DL (ref 7–20)
CALCIUM IONIZED: 1.1 MMOL/L (ref 1.12–1.32)
CALCIUM SERPL-MCNC: 8.8 MG/DL (ref 8.3–10.6)
CHLORIDE BLD-SCNC: 85 MMOL/L (ref 99–110)
CO2: 33 MMOL/L (ref 21–32)
CREAT SERPL-MCNC: 0.8 MG/DL (ref 0.6–1.2)
GFR AFRICAN AMERICAN: >60
GFR NON-AFRICAN AMERICAN: >60
GLUCOSE BLD-MCNC: 100 MG/DL (ref 70–99)
GLUCOSE BLD-MCNC: 125 MG/DL (ref 70–99)
GLUCOSE BLD-MCNC: 77 MG/DL (ref 70–99)
GLUCOSE BLD-MCNC: 94 MG/DL (ref 70–99)
HCT VFR BLD CALC: 34.9 % (ref 36–48)
HEMOGLOBIN: 11.8 G/DL (ref 12–16)
MAGNESIUM: 2.1 MG/DL (ref 1.8–2.4)
MCH RBC QN AUTO: 31.4 PG (ref 26–34)
MCHC RBC AUTO-ENTMCNC: 33.7 G/DL (ref 31–36)
MCV RBC AUTO: 93.1 FL (ref 80–100)
PDW BLD-RTO: 13.1 % (ref 12.4–15.4)
PERFORMED ON: ABNORMAL
PERFORMED ON: ABNORMAL
PERFORMED ON: NORMAL
PH VENOUS: 7.44 (ref 7.35–7.45)
PLATELET # BLD: 197 K/UL (ref 135–450)
PMV BLD AUTO: 8 FL (ref 5–10.5)
POTASSIUM SERPL-SCNC: 4.2 MMOL/L (ref 3.5–5.1)
RBC # BLD: 3.75 M/UL (ref 4–5.2)
SODIUM BLD-SCNC: 126 MMOL/L (ref 136–145)
WBC # BLD: 9.6 K/UL (ref 4–11)

## 2022-04-10 PROCEDURE — 6370000000 HC RX 637 (ALT 250 FOR IP): Performed by: STUDENT IN AN ORGANIZED HEALTH CARE EDUCATION/TRAINING PROGRAM

## 2022-04-10 PROCEDURE — 6360000002 HC RX W HCPCS: Performed by: THORACIC SURGERY (CARDIOTHORACIC VASCULAR SURGERY)

## 2022-04-10 PROCEDURE — 94669 MECHANICAL CHEST WALL OSCILL: CPT

## 2022-04-10 PROCEDURE — 82330 ASSAY OF CALCIUM: CPT

## 2022-04-10 PROCEDURE — 6370000000 HC RX 637 (ALT 250 FOR IP): Performed by: NURSE PRACTITIONER

## 2022-04-10 PROCEDURE — 94761 N-INVAS EAR/PLS OXIMETRY MLT: CPT

## 2022-04-10 PROCEDURE — 6370000000 HC RX 637 (ALT 250 FOR IP): Performed by: THORACIC SURGERY (CARDIOTHORACIC VASCULAR SURGERY)

## 2022-04-10 PROCEDURE — 94640 AIRWAY INHALATION TREATMENT: CPT

## 2022-04-10 PROCEDURE — 85027 COMPLETE CBC AUTOMATED: CPT

## 2022-04-10 PROCEDURE — 36415 COLL VENOUS BLD VENIPUNCTURE: CPT

## 2022-04-10 PROCEDURE — 80048 BASIC METABOLIC PNL TOTAL CA: CPT

## 2022-04-10 PROCEDURE — 83735 ASSAY OF MAGNESIUM: CPT

## 2022-04-10 PROCEDURE — 2580000003 HC RX 258: Performed by: NURSE PRACTITIONER

## 2022-04-10 PROCEDURE — 2580000003 HC RX 258: Performed by: THORACIC SURGERY (CARDIOTHORACIC VASCULAR SURGERY)

## 2022-04-10 PROCEDURE — 97116 GAIT TRAINING THERAPY: CPT

## 2022-04-10 PROCEDURE — 2700000000 HC OXYGEN THERAPY PER DAY

## 2022-04-10 PROCEDURE — 2140000000 HC CCU INTERMEDIATE R&B

## 2022-04-10 PROCEDURE — 99024 POSTOP FOLLOW-UP VISIT: CPT | Performed by: STUDENT IN AN ORGANIZED HEALTH CARE EDUCATION/TRAINING PROGRAM

## 2022-04-10 RX ORDER — FUROSEMIDE 40 MG/1
40 TABLET ORAL 2 TIMES DAILY
Status: DISCONTINUED | OUTPATIENT
Start: 2022-04-10 | End: 2022-04-11

## 2022-04-10 RX ADMIN — ALBUTEROL SULFATE 2.5 MG: 2.5 SOLUTION RESPIRATORY (INHALATION) at 20:09

## 2022-04-10 RX ADMIN — GABAPENTIN 200 MG: 100 CAPSULE ORAL at 16:04

## 2022-04-10 RX ADMIN — ASPIRIN 81 MG: 81 TABLET, COATED ORAL at 08:39

## 2022-04-10 RX ADMIN — METOPROLOL TARTRATE 25 MG: 25 TABLET, FILM COATED ORAL at 08:39

## 2022-04-10 RX ADMIN — GABAPENTIN 200 MG: 100 CAPSULE ORAL at 08:39

## 2022-04-10 RX ADMIN — Medication 400 MG: at 08:39

## 2022-04-10 RX ADMIN — ALBUTEROL SULFATE 2.5 MG: 2.5 SOLUTION RESPIRATORY (INHALATION) at 07:58

## 2022-04-10 RX ADMIN — SPIRONOLACTONE 25 MG: 25 TABLET ORAL at 08:44

## 2022-04-10 RX ADMIN — TRAMADOL HYDROCHLORIDE 50 MG: 50 TABLET, COATED ORAL at 14:03

## 2022-04-10 RX ADMIN — FAMOTIDINE 20 MG: 20 TABLET ORAL at 21:03

## 2022-04-10 RX ADMIN — GABAPENTIN 200 MG: 100 CAPSULE ORAL at 21:03

## 2022-04-10 RX ADMIN — MUPIROCIN: 20 OINTMENT TOPICAL at 08:39

## 2022-04-10 RX ADMIN — Medication 15 ML: at 08:42

## 2022-04-10 RX ADMIN — SODIUM CHLORIDE SOLN NEBU 3% 4 ML: 3 NEBU SOLN at 07:58

## 2022-04-10 RX ADMIN — FUROSEMIDE 40 MG: 40 TABLET ORAL at 16:59

## 2022-04-10 RX ADMIN — POTASSIUM CHLORIDE 10 MEQ: 10 TABLET, EXTENDED RELEASE ORAL at 16:04

## 2022-04-10 RX ADMIN — RIVAROXABAN 20 MG: 20 TABLET, FILM COATED ORAL at 18:32

## 2022-04-10 RX ADMIN — FUROSEMIDE 40 MG: 40 TABLET ORAL at 08:39

## 2022-04-10 RX ADMIN — POTASSIUM CHLORIDE 10 MEQ: 10 TABLET, EXTENDED RELEASE ORAL at 12:55

## 2022-04-10 RX ADMIN — MUPIROCIN: 20 OINTMENT TOPICAL at 21:03

## 2022-04-10 RX ADMIN — FAMOTIDINE 20 MG: 20 TABLET ORAL at 08:41

## 2022-04-10 RX ADMIN — Medication 15 ML: at 21:03

## 2022-04-10 RX ADMIN — SERTRALINE 25 MG: 50 TABLET, FILM COATED ORAL at 08:39

## 2022-04-10 RX ADMIN — DIGOXIN 125 MCG: 125 TABLET ORAL at 08:39

## 2022-04-10 RX ADMIN — METOPROLOL TARTRATE 25 MG: 25 TABLET, FILM COATED ORAL at 21:03

## 2022-04-10 RX ADMIN — ALBUTEROL SULFATE 2.5 MG: 2.5 SOLUTION RESPIRATORY (INHALATION) at 16:07

## 2022-04-10 RX ADMIN — Medication 400 MG: at 21:03

## 2022-04-10 RX ADMIN — ATORVASTATIN CALCIUM 40 MG: 40 TABLET, FILM COATED ORAL at 21:05

## 2022-04-10 RX ADMIN — SODIUM CHLORIDE, PRESERVATIVE FREE 10 ML: 5 INJECTION INTRAVENOUS at 21:04

## 2022-04-10 RX ADMIN — INSULIN GLARGINE 7 UNITS: 100 INJECTION, SOLUTION SUBCUTANEOUS at 21:06

## 2022-04-10 RX ADMIN — SODIUM CHLORIDE SOLN NEBU 3% 4 ML: 3 NEBU SOLN at 20:09

## 2022-04-10 RX ADMIN — SODIUM CHLORIDE, PRESERVATIVE FREE 10 ML: 5 INJECTION INTRAVENOUS at 08:44

## 2022-04-10 RX ADMIN — POTASSIUM CHLORIDE 10 MEQ: 10 TABLET, EXTENDED RELEASE ORAL at 08:40

## 2022-04-10 ASSESSMENT — PAIN SCALES - GENERAL
PAINLEVEL_OUTOF10: 6
PAINLEVEL_OUTOF10: 6
PAINLEVEL_OUTOF10: 5
PAINLEVEL_OUTOF10: 0
PAINLEVEL_OUTOF10: 5
PAINLEVEL_OUTOF10: 4
PAINLEVEL_OUTOF10: 0

## 2022-04-10 ASSESSMENT — PAIN DESCRIPTION - PROGRESSION
CLINICAL_PROGRESSION: NOT CHANGED

## 2022-04-10 ASSESSMENT — PAIN DESCRIPTION - PAIN TYPE: TYPE: SURGICAL PAIN

## 2022-04-10 ASSESSMENT — PAIN DESCRIPTION - FREQUENCY: FREQUENCY: INTERMITTENT

## 2022-04-10 ASSESSMENT — PAIN - FUNCTIONAL ASSESSMENT: PAIN_FUNCTIONAL_ASSESSMENT: PREVENTS OR INTERFERES SOME ACTIVE ACTIVITIES AND ADLS

## 2022-04-10 ASSESSMENT — PAIN DESCRIPTION - LOCATION: LOCATION: CHEST

## 2022-04-10 ASSESSMENT — PAIN DESCRIPTION - DESCRIPTORS: DESCRIPTORS: SORE

## 2022-04-10 ASSESSMENT — PAIN DESCRIPTION - ONSET: ONSET: SUDDEN

## 2022-04-10 ASSESSMENT — PAIN DESCRIPTION - ORIENTATION: ORIENTATION: RIGHT

## 2022-04-10 NOTE — OP NOTE
Operative Note      Patient: Alex Hernandez  YOB: 1955  MRN: [id number]    Date of Procedure: 4/6/2022    Pre-Op Diagnosis: -Severe myxomatous mitral valve regurg, atrial fibrillation with RVR. Left anterior fasciculation block. Cardiomegaly. Post-Op Diagnosis: Same       Procedure(s): MINIMAL ACCESS MITRAL VALVE REPAIR USING HUTCHINSON 38MM PHYSIO II ANNULOPLASTY RING, RIGHT SIDED MAZE, LEFT ATERIAL APPENDAGE CLIP PLACEMENT, CRYO NERVE ABLATION, ON PUMP, WITH BILATERAL PECTORALIS BLOCKS, XAVIER, AND TEMPORARY PACING WIRE PLACEMENT    Surgeon(s):  Krysta Garg MD    Assistant:   Surgical Assistant: Lizy Duffy  Physician Assistant: OPAL Stewart PA was present during the entire procedure for surgeons first assist due to the complexity of the procedure. Anesthesia: General    Estimated Blood Loss (mL): 758    Complications: None  Implants:  Implant Name Type Inv. Item Serial No.  Lot No. LRB No. Used Action   RING ANNULPLSTY [id number] MI VLV ANG HLDR PHY II - [id number] Annuloplasty rings RING ANNULPLSTY [id number] MI VLV ANG HLDR PHY II [id number] [employer]UTCHINSON [employer]  N/A 1 Implanted   DEVICE OCCL CLP L45MM ISAAC EXCLUSION SYS ATRICLP PRO V - [id number]  DEVICE OCCL CLP L45MM ISAAC EXCLUSION SYS ATRICLP PRO V  ATRICURE INC-WD [id number] N/A 1 Implanted        Findings: Mitral regurg with prolapse posterior leaflet P2 cord rupture end of the procedure there is a trace leak with obliteration of left appendage    Detailed Description of Procedure:   he was taken to the operating room after informed consent was obtained lying supine under anesthesia ET tube was placed with no difficulty bronchoscopy was performed to confirm the double-lumen tube. Transesophageal echo was performed showed finding consistent with preoperative transesophageal echo. Postoperatively showed patent normal ventricular function with no regurg.   Patient was prepped and draped in standard fashion possible glucose was done in standard manner. Timeout was performed in appropriate fashion. 6 cm incision was performed anterior thoracotomy. At the fourth intercostal space intercostal block was performed to 3 space above and below. Nerve block was injected to the intercostal space plus pectoralis block to the right side. Patient was heparinized cutdown onto the right femoralartery and vein was performed pursestring was placed wire was placed through the arterial side visualized by transesophageal echo cannulation was performed femoral vein pursestring was performed wire was placed and visualized by transesophageal echo cannula was placed in top of them wire. Patient was placed on cardiopulmonary bypass cardioplegia was placed through an ascending aorta diastolic arrest was achieved   left atrium was opened on assessment is myxomatous valve measured roughly 38 cryoablation's for left side maze was performed roofline was applied to complete 2 minutes basal Was applied for completion of 2 minutes and line to exclude pulmonary vein left side was extended line was extended to the bottom of the atrial appendage. There is elongation of the posterior leaflet chordae with no ruptured. Probe was removed our attention was turned to the mitral valve suture was placed around the annulus using 15 sutures. New chordae was placed at the base of the posterior medial papillary muscle 3 cord was placed around the ruptured cord size 16mm 45 Edward ring was placed cinched down by cor knot. No leakage could be visualized. Left atrium was closed by 2 layer running suture. Ascending aorta was elevated appendage was clearly seen and clip size 45 was placed and deployed transesophageal echo showed no flow. De-airing was performed hotshot was administered 2 atrial wire to ventricular wire was placed patient weaned slowly off cardiopulmonary bypass with no difficulty tolerated well.   Common femoral artery right side have diminished flow after cinching down the arterial cannula pursestring side biter was applied pursestring was removed interrupted suture closure of the artery was performed good flow with good pulse was dopplered groin incision was closed in 3 layer counts were told was correct patient was dispositioned to recovery room secondary to complications     Electronically signed by Joni Quinones MD on 4/10/2022 at 7:17 PM

## 2022-04-10 NOTE — PROGRESS NOTES
4 Eyes Skin Assessment     The patient is being assess for  Shift Handoff    I agree that 2 RN's have performed a thorough Head to Toe Skin Assessment on the patient. ALL assessment sites listed below have been assessed. Areas assessed by both nurses:   [x]   Head, Face, and Ears   [x]   Shoulders, Back, and Chest  [x]   Arms, Elbows, and Hands   [x]   Coccyx, Sacrum, and Ischum  [x]   Legs, Feet, and Heels        Does the Patient have Skin Breakdown?   No         Paras Prevention initiated:  Yes   Wound Care Orders initiated:  No      [facility] nurse consulted for Pressure Injury (Stage 3,4, Unstageable, DTI, NWPT, and Complex wounds):  NA      Nurse 1 eSignature: Electronically signed by Radha Mott RN on 4/08/22 at 19:10 PM EDT    **SHARE this note so that the co-signing nurse is able to place an eSignature**    Nurse 2 eSignature: {Esignature:[id number]}

## 2022-04-10 NOTE — PROGRESS NOTES
Physical Therapy  Facility/Department: [facility] C2 CARD TELEMETRY  Daily Treatment Note and Discharge Summary  NAME: Nereida Courser  : 1955  MRN: [id number]    Date of Service: 4/10/2022    Discharge Recommendations:  24 hour supervision or assist   PT Equipment Recommendations  Equipment Needed: No    Assessment   Assessment: Pt has progressed well with therapy. Supervision for mobility this date without device. Pt declines concerns about mobility at home. Plans to stay with nephew. Will d/c acute PT d/t supervision with mobility and goals met. Treatment Diagnosis: impaired mobility  Prognosis: Good  Decision Making: High Complexity  PT Education: Goals;Gait Training;PT Role;Disease Specific Education;Plan of Care; Functional Mobility Training;Home Exercise Program;Transfer Training;General Safety  Patient Education: Pt educated on importance of continued mobility while in acute care and safety with transfers/mobility- verbalizes understanding  Barriers to Learning: no  REQUIRES PT FOLLOW UP: No  Activity Tolerance  Activity Tolerance: Patient Tolerated treatment well  Activity Tolerance: /66, HR 79; SpO2 93% on RA     Patient Diagnosis(es): The encounter diagnosis was Atrial fibrillation with RVR ([address] has a past medical history of Varicose vein. has a past surgical history that includes Tonsillectomy and Mitral valve repair (N/A, 2022). Restrictions  Restrictions/Precautions  Restrictions/Precautions: Fall Risk,General Precautions  Position Activity Restriction  Other position/activity restrictions: progressive ambulation, Sternal precuations in chart, per RN no sternal precautions d/t minimally invasive sx  Subjective   General  Chart Reviewed: Yes  Response To Previous Treatment: Patient with no complaints from previous session.   Family / Caregiver Present: No  Referring Practitioner: Irena Carroll MD  Subjective  Subjective: Pt seated EOB on approach; agreeable to therapy  General Comment  Comments: RN cleared pt for therapy  Pain Screening  Patient Currently in Pain: Denies       Objective   Bed mobility  Comment: Pt seated EOB at start and end of session -- declines concerns about bed mobility     Transfers  Sit to Stand: Independent  Stand to sit: Independent     Ambulation  Ambulation?: Yes  Ambulation 1  Surface: level tile  Device: No Device  Assistance: Supervision  Quality of Gait: Supervision. 2 mild lateral LOB, pt able to self correct  Distance: 350 ft  Stairs/Curb  Stairs?: Yes  Stairs  # Steps : 4  Stairs Height: 6\"  Rails: Left ascending  Assistance: Supervision  Comment: Reciprocal pattern, no LOB                         AM-PAC Score  AM-PAC Inpatient Mobility Raw Score : 22 (04/10/22 0933)  AM-PAC Inpatient T-Scale Score : 53.28 (04/10/22 0933)  Mobility Inpatient CMS 0-100% Score: 20.91 (04/10/22 0933)  Mobility Inpatient CMS G-Code Modifier : Ronnie Munoz (04/10/22 8428)          Goals  Short term goals  Time Frame for Short term goals: 4/13/22 unless noted  Short term goal 1: Pt will perform bed mobility with supervision by 4/12/22 -- GOAL MET 4/10  Short term goal 2: Pt will perform transfers with supervision -- 4/8 Goal Met  Short term goal 3: Pt will ambulate 150 ft with LRAD or no AD and supervision -- GOAL MET 4/10  Short term goal 4: Pt will negotiate 2 stairs with 1 handrail and CGA-- GOAL MET 4/10  Patient Goals   Patient goals : \"to go home\"    Plan    Plan  Times per week: D/c acute PT  Current Treatment Recommendations: Strengthening,Home Exercise Program,ROM,Balance Training,Endurance Training,Functional Mobility Training,Transfer Training,Gait Training,Stair training  Safety Devices  Type of devices: All fall risk precautions in place,Call light within reach,Nurse notified (pt left seated EOB at end of session.  No alarm on approach)     Therapy Time   Individual Concurrent Group Co-treatment   Time In 0906         Time Out 0930         Minutes 24         Timed Code

## 2022-04-10 NOTE — PROGRESS NOTES
Shift: 1807-1330    Nursing assessment at handoff  stable    Rhythm changes Atrial Fibrillation/ controlled    Rhythm Intervention: n/a    Drop in Urinary Output no , 1060 mL output  Changes to drips? none  POD 1 ONLY: Pacing Wires Removed: []Yes           [] NO        [] Platelets < 80,739        [] Arrhythmia        [] Bradycardia        [x] Valve Replacement         [] Pacing for Cardiac Index    LDA Insertion Date Date Changed (if needed) Discontinued Date   Art line   4/8/22   Central Line   4/8/22   ET Tube      Panchal   4/8/22   Chest Tube   4/8/22   Leg Drain      Pacing Wires        Surgery, return to OR no     Hospital Course:  POD# 1    POD #2    CT, A-line, IJ, Panchal, removed without complications    POD #2 - Night Shift    POD #3 - Night Shift -   Controlled A-Fib. Vital signs stable. Slightly hypotensive while asleep. Output from site where chest tube was removed has decreased. Dressing from day shift remained dry and intact throughout duration of night shift. Urine output = 1500 mL. Pain well controlled with tramadol overnight. No other significant changes. Changes in O2 requirements   RA during day, desat to 85% when sleeping   Oxygen Therapy  SpO2: 94 %  Pulse Oximeter Device Mode: Continuous  Pulse Oximeter Device Location: Finger  O2 Device: Nasal cannula  O2 Flow Rate (L/min): 3 L/min     Most recent vitals BP (!) 103/59   Pulse 71   Temp 98.2 °F (36.8 °C) (Axillary)   Resp 18   Ht 5' (1.524 m)   Wt 107 lb 9.4 oz (48.8 kg)   SpO2 94%   BMI 21.01 kg/m²       Admission weight Weight: 107 lb (48.5 kg)     Today's weight   Wt Readings from Last 1 Encounters:   04/10/22 107 lb 9.4 oz (48.8 kg)         Lab Data:  CBC:   Recent Labs     04/10/22  0410   WBC 9.6   HGB 11.8*   HCT 34.9*   MCV 93.1        BMP:    Recent Labs     04/10/22  0410   *   K 4.2   CO2 33*   BUN 25*   CREATININE 0.8     LIVR:   No results for input(s): AST, ALT in the last 72 hours.   PT/INR:   No results for input(s): PROT, INR in the last 72 hours. APTT: No results for input(s): APTT in the last 72 hours.     Drip rates at handoff:   none      Electronically signed by Felicia Katz RN  on 4/10/22 at 6:00 AM EDT

## 2022-04-10 NOTE — PROGRESS NOTES
Shift: 6605-6008    Nursing assessment at handoff  stable    Rhythm changes Atrial Fibrillation/ controlled    Rhythm Intervention: n/a    Drop in Urinary Output no , 950 mL output  Changes to drips? none  POD 1 ONLY: Pacing Wires Removed: []Yes           [] NO        [] Platelets < 16,990        [] Arrhythmia        [] Bradycardia        [x] Valve Replacement         [] Pacing for Cardiac Index    LDA Insertion Date Date Changed (if needed) Discontinued Date   Art line   4/8/22   Central Line   4/8/22   ET Tube      Panchal   4/8/22   Chest Tube   4/8/22   Leg Drain      Pacing Wires        Surgery, return to OR no     Hospital Course:  POD# 1    POD #2    CT, A-line, IJ, Panchal, removed without complications    POD #2 - Night Shift    POD #3 - Night Shift -   Controlled A-Fib. Vital signs stable. Slightly hypotensive while asleep. Output from site where chest tube was removed has decreased. Dressing from day shift remained dry and intact throughout duration of night shift. Urine output = 1500 mL. Pain well controlled with tramadol overnight. No other significant changes. POD #4 - day shift- pt stable Afib rate controlled. Ambulating to BR well. Minimal pain controlled. Urine output- 950mL. Chest tube Dressing dry and intact, changed 1800. VSS on RA.      Changes in O2 requirements   RA during day, desat to 85% when sleeping   Oxygen Therapy  SpO2: 100 %  Pulse Oximeter Device Mode: Continuous  Pulse Oximeter Device Location: Finger  O2 Device: None (Room air)  O2 Flow Rate (L/min): 3 L/min     Most recent vitals /68   Pulse 81   Temp 98.3 °F (36.8 °C) (Oral)   Resp 18   Ht 5' (1.524 m)   Wt 107 lb 9.4 oz (48.8 kg)   SpO2 100%   BMI 21.01 kg/m²       Admission weight Weight: 107 lb (48.5 kg)     Today's weight   Wt Readings from Last 1 Encounters:   04/10/22 107 lb 9.4 oz (48.8 kg)         Lab Data:  CBC:   Recent Labs     04/10/22  0410   WBC 9.6   HGB 11.8*   HCT 34.9*   MCV 93.1        BMP: Recent Labs     04/10/22  0410   *   K 4.2   CO2 33*   BUN 25*   CREATININE 0.8     LIVR:   No results for input(s): AST, ALT in the last 72 hours. PT/INR:   No results for input(s): PROT, INR in the last 72 hours. APTT: No results for input(s): APTT in the last 72 hours.     Drip rates at handoff:   none      Electronically signed by Brian Tan RN  on 4/10/22 at 6:46 PM EDT

## 2022-04-10 NOTE — PROGRESS NOTES
4 Eyes Skin Assessment     The patient is being assess for  Shift Handoff    I agree that 2 RN's have performed a thorough Head to Toe Skin Assessment on the patient. ALL assessment sites listed below have been assessed. Areas assessed by both nurses:   [x]   Head, Face, and Ears   [x]   Shoulders, Back, and Chest  [x]   Arms, Elbows, and Hands   [x]   Coccyx, Sacrum, and Ischum  [x]   Legs, Feet, and Heels        Does the Patient have Skin Breakdown?   No         Paras Prevention initiated:  Yes   Wound Care Orders initiated:  No      [facility] nurse consulted for Pressure Injury (Stage 3,4, Unstageable, DTI, NWPT, and Complex wounds):  NA      Nurse 1 eSignature: Electronically signed by Ric Colon RN on 4/09/22 at 19:10 PM EDT    **SHARE this note so that the co-signing nurse is able to place an eSignature**    Nurse 2 eSignature: {Esignature:[id number]}

## 2022-04-10 NOTE — PROGRESS NOTES
CVTS Cardiothoracic Progress Note:                                CC:  Post op follow up     Surgery: 4/6 MINIMAL ACCESS MITRAL VALVE REPAIR USING HUTCHINSON 38MM PHYSIO II ANNULOPLASTY RING, RIGHT SIDED MAZE, LEFT ATERIAL APPENDAGE CLIP PLACEMENT, CRYO NERVE ABLATION, ON PUMP, WITH BILATERAL PECTORALIS BLOCKS, XAVIER, AND TEMPORARY PACING [facility] course:  4/7 Hemodynamically stable overnight. On low dose levo for BP support. 4/8 No acute events overnight. BPs soft but adequate. Remains in atrial fibrillation.    4/9 doing well  4/10 much improved      Past Medical History:   Diagnosis Date    Varicose vein         Past Surgical History:   Procedure Laterality Date    MITRAL VALVE REPAIR N/A 4/6/2022    MINIMAL ACCESS MITRAL VALVE REPAIR USING HUTCHINSON 38MM PHYSIO II ANNULOPLASTY RING, RIGHT SIDED MAZE, LEFT ATERIAL APPENDAGE CLIP PLACEMENT, CRYO NERVE ABLATION, ON PUMP, WITH BILATERAL PECTORALIS BLOCKS, XAVIER, AND TEMPORARY PACING WIRE PLACEMENT performed by Maryanne Mendez MD at [address] [place] [address] as of 03/30/2022 - Fully Reviewed 03/30/2022   Allergen Reaction Noted    Dairycare [lactase-lactobacillus]  03/30/2022    Eggs or egg-derived products  03/30/2022        Patient Active Problem List   Diagnosis    Atrial fibrillation with RVR ([address]    New onset atrial fibrillation (HCC)    Decompensated heart failure (HCC)    Pleural effusion, bilateral    LAFB (left anterior fascicular block)    Cardiomegaly    Pansystolic murmur    Mitral valve insufficiency        Vital Signs: BP 99/63   Pulse 67   Temp 98.2 °F (36.8 °C) (Axillary)   Resp 16   Ht 5' (1.524 m)   Wt 107 lb 9.4 oz (48.8 kg)   SpO2 92%   BMI 21.01 kg/m²  O2 Flow Rate (L/min): 3 L/min     Admission Weight: Weight: 107 lb (48.5 kg)    Weight on 4/6 (43.6 kg) Pre-op   Weight on 4/7 (46.2 kg) +2.6 kg   4/8 47.3 kg +3.7 kg     Intake/Output:     Intake/Output Summary (Last 24 hours) at 4/10/2022 6785  Last data filed at 4/10/2022 0200  Gross per 24 hour   Intake 720 ml   Output 2560 ml   Net -1840 ml        LABORATORY DATA:     CBC:   Recent Labs     04/08/22  0412 04/09/22  0415 04/10/22  0410   WBC 10.4 10.4 9.6   HGB 11.7* 11.8* 11.8*   HCT 34.9* 34.5* 34.9*   MCV 94.7 95.0 93.1   * 145 197     BMP:   Recent Labs     04/08/22  0412 04/09/22  0415 04/10/22  0410   * 122* 126*   K 4.2 4.9 4.2   CL 91* 87* 85*   CO2 27 29 33*   BUN 21* 29* 25*   CREATININE 0.6 0.6 0.8     MG:    Recent Labs     04/08/22  0412 04/09/22  0415 04/10/22  0410   MG 1.70* 2.00 2.10    PT/INR:   No results for input(s): PROTIME, INR in the last 72 hours. CXR: 4/8/22  FINDINGS:   Left internal jugular Cordis extends just inferior to the left clavicular   head, indeterminate in location, though similar to prior.  Left atrial   exclusion device and valve replacement.  Right chest tube is seen extending   to the apex before extending to the medial right base.  Heterogeneous opacity   within the right lung is slightly greater than prior.  Small right pleural   effusion.  Developing left basilar pleuroparenchymal opacity as compared to   prior.  Moderate soft tissue emphysema at the right chest wall and right   supraclavicular region, similar to prior.           Impression   Bilateral atelectasis/airspace disease, increased on the right and new at the   left base.  New small left pleural effusion and persistent small right   pleural effusion.      ________________________________________________________________________    Subjective:   Dietary Intake: needs improvement    no Nausea   Pain Control: needs improvement   Complaints: expected post operative pain   Bowels: no BM     Objective:   General appearance: resting in bed, in nad   Lungs: diminished bilateral bases   Heart: S1S2 normal, irregularly irregular; controlled afib on monitor  Chest: symmetrical expansion with inspiration and expirations; no rocking of sternum noted. Right chest wall crepitus   Abdomen: soft, non-tender  Bowel sounds: normoactive   Kidneys: -225-600=7948 ml over 24 hours; Cr 0.6  Wound/Incisions: Right chest wall incision with dressing CDI; pacing wires out 4/7  Extremities: BLE pulses present; trace swelling noted in BLE  Neurological: alert, oriented and grossly non-focal   Chest tubes/Drains: Chest tube # 1 with -726=158 ml serosanguinous drainage in 24 hours; small airleak noted      Assessment:   Post-op: 2 days. Condition: In stable condition. Plan:   1. Cardiovascular: s/p MVr with annuloplasty ring, right sided MAZE, ISAAC clip   ASA, statin, BB  Chronic, persistent atrial fibrillation with gigantic left atrium- rate controlled. On dig, BB, xarelto     2. Pulmonary:   Back on 1L NC this morning, wean as able   Expansion measures- IS, acapella, oobtc, ambulation  Albuterol every 4 hours while awake. Hypertonic nebs. EZPAP     3. Neurology:   Post operative pain- PRN tylenol, oxy. Still with moderate post op pain- trial scheduled gabapentin, increased 4/9 much improved    4. Nephrology:   Weight still up from pre-op  Adequate 24 hour UOP with Cr of 0.6  Diurese as tolerated- PO lasix today  Na 126 from 122, no symptoms    5. Endocrinology:   Glucose stable on Lantus and SSI   No hx of DM. A1C 5.6 on 4/6    6. Hematology:   Acute blood loss anemia- H&H stable  Post operative thrombocytopenia- plts improved at 129    7. Microbiology:   No issues at present     8. Nutrition:   Continue diet as ordered. ONS     9. Labs:   AM labs and imaging reviewed as above   Hyponatremia- na 126 from 122. 1500 ml fluid restriction. Monitor. asymptomatic    10. Post-op Drains/Wires: Keeping chest tube for today. Remove arterial line and CVC. Left IJ CVC (4/6)   Left brachial caesar (4/6)     11. D/C Goals: CM following. Will await PT/OT recs.  Pt lives at home alone PTA and does not have any family members that would be able to stay with her following surgery. 12. Continue post-op care of patient in the ICU. Improving. Sodium 126 from 122.  Anticipate home tomorrow    GI prophylaxis: pepcid   DVT prophylaxis: xarelto   ________________________________________________________________________  Irasema Cameron MD  4/10/2022  8:02 AM

## 2022-04-11 PROBLEM — J90 PLEURAL EFFUSION, BILATERAL: Status: RESOLVED | Noted: 2022-03-30 | Resolved: 2022-04-11

## 2022-04-11 PROBLEM — I50.9 DECOMPENSATED HEART FAILURE (HCC): Status: RESOLVED | Noted: 2022-03-30 | Resolved: 2022-04-11

## 2022-04-11 LAB
ANION GAP SERPL CALCULATED.3IONS-SCNC: 6 MMOL/L (ref 3–16)
ANION GAP SERPL CALCULATED.3IONS-SCNC: 6 MMOL/L (ref 3–16)
BUN BLDV-MCNC: 17 MG/DL (ref 7–20)
BUN BLDV-MCNC: 21 MG/DL (ref 7–20)
CALCIUM SERPL-MCNC: 8.4 MG/DL (ref 8.3–10.6)
CALCIUM SERPL-MCNC: 9 MG/DL (ref 8.3–10.6)
CHLORIDE BLD-SCNC: 83 MMOL/L (ref 99–110)
CHLORIDE BLD-SCNC: 84 MMOL/L (ref 99–110)
CHLORIDE URINE RANDOM: 49 MMOL/L
CO2: 33 MMOL/L (ref 21–32)
CO2: 34 MMOL/L (ref 21–32)
CREAT SERPL-MCNC: 0.6 MG/DL (ref 0.6–1.2)
CREAT SERPL-MCNC: 0.7 MG/DL (ref 0.6–1.2)
GFR AFRICAN AMERICAN: >60
GFR AFRICAN AMERICAN: >60
GFR NON-AFRICAN AMERICAN: >60
GFR NON-AFRICAN AMERICAN: >60
GLUCOSE BLD-MCNC: 104 MG/DL (ref 70–99)
GLUCOSE BLD-MCNC: 113 MG/DL (ref 70–99)
GLUCOSE BLD-MCNC: 136 MG/DL (ref 70–99)
GLUCOSE BLD-MCNC: 83 MG/DL (ref 70–99)
GLUCOSE BLD-MCNC: 84 MG/DL (ref 70–99)
HCT VFR BLD CALC: 35 % (ref 36–48)
HEMOGLOBIN: 11.8 G/DL (ref 12–16)
MAGNESIUM: 2.1 MG/DL (ref 1.8–2.4)
MCH RBC QN AUTO: 31.7 PG (ref 26–34)
MCHC RBC AUTO-ENTMCNC: 33.8 G/DL (ref 31–36)
MCV RBC AUTO: 93.8 FL (ref 80–100)
PDW BLD-RTO: 13.1 % (ref 12.4–15.4)
PERFORMED ON: ABNORMAL
PERFORMED ON: ABNORMAL
PERFORMED ON: NORMAL
PLATELET # BLD: 229 K/UL (ref 135–450)
PMV BLD AUTO: 7.1 FL (ref 5–10.5)
POTASSIUM SERPL-SCNC: 5.2 MMOL/L (ref 3.5–5.1)
POTASSIUM SERPL-SCNC: 5.2 MMOL/L (ref 3.5–5.1)
POTASSIUM, UR: 32.3 MMOL/L
RBC # BLD: 3.73 M/UL (ref 4–5.2)
SODIUM BLD-SCNC: 122 MMOL/L (ref 136–145)
SODIUM BLD-SCNC: 124 MMOL/L (ref 136–145)
SODIUM URINE: 74 MMOL/L
T4 FREE: 1.1 NG/DL (ref 0.9–1.8)
TSH REFLEX: 8.87 UIU/ML (ref 0.27–4.2)
WBC # BLD: 7.3 K/UL (ref 4–11)

## 2022-04-11 PROCEDURE — 94669 MECHANICAL CHEST WALL OSCILL: CPT

## 2022-04-11 PROCEDURE — 2580000003 HC RX 258: Performed by: THORACIC SURGERY (CARDIOTHORACIC VASCULAR SURGERY)

## 2022-04-11 PROCEDURE — 2580000003 HC RX 258: Performed by: INTERNAL MEDICINE

## 2022-04-11 PROCEDURE — 6370000000 HC RX 637 (ALT 250 FOR IP): Performed by: STUDENT IN AN ORGANIZED HEALTH CARE EDUCATION/TRAINING PROGRAM

## 2022-04-11 PROCEDURE — 82436 ASSAY OF URINE CHLORIDE: CPT

## 2022-04-11 PROCEDURE — 2060000000 HC ICU INTERMEDIATE R&B

## 2022-04-11 PROCEDURE — 80048 BASIC METABOLIC PNL TOTAL CA: CPT

## 2022-04-11 PROCEDURE — 6370000000 HC RX 637 (ALT 250 FOR IP): Performed by: NURSE PRACTITIONER

## 2022-04-11 PROCEDURE — 2700000000 HC OXYGEN THERAPY PER DAY

## 2022-04-11 PROCEDURE — 36415 COLL VENOUS BLD VENIPUNCTURE: CPT

## 2022-04-11 PROCEDURE — 2580000003 HC RX 258: Performed by: NURSE PRACTITIONER

## 2022-04-11 PROCEDURE — 97110 THERAPEUTIC EXERCISES: CPT

## 2022-04-11 PROCEDURE — 99024 POSTOP FOLLOW-UP VISIT: CPT | Performed by: NURSE PRACTITIONER

## 2022-04-11 PROCEDURE — 84300 ASSAY OF URINE SODIUM: CPT

## 2022-04-11 PROCEDURE — 6370000000 HC RX 637 (ALT 250 FOR IP): Performed by: THORACIC SURGERY (CARDIOTHORACIC VASCULAR SURGERY)

## 2022-04-11 PROCEDURE — 94640 AIRWAY INHALATION TREATMENT: CPT

## 2022-04-11 PROCEDURE — 97535 SELF CARE MNGMENT TRAINING: CPT

## 2022-04-11 PROCEDURE — 84133 ASSAY OF URINE POTASSIUM: CPT

## 2022-04-11 PROCEDURE — 94761 N-INVAS EAR/PLS OXIMETRY MLT: CPT

## 2022-04-11 PROCEDURE — 84443 ASSAY THYROID STIM HORMONE: CPT

## 2022-04-11 PROCEDURE — 83735 ASSAY OF MAGNESIUM: CPT

## 2022-04-11 PROCEDURE — 84439 ASSAY OF FREE THYROXINE: CPT

## 2022-04-11 PROCEDURE — 83935 ASSAY OF URINE OSMOLALITY: CPT

## 2022-04-11 PROCEDURE — 85027 COMPLETE CBC AUTOMATED: CPT

## 2022-04-11 PROCEDURE — 6360000002 HC RX W HCPCS: Performed by: THORACIC SURGERY (CARDIOTHORACIC VASCULAR SURGERY)

## 2022-04-11 RX ORDER — SPIRONOLACTONE 25 MG/1
25 TABLET ORAL DAILY
Qty: 7 TABLET | Refills: 0 | OUTPATIENT
Start: 2022-04-11 | End: 2022-04-18

## 2022-04-11 RX ORDER — FAMOTIDINE 20 MG/1
20 TABLET, FILM COATED ORAL DAILY
Qty: 30 TABLET | Refills: 0 | OUTPATIENT
Start: 2022-04-11 | End: 2022-05-11

## 2022-04-11 RX ORDER — FUROSEMIDE 40 MG/1
40 TABLET ORAL DAILY
Qty: 7 TABLET | Refills: 0 | OUTPATIENT
Start: 2022-04-12 | End: 2022-04-19

## 2022-04-11 RX ORDER — ASPIRIN 81 MG/1
81 TABLET ORAL DAILY
Qty: 30 TABLET | Refills: 0 | OUTPATIENT
Start: 2022-04-12 | End: 2022-05-12

## 2022-04-11 RX ORDER — SPIRONOLACTONE 25 MG/1
25 TABLET ORAL DAILY
Status: DISCONTINUED | OUTPATIENT
Start: 2022-04-11 | End: 2022-04-11

## 2022-04-11 RX ORDER — FUROSEMIDE 40 MG/1
40 TABLET ORAL DAILY
Status: DISCONTINUED | OUTPATIENT
Start: 2022-04-12 | End: 2022-04-11

## 2022-04-11 RX ORDER — SERTRALINE HYDROCHLORIDE 25 MG/1
25 TABLET, FILM COATED ORAL DAILY
Qty: 30 TABLET | Refills: 0 | OUTPATIENT
Start: 2022-04-12 | End: 2022-05-12

## 2022-04-11 RX ORDER — TRAMADOL HYDROCHLORIDE 50 MG/1
50 TABLET ORAL EVERY 6 HOURS PRN
Qty: 28 TABLET | Refills: 0 | OUTPATIENT
Start: 2022-04-11 | End: 2022-04-18

## 2022-04-11 RX ORDER — METHOCARBAMOL 500 MG/1
500 TABLET, FILM COATED ORAL 3 TIMES DAILY PRN
Qty: 21 TABLET | Refills: 0 | OUTPATIENT
Start: 2022-04-11 | End: 2022-04-18

## 2022-04-11 RX ORDER — ATORVASTATIN CALCIUM 40 MG/1
40 TABLET, FILM COATED ORAL NIGHTLY
Qty: 30 TABLET | Refills: 0 | OUTPATIENT
Start: 2022-04-11 | End: 2022-05-11

## 2022-04-11 RX ORDER — DIGOXIN 125 MCG
125 TABLET ORAL DAILY
Qty: 30 TABLET | Refills: 0 | OUTPATIENT
Start: 2022-04-12 | End: 2022-05-12

## 2022-04-11 RX ADMIN — Medication 400 MG: at 09:16

## 2022-04-11 RX ADMIN — GABAPENTIN 200 MG: 100 CAPSULE ORAL at 14:39

## 2022-04-11 RX ADMIN — ATORVASTATIN CALCIUM 40 MG: 40 TABLET, FILM COATED ORAL at 20:12

## 2022-04-11 RX ADMIN — ALBUTEROL SULFATE 2.5 MG: 2.5 SOLUTION RESPIRATORY (INHALATION) at 20:22

## 2022-04-11 RX ADMIN — DIGOXIN 125 MCG: 125 TABLET ORAL at 09:15

## 2022-04-11 RX ADMIN — SPIRONOLACTONE 25 MG: 25 TABLET ORAL at 09:15

## 2022-04-11 RX ADMIN — Medication 15 ML: at 09:16

## 2022-04-11 RX ADMIN — GABAPENTIN 200 MG: 100 CAPSULE ORAL at 09:15

## 2022-04-11 RX ADMIN — SODIUM CHLORIDE: 234 INJECTION INTRAMUSCULAR; INTRAVENOUS; SUBCUTANEOUS at 17:24

## 2022-04-11 RX ADMIN — TRAMADOL HYDROCHLORIDE 100 MG: 50 TABLET, COATED ORAL at 02:28

## 2022-04-11 RX ADMIN — SODIUM CHLORIDE SOLN NEBU 3% 4 ML: 3 NEBU SOLN at 08:32

## 2022-04-11 RX ADMIN — ALBUTEROL SULFATE 2.5 MG: 2.5 SOLUTION RESPIRATORY (INHALATION) at 15:48

## 2022-04-11 RX ADMIN — RIVAROXABAN 20 MG: 20 TABLET, FILM COATED ORAL at 17:20

## 2022-04-11 RX ADMIN — TRAMADOL HYDROCHLORIDE 100 MG: 50 TABLET, COATED ORAL at 14:43

## 2022-04-11 RX ADMIN — ALBUTEROL SULFATE 2.5 MG: 2.5 SOLUTION RESPIRATORY (INHALATION) at 08:31

## 2022-04-11 RX ADMIN — Medication 400 MG: at 20:12

## 2022-04-11 RX ADMIN — ASPIRIN 81 MG: 81 TABLET, COATED ORAL at 09:15

## 2022-04-11 RX ADMIN — FAMOTIDINE 20 MG: 20 TABLET ORAL at 20:12

## 2022-04-11 RX ADMIN — Medication 15 ML: at 20:12

## 2022-04-11 RX ADMIN — METOPROLOL TARTRATE 25 MG: 25 TABLET, FILM COATED ORAL at 20:11

## 2022-04-11 RX ADMIN — MUPIROCIN: 20 OINTMENT TOPICAL at 09:17

## 2022-04-11 RX ADMIN — TRAMADOL HYDROCHLORIDE 100 MG: 50 TABLET, COATED ORAL at 22:00

## 2022-04-11 RX ADMIN — GABAPENTIN 200 MG: 100 CAPSULE ORAL at 20:11

## 2022-04-11 RX ADMIN — FAMOTIDINE 20 MG: 20 TABLET ORAL at 09:15

## 2022-04-11 RX ADMIN — SODIUM CHLORIDE, PRESERVATIVE FREE 10 ML: 5 INJECTION INTRAVENOUS at 09:16

## 2022-04-11 RX ADMIN — SERTRALINE 25 MG: 50 TABLET, FILM COATED ORAL at 09:16

## 2022-04-11 ASSESSMENT — PAIN SCALES - GENERAL
PAINLEVEL_OUTOF10: 0
PAINLEVEL_OUTOF10: 4
PAINLEVEL_OUTOF10: 0
PAINLEVEL_OUTOF10: 0
PAINLEVEL_OUTOF10: 4
PAINLEVEL_OUTOF10: 7
PAINLEVEL_OUTOF10: 0
PAINLEVEL_OUTOF10: 5
PAINLEVEL_OUTOF10: 8
PAINLEVEL_OUTOF10: 6
PAINLEVEL_OUTOF10: 7

## 2022-04-11 ASSESSMENT — PAIN DESCRIPTION - PAIN TYPE: TYPE: SURGICAL PAIN

## 2022-04-11 ASSESSMENT — PAIN DESCRIPTION - PROGRESSION
CLINICAL_PROGRESSION: NOT CHANGED

## 2022-04-11 ASSESSMENT — PAIN DESCRIPTION - LOCATION: LOCATION: INCISION

## 2022-04-11 NOTE — PROGRESS NOTES
CVTS Cardiothoracic Progress Note:                                CC:  Post op follow up     Surgery: 4/6 MINIMAL ACCESS MITRAL VALVE REPAIR USING HUTCHINSON 38MM PHYSIO II ANNULOPLASTY RING, RIGHT SIDED MAZE, LEFT ATERIAL APPENDAGE CLIP PLACEMENT, CRYO NERVE ABLATION, ON PUMP, WITH BILATERAL PECTORALIS BLOCKS, XAVIER, AND TEMPORARY PACING [facility] course:  4/7 Hemodynamically stable overnight. On low dose levo for BP support. 4/8 No acute events overnight. BPs soft but adequate. Remains in atrial fibrillation. 4/9 doing well  4/10 much improved  4/11 No acute events over the weekend. Hemodynamically stable, rate controlled afib.        Past Medical History:   Diagnosis Date    Varicose vein         Past Surgical History:   Procedure Laterality Date    MITRAL VALVE REPAIR N/A 4/6/2022    MINIMAL ACCESS MITRAL VALVE REPAIR USING HUTCHINSON 38MM PHYSIO II ANNULOPLASTY RING, RIGHT SIDED MAZE, LEFT ATERIAL APPENDAGE CLIP PLACEMENT, CRYO NERVE ABLATION, ON PUMP, WITH BILATERAL PECTORALIS BLOCKS, XAVIER, AND TEMPORARY PACING WIRE PLACEMENT performed by Carlita Srinivasan MD at [facility] as of 03/30/2022 - Fully Reviewed 03/30/2022   Allergen Reaction Noted    Dairycare [lactase-lactobacillus]  03/30/2022    Eggs or egg-derived products  03/30/2022        Patient Active Problem List   Diagnosis    Atrial fibrillation with RVR ([address]    New onset atrial fibrillation (HCC)    LAFB (left anterior fascicular block)    Cardiomegaly    Pansystolic murmur        Vital Signs: /62   Pulse 86   Temp 97.7 °F (36.5 °C)   Resp 19   Ht 5' (1.524 m)   Wt 102 lb 9.6 oz (46.5 kg)   SpO2 94%   BMI 20.04 kg/m²  O2 Flow Rate (L/min): 2 L/min     Admission Weight: Weight: 107 lb (48.5 kg)    Weight on 4/6 (43.6 kg) Pre-op   Weight on 4/7 (46.2 kg) +2.6 kg   4/8 47.3 kg +3.7 kg   4/9 48.4 kg   4/10 48.8 kg   4/11 46.5 kg +2.9 kg     Intake/Output:     Intake/Output Summary (Last 24 hours) at 4/11/2022 1404  Last data filed at 4/11/2022 1216  Gross per 24 hour   Intake 840 ml   Output 2650 ml   Net -1810 ml        LABORATORY DATA:     CBC:   Recent Labs     04/09/22  0415 04/10/22  0410 04/11/22  0414   WBC 10.4 9.6 7.3   HGB 11.8* 11.8* 11.8*   HCT 34.5* 34.9* 35.0*   MCV 95.0 93.1 93.8    197 229     BMP:   Recent Labs     04/09/22  0415 04/10/22  0410 04/11/22  0414   * 126* 122*   K 4.9 4.2 5.2*   CL 87* 85* 83*   CO2 29 33* 33*   BUN 29* 25* 21*   CREATININE 0.6 0.8 0.7     MG:    Recent Labs     04/09/22  0415 04/10/22  0410 04/11/22  0414   MG 2.00 2.10 2.10      CXR: 4/9/22  FINDINGS:   Cardiac and mediastinal silhouettes appear similar.  Soft tissue emphysema   overlying the right chest wall.  There is a trace right apical pneumothorax   measuring just under 9 mm from pleural to pleural surface.  The right-sided   chest tube has been removed.  Osseous structures appear similar.  Bibasilar   atelectasis.  Small left effusion.  Similar osseous structures.  Rib   fractures are noted.           Impression   Interval right chest tube removal, with a trace right apical pneumothorax   measuring just under 9 mm from pleural to pleural surface.       Bibasilar airspace disease and small left effusion.       Soft tissue emphysema seen over the right neck base and chest wall.     ________________________________________________________________________    Subjective:   Dietary Intake: slowly improving   no Nausea   Pain Control: adequate    Complaints: none   Bowels: + BM 4/8    Objective:   General appearance: sitting up in chair, in nad   Lungs: diminished bilateral bases   Heart: S1S2 normal, irregularly irregular; controlled afib on monitor  Chest: symmetrical expansion with inspiration and expirations; Right chest wall crepitus   Abdomen: soft, non-tender  Bowel sounds: normoactive   Kidneys: UOP 3000 ml over 24 hours; Cr 0.7  Wound/Incisions: Right chest wall incision CDI; pacing wires out 4/7  Extremities: BLE pulses present; trace swelling noted in BLE  Neurological: alert, oriented and grossly non-focal   Chest tubes/Drains: all out     Assessment:   Post-op: 5 days. Condition: In stable condition. Plan:   1. Cardiovascular: s/p MVr with annuloplasty ring, right sided MAZE, ISAAC clip   ASA, statin, BB  Chronic, persistent atrial fibrillation with gigantic left atrium- rate controlled. On dig, BB, xarelto     2. Pulmonary:   Requiring 2L of oxygen with ambulation, will need home O2 at discharge   Continue expansion measures- IS, acapella, oobtc, ambulation  Albuterol every 4 hours while awake. EZPAP     3. Neurology:   Post operative pain- PRN tylenol, oxy. Scheduled gabapentin     4. Nephrology:   Weight still up from pre-op, but trending down   Adequate 24 hour UOP with Cr of 0.7   Diurese as tolerated- Lasix 40 mg PO daily     5. Endocrinology:   Glucose stable, not requiring SSI coverage, will d/c   No hx of DM. A1C 5.6 on 4/6    6. Hematology:   Acute blood loss anemia- H&H stable  Post operative thrombocytopenia- resolved     7. Microbiology:   No issues at present     8. Nutrition:   Continue diet as ordered. ONS     9. Labs:   AM labs and imaging reviewed as above   Hyponatremia- na 122. 1500 ml fluid restriction. Will consult Nephrology     10. Post-op Drains/Wires: all out     11. D/C Goals: CM following. Planning to discharge home with Nephew. Will need HHC, home PT/OT, home oxygen. Likely home in the next day or two pending improvement in sodium     12.  Continue post-op care of patient     GI prophylaxis: pepcid   DVT prophylaxis: xarelto   ________________________________________________________________________  CHARLES Roque - CNP  4/11/2022  2:04 PM

## 2022-04-11 NOTE — PROGRESS NOTES
Call placed to dietician regarding best choice for pt for high protein supplement. Pt already on a high calorie, high protein supplement, but does not care for it. Dietician states that she will try to order something else for the pt try.

## 2022-04-11 NOTE — PROGRESS NOTES
Occupational Therapy  Facility/Department: [facility] C4 PCU  Daily Treatment Note  NAME: Felipa Holden  : 1955  MRN: [id number]    Date of Service: 2022    Discharge Recommendations:  24 hour supervision or assist,Home with Home health OT  OT Equipment Recommendations  Equipment Needed: Yes  Mobility Devices: ADL Assistive Devices  ADL Assistive Devices: Shower Chair with back    Assessment   Performance deficits / Impairments: Decreased functional mobility ; Decreased balance;Decreased ADL status; Decreased endurance;Decreased high-level IADLs;Decreased safe awareness;Decreased strength  Assessment: Pt progressing well with OT goals. Pt progressing with standing balance/tolerance and safety awareness. At baseline pt is independent and  lives alone, but plans to stay with nephew upon discharge who can provide 24/7 supervision/assist. SpO2 remains >95% on 2L O2 during session. Pt completes simple transfers and ADLs with supervision-Mod I this date. Pt ambulates 200 feet supervision and managing O2 tank/cord. Pt remains below baseline level of occupational performance. Recommend continued OT services to increase safety and independence with ADLs and and functional transfers. Anticipate pt will require initial 24/7 supervision/assist and HHOT upon discharge. Prognosis: Good    OT Education: OT Role;Plan of Care;Energy Conservation; ADL Adaptive Strategies;Transfer Training;Home Exercise Program  Patient Education: home safety concerns, OT discharge recommendations, bed mobility, safety with transfers, importance of OOB activity, compensatory LB dressing, O2 cord/tank management during ambulation. Barriers to Learning: none perceived  REQUIRES OT FOLLOW UP: Yes    Activity Tolerance  Activity Tolerance: Patient Tolerated treatment well  Activity Tolerance: SpO2 remains >95% on 2L O2.  Vitals supine: 118/51, 79 bpm  Safety Devices  Safety Devices in place: Yes  Type of devices: Call light within reach;Nurse notified;Gait belt;Left in bed         Patient Diagnosis(es): The primary encounter diagnosis was Atrial fibrillation with RVR ([address] A diagnosis of Acute post-operative pain was also pertinent to this visit. has a past medical history of Varicose vein. has a past surgical history that includes Tonsillectomy and Mitral valve repair (N/A, 4/6/2022). Restrictions  Restrictions/Precautions  Restrictions/Precautions: Fall Risk,General Precautions  Position Activity Restriction  Other position/activity restrictions: O2, progressive ambulation, Sternal precuations in chart, per RN no sternal precautions d/t minimally invasive sx     Subjective   General  Chart Reviewed: Yes  Patient assessed for rehabilitation services?: Yes  Response to previous treatment: Patient with no complaints from previous session  Family / Caregiver Present: No    Diagnosis: Afib with RVR, chest pain, pulmonary edema s/p minimally invasive MVR, partial R MAZE, ISAAC clip on 4/6 by Dr. Abena Santillan    Subjective  Subjective: Pt supine in bed upon OT arrival. Pt reports breathing is improving and she hopes to discharge soon. General Comment  Comments: RN agreeable to OT session. Pre Treatment Pain Screening  Intervention List: Patient able to continue with treatment;Nurse/Physician notified  Vital Signs  Patient Currently in Pain: Denies     Objective    ADL  Grooming: Supervision (standing at sink to wash hands)  LE Dressing: Setup (don socks seated)           Balance  Sitting Balance: Independent  Standing Balance: Supervision    Standing Balance  Time: 3 minutes x1, 5 minutes x1  Activity: ambulation 200 feet in hallway, standing at sink for grooming. Standing balance exercises with UE support on SW. Pt completes 1x10 reps of each exercises. Exercises included: high marches, hip extension, hip abduction, mini squats  Comment: Supervision without AD for ambulation. Pt manages own O2 using wheeled tank carrier.  Supervision with UE support on SW during dynamic BLE standing exercises. Functional Mobility  Functional - Mobility Device: No device  Activity: To/from bathroom; Other  Assist Level: Supervision  Functional Mobility Comments: 200 feet supervision without AD. Pt manages O2 tank carrier with wheels. Bed mobility  Supine to Sit: Modified independent  Sit to Supine: Modified independent  Comment: bedrail, HOB 30 degrees     Transfers  Stand Step Transfers: Supervision  Sit to stand: Independent  Stand to sit:  Independent        Cognition  Overall Cognitive Status: WFL                 Plan   Plan  Times per week: 4-6x/week  Current Treatment Recommendations: Pain Management,Positioning,Gait Training,Safety Education & Training,Balance Training,Patient/Caregiver Education & Training,Self-Care / ADL,Functional Mobility Training,Equipment Evaluation, Education, & procurement,Home Management Training,Endurance Training,Strengthening    AM-PAC Score        AM-[facility] Inpatient Daily Activity Raw Score: 19 (04/11/22 1545)  AM-PAC Inpatient ADL T-Scale Score : 40.22 (04/11/22 1545)  ADL Inpatient CMS 0-100% Score: 42.8 (04/11/22 1545)  ADL Inpatient CMS G-Code Modifier : CK (04/11/22 1545)    Goals  Short term goals  Time Frame for Short term goals: 2 weeks (4/21/22)  Short term goal 1: Mod I functional transfers to/from EOB, chair, toilet-- ongoing 4/11/22, Independent to supervision  Short term goal 2: Mod I toileting-- ongoing 4/11/22  Short term goal 3: Mod I LB dressing-- ongoing 4/11/22, supervision with set up  Short term goal 4: Mod I grooming standing at sink-- ongoing 4/11/22, supervision  Short term goal 5: Mod I UB dressing.-- ongoing 4/11/22  Patient Goals   Patient goals : to be able to return home at discharge       Therapy Time   Individual Concurrent Group Co-treatment   Time In 1456         Time Out 1523         Minutes 27         Timed Code Treatment Minutes: 27 Minutes     If pt is discharged prior to next OT session, this note will serve as the discharge summary.     Jose F Ozuna OT

## 2022-04-11 NOTE — PROGRESS NOTES
Shift: 8908-8997    Nursing assessment at handoff  stable    Rhythm changes Atrial Fibrillation/ controlled    Rhythm Intervention: n/a    Drop in Urinary Output no , 2050 mL output  Changes to drips? none  POD 1 ONLY: Pacing Wires Removed: []Yes           [] NO        [] Platelets < 14,082        [] Arrhythmia        [] Bradycardia        [x] Valve Replacement         [] Pacing for Cardiac Index    LDA Insertion Date Date Changed (if needed) Discontinued Date   Art line   4/8/22   Central Line   4/8/22   ET Tube      Panchal   4/8/22   Chest Tube   4/8/22   Leg Drain      Pacing Wires        Surgery, return to OR no     Hospital Course:  POD# 1    POD #2    CT, A-line, IJ, Panchal, removed without complications    POD #2 - Night Shift    POD #3 - Night Shift -   Controlled A-Fib. Vital signs stable. Slightly hypotensive while asleep. Output from site where chest tube was removed has decreased. Dressing from day shift remained dry and intact throughout duration of night shift. Urine output = 1500 mL. Pain well controlled with tramadol overnight. No other significant changes. POD #4 - day shift- pt stable Afib rate controlled. Ambulating to BR well. Minimal pain controlled. Urine output- 950mL. Chest tube Dressing dry and intact, changed 1800. VSS on RA. POD #4 Night- pt stable Afib rate controlled. Ambulating to bathroom well. Minimal pain controlled. Urine output- 2050mL. Chest tube Dressing dry and intact, changed 0000. VSS on RA.      Changes in O2 requirements   RA during day, desat to 85% when sleeping   Oxygen Therapy  SpO2: 93 %  Pulse Oximeter Device Mode: Intermittent  Pulse Oximeter Device Location: Finger  O2 Device: Nasal cannula  O2 Flow Rate (L/min): 2 L/min     Most recent vitals BP (!) 88/55   Pulse 76   Temp 98.2 °F (36.8 °C) (Oral)   Resp 20   Ht 5' (1.524 m)   Wt 102 lb 9.6 oz (46.5 kg)   SpO2 93%   BMI 20.04 kg/m²       Admission weight Weight: 107 lb (48.5 kg)     Today's weight   Wt Readings from Last 1 Encounters:   04/11/22 102 lb 9.6 oz (46.5 kg)         Lab Data:  CBC:   Recent Labs     04/11/22  0414   WBC 7.3   HGB 11.8*   HCT 35.0*   MCV 93.8        BMP:    Recent Labs     04/11/22  0414   *   K 5.2*   CO2 33*   BUN 21*   CREATININE 0.7     LIVR:   No results for input(s): AST, ALT in the last 72 hours. PT/INR:   No results for input(s): PROT, INR in the last 72 hours. APTT: No results for input(s): APTT in the last 72 hours.     Drip rates at handoff:   none      Electronically signed by Jose Wilson RN  on 4/10/22 at 4:00 PM EDT

## 2022-04-11 NOTE — PROGRESS NOTES
Patient admitted to room 436 from ICU. Transferred to bed as a SBA. Oriented to room, call light, phone, TV, thermostat, bed controls, bathroom, emergency cord, white board, therapy times, unit routine, visiting hours,  and meal times. Patient verbalized understanding. Call light and personal items in reach, alarms active and in place. 4 Eyes Admission Assessment     I agree as the admission nurse that 2 RN's have performed a thorough Head to Toe Skin Assessment on the patient. ALL assessment sites listed below have been assessed on admission. Areas assessed by both nurses: Transfer  [x]   Head, Face, and Ears - L IJ site  [x]   Shoulders, Back, and Chest - R CT Site  [x]   Arms, Elbows, and Hands   [x]   Coccyx, Sacrum, and Ischum  [x]   Legs, Feet, and Heels - R groin incision        Does the Patient have Skin Breakdown?   No         Paras Prevention initiated:  No   Wound Care Orders initiated:  No      [facility] nurse consulted for Pressure Injury (Stage 3,4, Unstageable, DTI, NWPT, and Complex wounds):  No      Nurse 1 eSignature: Electronically signed by Tonya Maxwell RN on 4/11/22 at 5:52 AM EDT    **SHARE this note so that the co-signing nurse is able to place an eSignature**    Nurse 2 eSignature: Electronically signed by Lorenza Guardado RN on 4/11/22 at 5:54 AM EDT

## 2022-04-11 NOTE — CARE COORDINATION
[facility]    Referral received from  to follow for home care services. Patient going home w Nephew in [address] unable to staff timely in this area.  No preference in agency  Referral sent to [facility] [place] home care accepted      km' spouse    [address] [place] [address], [place]  [facility] [phone]

## 2022-04-11 NOTE — PROGRESS NOTES
Gave report to Laurence Wang. Transferred patient to , room 436.     [facility]

## 2022-04-11 NOTE — FLOWSHEET NOTE
04/11/22 1500   Mobility   Activity Ambulate in salazar; Ambulate in room; To Bathroom   Level of Assistance Standby assist, set-up cues, supervision of patient - no hands on   Assistive Device None   Distance Ambulated (ft) 150 ft   Ambulation Response Tolerated fairly well

## 2022-04-11 NOTE — FLOWSHEET NOTE
04/11/22 0908   Assessment   Charting Type Shift assessment   Neurological   Neuro (WDL) WDL   Level of Consciousness Alert (0)   Orientation Level Oriented X4   Cognition Appropriate judgement; Appropriate safety awareness; Appropriate attention/concentration; Appropriate for developmental age; Follows commands   Language Clear   NIHSS Stroke Scale Assessed No   [place] Coma Scale   Eye Opening 4   Best Verbal Response 5   Best Motor Response 6   [place] Coma Scale Score 15   HEENT   HEENT (WDL) WDL   Respiratory   Respiratory (WDL) X   Respiratory Pattern Regular   Respiratory Depth Normal   Respiratory Quality/Effort Unlabored   Chest Assessment Chest expansion symmetrical   Cardiac   Cardiac (WDL) X   Cardiac Regularity Irregular   Heart Sounds S1, S2   Cardiac Rhythm Atrial fib   Rhythm Interpretation   Pulse 76   Cardiac Monitor   Telemetry Monitor On Yes   Telemetry Audible Yes   Telemetry Alarms Set Yes   Telemetry Box Number 90   Telemetry Monitor Alarm Parameters    Pacemaker   Pacemaker No   Gastrointestinal   Abdominal (WDL) WDL   Abdomen Inspection Flat;Soft   Tenderness Soft;Nontender   RUQ Bowel Sounds Active   LUQ Bowel Sounds Active   RLQ Bowel Sounds Active   LLQ Bowel Sounds Active   Passing Flatus Y   Peripheral Vascular   Peripheral Vascular (WDL) WDL   RUE Neurovascular Assessment   Capillary Refill Less than/equal to 3 seconds   Color Appropriate for ethnicity   Temperature Warm   R Radial Pulse +2   LUE Neurovascular Assessment   Capillary Refill Less than/equal to 3 seconds   Color Appropriate for ethnicity   Temperature Warm   L Radial Pulse +2   RLE Neurovascular Assessment   Capillary Refill Less than/equal to 3 seconds   Color Appropriate for ethnicity   Temperature Warm   R Pedal Pulse +2   LLE Neurovascular Assessment   Capillary Refill Less than/equal to 3 seconds   Color Appropriate for ethnicity   Temperature Warm   L Pedal Pulse +1   Puncture Site Assessment 1   Location Femoral - right   Site Assessment JOCELIN   Dressing Applied Other (Comment)  (Dry Dressing)   Skin Color/Condition   Skin Color/Condition (WDL) WDL   Skin Integrity   Skin Integrity (WDL) X   Skin Integrity Other (Comment)  (surgical incision)   Location R chest, R groin   Musculoskeletal   Musculoskeletal (WDL) WDL   Genitourinary   Genitourinary (WDL) WDL   Flank Tenderness No   Suprapubic Tenderness No   Dysuria No   Urine Assessment   Incontinence No   Anus/Rectum   Anus/Rectum (WDL) WDL   Incision 04/06/22 Chest Lateral;Right   Date First Assessed/Time First Assessed: 04/06/22 0859   Primary Wound Type: Surgical Type  Location: Chest  Wound Location Orientation: Lateral;Right   Dressing Status Clean;Dry; Intact   Dressing/Treatment Dry dressing;Gauze dressing/dressing sponge   Psychosocial   Psychosocial (WDL) WDL   Patient Behaviors Calm; Cooperative

## 2022-04-11 NOTE — PROGRESS NOTES
Shift: 1900-700    Nursing assessment at handoff  stable    Rhythm changes Atrial Fibrillation/ controlled    Rhythm Intervention: n/a    Drop in Urinary Output no , 2050 mL output  Changes to drips? none  POD 1 ONLY: Pacing Wires Removed: []Yes           [] NO        [] Platelets < 88,350        [] Arrhythmia        [] Bradycardia        [x] Valve Replacement         [] Pacing for Cardiac Index    LDA Insertion Date Date Changed (if needed) Discontinued Date   Art line   4/8/22   Central Line   4/8/22   ET Tube      Panchal   4/8/22   Chest Tube   4/8/22   Leg Drain      Pacing Wires        Surgery, return to OR no     Hospital Course:  POD# 1    POD #2    CT, A-line, IJ, Panchal, removed without complications    POD #2 - Night Shift    POD #3 - Night Shift -   Controlled A-Fib. Vital signs stable. Slightly hypotensive while asleep. Output from site where chest tube was removed has decreased. Dressing from day shift remained dry and intact throughout duration of night shift. Urine output = 1500 mL. Pain well controlled with tramadol overnight. No other significant changes. POD #4 - day shift- pt stable Afib rate controlled. Ambulating to BR well. Minimal pain controlled. Urine output- 950mL. Chest tube Dressing dry and intact, changed 1800. VSS on RA. POD #4 Night- pt stable Afib rate controlled. Ambulating to bathroom well. Minimal pain controlled. Urine output- 2050mL. Chest tube Dressing dry and intact, changed 0000. VSS on RA.      Changes in O2 requirements   RA during day, desat to 85% when sleeping   Oxygen Therapy  SpO2: 91 %  Pulse Oximeter Device Mode: Intermittent  Pulse Oximeter Device Location: Finger  O2 Device: Nasal cannula  O2 Flow Rate (L/min): 2 L/min     Most recent vitals BP (!) 92/50   Pulse 86   Temp 98.2 °F (36.8 °C) (Oral)   Resp 18   Ht 5' (1.524 m)   Wt 102 lb 9.6 oz (46.5 kg)   SpO2 91%   BMI 20.04 kg/m²       Admission weight Weight: 107 lb (48.5 kg)     Today's weight   Wt Readings from Last 1 Encounters:   04/11/22 102 lb 9.6 oz (46.5 kg)         Lab Data:  CBC:   Recent Labs     04/11/22  0414   WBC 7.3   HGB 11.8*   HCT 35.0*   MCV 93.8        BMP:    Recent Labs     04/11/22  0414   *   K 5.2*   CO2 33*   BUN 21*   CREATININE 0.7     LIVR:   No results for input(s): AST, ALT in the last 72 hours. PT/INR:   No results for input(s): PROT, INR in the last 72 hours. APTT: No results for input(s): APTT in the last 72 hours.     Drip rates at handoff:   none      Electronically signed by Lissy Alexis RN  on 4/10/22 at 4:00 PM EDT

## 2022-04-11 NOTE — PROGRESS NOTES
Pt ambulated approximately 350 ft in hallway with RN. Pt required 2 liters of oxygen during ambulation. Otherwise, pt tolerated well.

## 2022-04-11 NOTE — PLAN OF CARE
Problem: OXYGENATION/RESPIRATORY FUNCTION  Goal: Patient will maintain patent airway  Outcome: Ongoing  Goal: Patient will achieve/maintain normal respiratory rate/effort  Description: Respiratory rate and effort will be within normal limits for the patient  Outcome: Ongoing     Problem: HEMODYNAMIC STATUS  Goal: Patient has stable vital signs and fluid balance  Outcome: Ongoing     Problem: FLUID AND ELECTROLYTE IMBALANCE  Goal: Fluid and electrolyte balance are achieved/maintained  Outcome: Ongoing     Problem: ACTIVITY INTOLERANCE/IMPAIRED MOBILITY  Goal: Mobility/activity is maintained at optimum level for patient  Outcome: Ongoing     Problem: Falls - Risk of:  Goal: Will remain free from falls  Description: Will remain free from falls  Outcome: Ongoing  Goal: Absence of physical injury  Description: Absence of physical injury  Outcome: Ongoing     Problem: Pain:  Goal: Pain level will decrease  Description: Pain level will decrease  Outcome: Ongoing  Goal: Control of acute pain  Description: Control of acute pain  Outcome: Ongoing  Goal: Control of chronic pain  Description: Control of chronic pain  Outcome: Ongoing     Problem: Nutrition  Goal: Optimal nutrition therapy  Outcome: Ongoing     Patient's EF (Ejection Fraction) is greater than 40%    Heart Failure Medications:  Diuretics[de-identified] None    (One of the following REQUIRED for EF </= 40%/SYSTOLIC FAILURE but MAY be used in EF% >40%/DIASTOLIC FAILURE)        ACE[de-identified] None        ARB[de-identified] None         ARNI[de-identified] None    (Beta Blockers)  NON- Evidenced Based Beta Blocker (for EF% >40%/DIASTOLIC FAILURE): Metoprolol TARTrate- Lopressor    Evidenced Based Beta Blocker::(REQUIRED for EF% <40%/SYSTOLIC FAILURE) Metoprolol SUCCinate- Toprol XL  . ..................................................................................................................................................   Patient's weights and intake/output reviewed: Yes    Patient's Last Weight: 102 lbs obtained by standing scale. Difference of 5 lbs less than last documented weight. Intake/Output Summary (Last 24 hours) at 4/11/2022 1742  Last data filed at 4/11/2022 1216  Gross per 24 hour   Intake 600 ml   Output 2650 ml   Net -2050 ml       Comorbidities Reviewed Yes    Patient has a past medical history of Varicose vein. >>For CHF and Comorbidity documentation on Education Time and Topics, please see Education Tab    Progressive Mobility Assessment:  What is this patient's Current Level of Mobility?: Ambulatory- with Assistance  How was this patient Mobilized today?: Edge of Bed, Up to Chair,  Up to Toilet/Shower, Up in Room, and Up in Hallway, ambulated 350 ft                 With Whom? Nurse, PCA, PT, and OT                 Level of Difficulty/Assistance: 1x Assist     Pt up in chair at this time on  2 L O2. Pt denies shortness of breath. Pt without lower extremity edema.      Patient and/or Family's stated Goal of Care this Admission: reduce shortness of breath, increase activity tolerance, and better understand heart failure and disease management prior to discharge        :

## 2022-04-11 NOTE — PROCEDURES
[facility]                               PULMONARY FUNCTION    PATIENT NAME: Levy Lizarraga                         :        1955  MED REC NO:   [id number]                          ROOM:       0045  ACCOUNT NO:   [de-identified]                           ADMIT DATE: 2022  PROVIDER:     Reji Contreras MD    DATE OF PROCEDURE:  2022    This is a spirometry. FEV1 of 0.89, 45% predicted, FVC of 1.26, 50%  predicted, FEV1 to FVC ratio of 69% showing a very severe obstructive  lung defect.         Olaf Malloy MD    D: 2022 8:04:29       T: 2022 11:13:16     [email or web address]  Job#: [id number]     Doc#: [id number]    CC:

## 2022-04-12 LAB
ALBUMIN SERPL-MCNC: 3.2 G/DL (ref 3.4–5)
ALBUMIN SERPL-MCNC: 3.2 G/DL (ref 3.4–5)
ANION GAP SERPL CALCULATED.3IONS-SCNC: 5 MMOL/L (ref 3–16)
ANION GAP SERPL CALCULATED.3IONS-SCNC: 7 MMOL/L (ref 3–16)
ANION GAP SERPL CALCULATED.3IONS-SCNC: 9 MMOL/L (ref 3–16)
BUN BLDV-MCNC: 13 MG/DL (ref 7–20)
BUN BLDV-MCNC: 13 MG/DL (ref 7–20)
BUN BLDV-MCNC: 15 MG/DL (ref 7–20)
CALCIUM IONIZED: 1.11 MMOL/L (ref 1.12–1.32)
CALCIUM SERPL-MCNC: 8.4 MG/DL (ref 8.3–10.6)
CALCIUM SERPL-MCNC: 8.5 MG/DL (ref 8.3–10.6)
CALCIUM SERPL-MCNC: 8.6 MG/DL (ref 8.3–10.6)
CHLORIDE BLD-SCNC: 85 MMOL/L (ref 99–110)
CHLORIDE BLD-SCNC: 87 MMOL/L (ref 99–110)
CHLORIDE BLD-SCNC: 87 MMOL/L (ref 99–110)
CO2: 29 MMOL/L (ref 21–32)
CO2: 30 MMOL/L (ref 21–32)
CO2: 31 MMOL/L (ref 21–32)
CREAT SERPL-MCNC: 1.1 MG/DL (ref 0.6–1.2)
CREAT SERPL-MCNC: <0.5 MG/DL (ref 0.6–1.2)
CREAT SERPL-MCNC: <0.5 MG/DL (ref 0.6–1.2)
GFR AFRICAN AMERICAN: 60
GFR AFRICAN AMERICAN: >60
GFR AFRICAN AMERICAN: >60
GFR NON-AFRICAN AMERICAN: 49
GFR NON-AFRICAN AMERICAN: >60
GFR NON-AFRICAN AMERICAN: >60
GLUCOSE BLD-MCNC: 100 MG/DL (ref 70–99)
GLUCOSE BLD-MCNC: 103 MG/DL (ref 70–99)
GLUCOSE BLD-MCNC: 105 MG/DL (ref 70–99)
GLUCOSE BLD-MCNC: 122 MG/DL (ref 70–99)
GLUCOSE BLD-MCNC: 93 MG/DL (ref 70–99)
GLUCOSE BLD-MCNC: 98 MG/DL (ref 70–99)
HCT VFR BLD CALC: 31.3 % (ref 36–48)
HEMOGLOBIN: 10.8 G/DL (ref 12–16)
MAGNESIUM: 1.8 MG/DL (ref 1.8–2.4)
MCH RBC QN AUTO: 31.5 PG (ref 26–34)
MCHC RBC AUTO-ENTMCNC: 34.4 G/DL (ref 31–36)
MCV RBC AUTO: 91.6 FL (ref 80–100)
OSMOLALITY URINE: 386 MOSM/KG (ref 390–1070)
PDW BLD-RTO: 13 % (ref 12.4–15.4)
PERFORMED ON: ABNORMAL
PERFORMED ON: NORMAL
PERFORMED ON: NORMAL
PH VENOUS: 7.4 (ref 7.35–7.45)
PHOSPHORUS: 2.9 MG/DL (ref 2.5–4.9)
PHOSPHORUS: 3.5 MG/DL (ref 2.5–4.9)
PLATELET # BLD: 263 K/UL (ref 135–450)
PMV BLD AUTO: 7.1 FL (ref 5–10.5)
POTASSIUM SERPL-SCNC: 4.3 MMOL/L (ref 3.5–5.1)
POTASSIUM SERPL-SCNC: 4.4 MMOL/L (ref 3.5–5.1)
POTASSIUM SERPL-SCNC: 4.6 MMOL/L (ref 3.5–5.1)
RBC # BLD: 3.42 M/UL (ref 4–5.2)
SODIUM BLD-SCNC: 121 MMOL/L (ref 136–145)
SODIUM BLD-SCNC: 123 MMOL/L (ref 136–145)
SODIUM BLD-SCNC: 126 MMOL/L (ref 136–145)
WBC # BLD: 7.2 K/UL (ref 4–11)

## 2022-04-12 PROCEDURE — 6370000000 HC RX 637 (ALT 250 FOR IP): Performed by: NURSE PRACTITIONER

## 2022-04-12 PROCEDURE — 6370000000 HC RX 637 (ALT 250 FOR IP): Performed by: STUDENT IN AN ORGANIZED HEALTH CARE EDUCATION/TRAINING PROGRAM

## 2022-04-12 PROCEDURE — 82330 ASSAY OF CALCIUM: CPT

## 2022-04-12 PROCEDURE — 80069 RENAL FUNCTION PANEL: CPT

## 2022-04-12 PROCEDURE — 2580000003 HC RX 258: Performed by: INTERNAL MEDICINE

## 2022-04-12 PROCEDURE — 97530 THERAPEUTIC ACTIVITIES: CPT

## 2022-04-12 PROCEDURE — 2060000000 HC ICU INTERMEDIATE R&B

## 2022-04-12 PROCEDURE — 6370000000 HC RX 637 (ALT 250 FOR IP): Performed by: INTERNAL MEDICINE

## 2022-04-12 PROCEDURE — 36415 COLL VENOUS BLD VENIPUNCTURE: CPT

## 2022-04-12 PROCEDURE — 2580000003 HC RX 258: Performed by: THORACIC SURGERY (CARDIOTHORACIC VASCULAR SURGERY)

## 2022-04-12 PROCEDURE — 6370000000 HC RX 637 (ALT 250 FOR IP): Performed by: THORACIC SURGERY (CARDIOTHORACIC VASCULAR SURGERY)

## 2022-04-12 PROCEDURE — 93308 TTE F-UP OR LMTD: CPT

## 2022-04-12 PROCEDURE — 97535 SELF CARE MNGMENT TRAINING: CPT

## 2022-04-12 PROCEDURE — 85027 COMPLETE CBC AUTOMATED: CPT

## 2022-04-12 PROCEDURE — 94640 AIRWAY INHALATION TREATMENT: CPT

## 2022-04-12 PROCEDURE — 83735 ASSAY OF MAGNESIUM: CPT

## 2022-04-12 PROCEDURE — 2700000000 HC OXYGEN THERAPY PER DAY

## 2022-04-12 PROCEDURE — 94669 MECHANICAL CHEST WALL OSCILL: CPT

## 2022-04-12 PROCEDURE — 82530 CORTISOL FREE: CPT

## 2022-04-12 PROCEDURE — 99024 POSTOP FOLLOW-UP VISIT: CPT | Performed by: NURSE PRACTITIONER

## 2022-04-12 PROCEDURE — 6360000002 HC RX W HCPCS: Performed by: THORACIC SURGERY (CARDIOTHORACIC VASCULAR SURGERY)

## 2022-04-12 RX ORDER — LEVOTHYROXINE SODIUM 0.03 MG/1
25 TABLET ORAL DAILY
Status: DISCONTINUED | OUTPATIENT
Start: 2022-04-12 | End: 2022-04-14 | Stop reason: HOSPADM

## 2022-04-12 RX ORDER — GABAPENTIN 100 MG/1
100 CAPSULE ORAL 3 TIMES DAILY
Status: DISCONTINUED | OUTPATIENT
Start: 2022-04-12 | End: 2022-04-14 | Stop reason: HOSPADM

## 2022-04-12 RX ADMIN — LEVOTHYROXINE SODIUM 25 MCG: 0.03 TABLET ORAL at 11:02

## 2022-04-12 RX ADMIN — SERTRALINE 25 MG: 50 TABLET, FILM COATED ORAL at 08:49

## 2022-04-12 RX ADMIN — Medication 400 MG: at 21:32

## 2022-04-12 RX ADMIN — FAMOTIDINE 20 MG: 20 TABLET ORAL at 08:49

## 2022-04-12 RX ADMIN — SODIUM CHLORIDE: 234 INJECTION INTRAMUSCULAR; INTRAVENOUS; SUBCUTANEOUS at 13:29

## 2022-04-12 RX ADMIN — SODIUM CHLORIDE: 234 INJECTION INTRAMUSCULAR; INTRAVENOUS; SUBCUTANEOUS at 19:37

## 2022-04-12 RX ADMIN — RIVAROXABAN 20 MG: 20 TABLET, FILM COATED ORAL at 17:26

## 2022-04-12 RX ADMIN — DIGOXIN 125 MCG: 125 TABLET ORAL at 08:49

## 2022-04-12 RX ADMIN — GABAPENTIN 200 MG: 100 CAPSULE ORAL at 08:49

## 2022-04-12 RX ADMIN — Medication 15 ML: at 08:50

## 2022-04-12 RX ADMIN — GABAPENTIN 100 MG: 100 CAPSULE ORAL at 13:32

## 2022-04-12 RX ADMIN — Medication 400 MG: at 08:49

## 2022-04-12 RX ADMIN — GABAPENTIN 100 MG: 100 CAPSULE ORAL at 21:32

## 2022-04-12 RX ADMIN — ALBUTEROL SULFATE 2.5 MG: 2.5 SOLUTION RESPIRATORY (INHALATION) at 08:28

## 2022-04-12 RX ADMIN — ALBUTEROL SULFATE 2.5 MG: 2.5 SOLUTION RESPIRATORY (INHALATION) at 15:38

## 2022-04-12 RX ADMIN — ATORVASTATIN CALCIUM 40 MG: 40 TABLET, FILM COATED ORAL at 21:32

## 2022-04-12 RX ADMIN — ACETAMINOPHEN 650 MG: 325 TABLET ORAL at 08:49

## 2022-04-12 RX ADMIN — ALBUTEROL SULFATE 2.5 MG: 2.5 SOLUTION RESPIRATORY (INHALATION) at 12:10

## 2022-04-12 RX ADMIN — FAMOTIDINE 20 MG: 20 TABLET ORAL at 21:32

## 2022-04-12 RX ADMIN — ALBUTEROL SULFATE 2.5 MG: 2.5 SOLUTION RESPIRATORY (INHALATION) at 20:01

## 2022-04-12 RX ADMIN — SODIUM CHLORIDE: 234 INJECTION INTRAMUSCULAR; INTRAVENOUS; SUBCUTANEOUS at 03:21

## 2022-04-12 RX ADMIN — METOPROLOL TARTRATE 25 MG: 25 TABLET, FILM COATED ORAL at 21:33

## 2022-04-12 RX ADMIN — Medication 15 ML: at 21:34

## 2022-04-12 RX ADMIN — ASPIRIN 81 MG: 81 TABLET, COATED ORAL at 08:49

## 2022-04-12 RX ADMIN — SODIUM CHLORIDE, PRESERVATIVE FREE 10 ML: 5 INJECTION INTRAVENOUS at 21:34

## 2022-04-12 RX ADMIN — TRAMADOL HYDROCHLORIDE 100 MG: 50 TABLET, COATED ORAL at 19:18

## 2022-04-12 RX ADMIN — TRAMADOL HYDROCHLORIDE 100 MG: 50 TABLET, COATED ORAL at 03:55

## 2022-04-12 ASSESSMENT — PAIN DESCRIPTION - LOCATION
LOCATION: RIB CAGE
LOCATION: RIB CAGE
LOCATION: INCISION

## 2022-04-12 ASSESSMENT — PAIN DESCRIPTION - DESCRIPTORS: DESCRIPTORS: SORE

## 2022-04-12 ASSESSMENT — PAIN SCALES - GENERAL
PAINLEVEL_OUTOF10: 5
PAINLEVEL_OUTOF10: 9
PAINLEVEL_OUTOF10: 6
PAINLEVEL_OUTOF10: 8
PAINLEVEL_OUTOF10: 2

## 2022-04-12 ASSESSMENT — PAIN DESCRIPTION - PAIN TYPE
TYPE: SURGICAL PAIN

## 2022-04-12 ASSESSMENT — PAIN DESCRIPTION - FREQUENCY: FREQUENCY: INTERMITTENT

## 2022-04-12 ASSESSMENT — PAIN SCALES - WONG BAKER
WONGBAKER_NUMERICALRESPONSE: 4
WONGBAKER_NUMERICALRESPONSE: 4

## 2022-04-12 ASSESSMENT — PAIN DESCRIPTION - ORIENTATION: ORIENTATION: RIGHT

## 2022-04-12 NOTE — CARE COORDINATION
Spoke with RN who states patient did not end up discharging yesterday due to her sodium level. Per CT NP, will keep patient another day or two in order for labs to improve. Plan is for patient to discharge to nephew's house with home care through [facility]. Will continue to follow.

## 2022-04-12 NOTE — PLAN OF CARE
Problem: OXYGENATION/RESPIRATORY FUNCTION  Goal: Patient will maintain patent airway  4/12/2022 0135 by Diana Hyatt RN  Outcome: Ongoing  4/11/2022 1742 by Jose Wilson RN  Outcome: Ongoing     Problem: HEMODYNAMIC STATUS  Goal: Patient has stable vital signs and fluid balance  4/12/2022 0135 by Diana Hyatt RN  Outcome: Ongoing  4/11/2022 1742 by Jose Wilson RN  Outcome: Ongoing     Problem: FLUID AND ELECTROLYTE IMBALANCE  Goal: Fluid and electrolyte balance are achieved/maintained  4/12/2022 0135 by Diana Hyatt RN  Outcome: Ongoing  4/11/2022 1742 by Jose Wilson RN  Outcome: Ongoing

## 2022-04-12 NOTE — PLAN OF CARE
Problem: Nutrition  Goal: Optimal nutrition therapy  4/12/2022 1324 by Arturo Everett RD, DANIELLE  Outcome: Ongoing  Note: Nutrition Problem #1: Increased nutrient needs  Intervention: Food and/or Nutrient Delivery: Continue Current Diet,Continue Oral Nutrition Supplement  Nutritional Goals: Consume 50% or greater of 3 meals per day and ONS during this admission.

## 2022-04-12 NOTE — PROGRESS NOTES
Occupational Therapy  Facility/Department: [place] [address] [facility]  Daily Treatment Note  NAME: Alex Hernandez  : 1955  MRN: [id number]    Date of Service: 2022    Discharge Recommendations:  24 hour supervision or assist,Home with Home health OT  OT Equipment Recommendations  Equipment Needed: Yes  Mobility Devices: ADL Assistive Devices  ADL Assistive Devices: Shower Chair with back    Assessment   Performance deficits / Impairments: Decreased functional mobility ; Decreased balance;Decreased ADL status; Decreased endurance;Decreased high-level IADLs;Decreased safe awareness;Decreased strength  Assessment: Pt Met all goals this day. Pt demo good safety, balance and coordination for all mobility and grooming/ADL tasks. Pt has no concerns for return to home. Pt safe to d/c home and req no further acute OT  Prognosis: Good  OT Education: OT Role;Plan of Care;Energy Conservation; ADL Adaptive Strategies;Transfer Training;Home Exercise Program  Patient Education: home safety concerns, OT discharge recommendations, bed mobility, safety with transfers, importance of OOB activity, compensatory LB dressing, O2 cord/tank management during ambulation. Barriers to Learning: none perceived  REQUIRES OT FOLLOW UP: Yes  Activity Tolerance  Activity Tolerance: Patient Tolerated treatment well  Activity Tolerance: BP: 123/62, O2: 95%, HR: 75  Safety Devices  Safety Devices in place: Yes  Type of devices: Call light within reach;Nurse notified;Gait belt;Left in bed         Patient Diagnosis(es): The primary encounter diagnosis was Atrial fibrillation with RVR ([facility]). A diagnosis of Acute post-operative pain was also pertinent to this visit. has a past medical history of Varicose vein. has a past surgical history that includes Tonsillectomy and Mitral valve repair (N/A, 2022).     Restrictions  Restrictions/Precautions  Restrictions/Precautions: Fall Risk,General Precautions  Position Activity Restriction  Other position/activity restrictions: O2, progressive ambulation, Sternal precuations in chart, per RN no sternal precautions d/t minimally invasive sx  Subjective   General  Chart Reviewed: Yes  Patient assessed for rehabilitation services?: Yes  Response to previous treatment: Patient with no complaints from previous session  Family / Caregiver Present: No  Diagnosis: Afib with RVR, chest pain, pulmonary edema s/p minimally invasive MVR, partial R MAZE, ISAAC clip on 4/6 by Dr. Morris Reddy  Subjective  Subjective: Pt supine in bed upon OT arrival. Pt reports breathing is improving and has more energy  General Comment  Comments: RN agreeable to OT session. Vital Signs  Patient Currently in Pain: Denies   Orientation  Orientation  Overall Orientation Status: Within Functional Limits  Objective    ADL  Feeding: Independent  Grooming: Independent  LE Dressing: Independent  Toileting: Independent        Balance  Sitting Balance: Independent  Standing Balance: Independent  Standing Balance  Time: 10 minutes  Activity: ambulation 300 ft in hallway (community distance), and grooming/room ambulation  Comment: able to manage IV pole  Functional Mobility  Functional - Mobility Device: No device  Activity: To/from bathroom; Other  Assist Level: Independent  Toilet Transfers  Toilet - Technique: Ambulating  Equipment Used: Standard toilet  Toilet Transfer: Independent  Bed mobility  Supine to Sit: Modified independent  Sit to Supine: Modified independent  Transfers  Sit to stand: Independent  Stand to sit:  Independent                       Cognition  Overall Cognitive Status: WFL  Safety Judgement: Decreased awareness of need for safety                                         Plan   Plan  Times per week: 4-6x/week  Current Treatment Recommendations: Pain Management,Positioning,Gait Training,Safety Education & Training,Balance Training,Patient/Caregiver Education & Training,Self-Care / ADL,Functional Mobility Training,Equipment Evaluation, Education, &

## 2022-04-12 NOTE — PROGRESS NOTES
CVTS Cardiothoracic Progress Note:                                CC:  Post op follow up     Surgery: 4/6 MINIMAL ACCESS MITRAL VALVE REPAIR USING HUTCHINSON 38MM PHYSIO II ANNULOPLASTY RING, RIGHT SIDED MAZE, LEFT ATERIAL APPENDAGE CLIP PLACEMENT, CRYO NERVE ABLATION, ON PUMP, WITH BILATERAL PECTORALIS BLOCKS, XAVIER, AND TEMPORARY PACING [facility] course:  4/7 Hemodynamically stable overnight. On low dose levo for BP support. 4/8 No acute events overnight. BPs soft but adequate. Remains in atrial fibrillation. 4/9 doing well  4/10 much improved  4/11 No acute events over the weekend. Hemodynamically stable, rate controlled afib. 4/12 Na downtrending again yesterday to 122. Otherwise doing very well.        Past Medical History:   Diagnosis Date    Varicose vein         Past Surgical History:   Procedure Laterality Date    MITRAL VALVE REPAIR N/A 4/6/2022    MINIMAL ACCESS MITRAL VALVE REPAIR USING HUTCHINSON 38MM PHYSIO II ANNULOPLASTY RING, RIGHT SIDED MAZE, LEFT ATERIAL APPENDAGE CLIP PLACEMENT, CRYO NERVE ABLATION, ON PUMP, WITH BILATERAL PECTORALIS BLOCKS, XAVIER, AND TEMPORARY PACING WIRE PLACEMENT performed by Delmi Browning MD [facility] as of 03/30/2022 - Fully Reviewed 03/30/2022   Allergen Reaction Noted    Dairycare [lactase-lactobacillus]  03/30/2022    Eggs or egg-derived products  03/30/2022        Patient Active Problem List   Diagnosis    Atrial fibrillation with RVR ([address]    New onset atrial fibrillation (HCC)    LAFB (left anterior fascicular block)    Cardiomegaly    Pansystolic murmur        Vital Signs: /65   Pulse 65   Temp 98.8 °F (37.1 °C) (Oral)   Resp 20   Ht 5' (1.524 m)   Wt 105 lb 8 oz (47.9 kg)   SpO2 96%   BMI 20.60 kg/m²  O2 Flow Rate (L/min): 2 L/min     Admission Weight: Weight: 107 lb (48.5 kg)    Weight on 4/6 (43.6 kg) Pre-op   Weight on 4/7 (46.2 kg) +2.6 kg   4/8 47.3 kg +3.7 kg   4/9 48.4 kg   4/10 48.8 kg   4/11 46.5 kg +2.9 kg   4/12 47.9 kg +4.3 kg     Intake/Output:     Intake/Output Summary (Last 24 hours) at 4/12/2022 1156  Last data filed at 4/12/2022 1112  Gross per 24 hour   Intake 1270 ml   Output 3050 ml   Net -1780 ml        LABORATORY DATA:     CBC:   Recent Labs     04/10/22  0410 04/11/22  0414 04/12/22  0533   WBC 9.6 7.3 7.2   HGB 11.8* 11.8* 10.8*   HCT 34.9* 35.0* 31.3*   MCV 93.1 93.8 91.6    229 263     BMP:   Recent Labs     04/11/22  1444 04/12/22  0534 04/12/22  1027   * 123* 121*   K 5.2* 4.4 4.3   CL 84* 87* 85*   CO2 34* 29 31   PHOS  --   --  2.9   BUN 17 15 13   CREATININE 0.6 <0.5* <0.5*     MG:    Recent Labs     04/10/22  0410 04/11/22  0414 04/12/22  0534   MG 2.10 2.10 1.80      CXR: 4/9/22  FINDINGS:   Cardiac and mediastinal silhouettes appear similar.  Soft tissue emphysema   overlying the right chest wall.  There is a trace right apical pneumothorax   measuring just under 9 mm from pleural to pleural surface.  The right-sided   chest tube has been removed.  Osseous structures appear similar.  Bibasilar   atelectasis.  Small left effusion.  Similar osseous structures.  Rib   fractures are noted.           Impression   Interval right chest tube removal, with a trace right apical pneumothorax   measuring just under 9 mm from pleural to pleural surface.       Bibasilar airspace disease and small left effusion.       Soft tissue emphysema seen over the right neck base and chest wall.     ________________________________________________________________________    Subjective:   Dietary Intake: slowly improving   no Nausea   Pain Control: adequate    Complaints: none   Bowels: + BM 4/8    Objective:   General appearance: sitting up in bed, in nad   Lungs: diminished bilateral bases   Heart: S1S2 normal, irregularly irregular; controlled afib on monitor  Chest: symmetrical expansion with inspiration and expirations; Right chest wall crepitus, improving   Abdomen: soft, non-tender  Bowel sounds: normoactive   Kidneys: UOP 3050 ml over 24 hours; Cr 0.5  Wound/Incisions: Right chest wall incision CDI; pacing wires out 4/7  Extremities: BLE pulses present; trace swelling noted in BLE  Neurological: alert, oriented and grossly non-focal   Chest tubes/Drains: all out     Assessment:   Post-op: 6 days. Condition: In stable condition. Plan:   1. Cardiovascular: s/p MVr with annuloplasty ring, right sided MAZE, ISAAC clip   ASA, statin, BB  Chronic, persistent atrial fibrillation with gigantic left atrium- rate controlled. On dig, BB, xarelto  Repeat echo today      2. Pulmonary:   Requiring 2L of oxygen with ambulation, will need home O2 at discharge   Continue expansion measures- IS, acapella, oobtc, ambulation  Albuterol every 4 hours while awake. EZPAP     3. Neurology:   Post operative pain- PRN tylenol, oxy. Scheduled gabapentin     4. Nephrology:   Weight still up from pre-op  Adequate 24 hour UOP with Cr of 0.5  Hyponatremia- na 121. Nephrology consulted. On 1.5% saline. Will await further recommendations     5. Endocrinology:   Glucose stable  No hx of DM. A1C 5.6 on 4/6    6. Hematology:   Acute blood loss anemia- H&H stable  Post operative thrombocytopenia- resolved     7. Microbiology:   No issues at present     8. Nutrition:   Continue diet as ordered. ONS     9. Labs:   AM labs and imaging reviewed as above     10. Post-op Drains/Wires: all out     11. D/C Goals: CM following. Planning to discharge home with Nephew. Will need HHC, home PT/OT, home oxygen. Likely home in the next day or two pending improvement in sodium     12.  Continue post-op care of patient     GI prophylaxis: pepcid   DVT prophylaxis: xarelto   ________________________________________________________________________  CHARLES Garvey - CNP  4/12/2022  11:56 AM

## 2022-04-12 NOTE — CONSULTS
[facility]  Nephrology Consult Note           Reason for Consult:  Hyponatremia  Requesting Physician:  Dr. Kurtis Pope    Chief Complaint:    Chief Complaint   Patient presents with    Atrial Fibrillation     hx of afib, was at PCP today and told that her heart was going to quickly       History of Present Illness on 4/12/22:    75 y.o. 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 Her appetite is poor and was getting lasix, last dose was on 4/10. TSH is levated  BP has been soft     Past Medical History:        Diagnosis Date    Varicose vein        Past Surgical History:        Procedure Laterality Date    MITRAL VALVE REPAIR N/A 4/6/2022    MINIMAL ACCESS MITRAL VALVE REPAIR USING HUTCHINSON 38MM PHYSIO II ANNULOPLASTY RING, RIGHT SIDED MAZE, LEFT ATERIAL APPENDAGE CLIP PLACEMENT, CRYO NERVE ABLATION, ON PUMP, WITH BILATERAL PECTORALIS BLOCKS, XAVIER, AND TEMPORARY PACING WIRE PLACEMENT performed by Cade York MD at [facility] Medications:    No current facility-administered medications on file prior to encounter. Current Outpatient Medications on File Prior to Encounter   Medication Sig Dispense Refill    aspirin 300 MG suppository Place 300 mg rectally every 6 hours as needed.          Allergies:  Dairycare [lactase-lactobacillus], Eggs or egg-derived products, and Lactose intolerance (gi)    Social History:    Social History     Socioeconomic History    Marital status: Single     Spouse name: Not on file    Number of children: Not on file    Years of education: Not on file    Highest education level: Not on file   Occupational History    Not on file   Tobacco Use    Smoking status: Never Smoker    Smokeless tobacco: Not on file   Substance and Sexual Activity    Alcohol use: No    Drug use: No    Sexual activity: Not on file   Other Topics Concern    Not on file   Social History Narrative    Not on file     Social Determinants of Health     Financial Resource Strain:     Difficulty of Paying Living Expenses: Not on file   Food Insecurity:     Worried About Running Out of Food in the Last Year: Not on file    [place] of Food in the Last Year: Not on file   Transportation Needs:     Lack of Transportation (Medical): Not on file    Lack of Transportation (Non-Medical): Not on file   Physical Activity:     Days of Exercise per Week: Not on file    Minutes of Exercise per Session: Not on file   Stress:     Feeling of Stress : Not on file   Social Connections:     Frequency of Communication with Friends and Family: Not on file    Frequency of Social Gatherings with Friends and Family: Not on file    Attends Latter-day Services: Not on file    Active Member of [address] [place] [address] [email or web address] or Organizations: Not on file    Attends Club or Organization Meetings: Not on file    Marital Status: Not on file   Intimate Partner Violence:     Fear of Current or Ex-Partner: Not on file    Emotionally Abused: Not on file    Physically Abused: Not on file    Sexually Abused: Not on file   Housing Stability:     Unable to Pay for Housing in the Last Year: Not on file    Number of [place] in the Last Year: Not on file    Unstable Housing in the Last Year: Not on file       Family History:   Family History   Problem Relation Age of Onset    Cancer Father     Cancer Paternal Aunt     Cancer Paternal Grandmother        Review of Systems:   Pertinent positives stated above in HPI. All other 10 systems were reviewed and were negative.      Physical exam:   Constitutional:  VITALS:  /62   Pulse 76   Temp 97.6 °F (36.4 °C) (Oral)   Resp 20   Ht 5' (1.524 m)   Wt 105 lb 8 oz (47.9 kg)   SpO2 92%   BMI 20.60 kg/m²   Gen: alert, awake  Neck: No JVD  Skin: Unremarkable  Cardiovascular:  S1, S2 without m/r/g   Respiratory: CTA B without w/r/r; respiratory effort normal  Abdomen:  soft, nt, nd,   Extremities: no lower extremity edema  Neuro/Psy: AAoriented times 3 ; moves all 4 ext    Data/  Recent Labs     04/10/22  0410 04/11/22  0414 04/12/22  0533   WBC 9.6 7.3 7.2   HGB 11.8* 11.8* 10.8*   HCT 34.9* 35.0* 31.3*   MCV 93.1 93.8 91.6    229 263     Recent Labs     04/10/22  0410 04/10/22  0410 04/11/22  0414 04/11/22  0414 04/11/22  1444 04/12/22  0534 04/12/22  1027   *   < > 122*   < > 124* 123* 121*   K 4.2   < > 5.2*   < > 5.2* 4.4 4.3   CL 85*   < > 83*   < > 84* 87* 85*   CO2 33*   < > 33*   < > 34* 29 31   GLUCOSE 94   < > 104*   < > 83 105* 100*   PHOS  --   --   --   --   --   --  2.9   MG 2.10  --  2.10  --   --  1.80  --    BUN 25*   < > 21*   < > 17 15 13   CREATININE 0.8   < > 0.7   < > 0.6 <0.5* <0.5*   LABGLOM >60   < > >60   < > >60 >60 >60   GFRAA >60   < > >60   < > >60 >60 >60    < > = values in this interval not displayed. Urine sodium 74  urine osm 386    Assessment  -Hyponatremia likely decreased osmolar load ( poor appetite) but also hypothyroidism, pain, zoloft. As Bp is soft , will need to rule out adrenal insufficiency   - s/p MV repair on 4/6/22  -A fib    Plan  -follow cortisol level  -change 1.5 % saline at 95 ml/h  -hold zoloft  -decrease gabapentin 100 mg TID ( from 200 mg TID)  -cont FR 1500 ml/day  -encourage good pain control and protein intake  -levothyroxine 25 mcg daily   -serial BMPs  -synthroid 25 mcg daily      Thank you for the consultation. Please do not hesitate to call with questions. Nelda Peabody, MD  Office: [phone]  Fax:    [phone]  [email or web address]

## 2022-04-12 NOTE — PROGRESS NOTES
Shift: 1782-6550    Nursing assessment at handoff  stable    Rhythm changes Atrial Fibrillation/ controlled    Rhythm Intervention: n/a    Drop in Urinary Output no , 2050 mL output  Changes to drips? none  POD 1 ONLY: Pacing Wires Removed: []Yes           [] NO        [] Platelets < 53,206        [] Arrhythmia        [] Bradycardia        [x] Valve Replacement         [] Pacing for Cardiac Index    LDA Insertion Date Date Changed (if needed) Discontinued Date   Art line   4/8/22   Central Line   4/8/22   ET Tube      Panchal   4/8/22   Chest Tube   4/8/22   Leg Drain      Pacing Wires        Surgery, return to OR no     Hospital Course:  POD# 1    POD #2    CT, A-line, IJ, Panchal, removed without complications    POD #2 - Night Shift    POD #3 - Night Shift -   Controlled A-Fib. Vital signs stable. Slightly hypotensive while asleep. Output from site where chest tube was removed has decreased. Dressing from day shift remained dry and intact throughout duration of night shift. Urine output = 1500 mL. Pain well controlled with tramadol overnight. No other significant changes. POD #4 - day shift- pt stable Afib rate controlled. Ambulating to BR well. Minimal pain controlled. Urine output- 950mL. Chest tube Dressing dry and intact, changed 1800. VSS on RA. POD #4 Night- pt stable Afib rate controlled. Ambulating to bathroom well. Minimal pain controlled. Urine output- 2050mL. Chest tube Dressing dry and intact, changed 0000. VSS on RA. POD #5 Night  PT in stable Afib through night and used O2 PRN throughout shift    POD # 6, 7 am to 3 pm  Sodium level remains low, added levothyroxine for elevated TSH, increased rate of Sodium Chloride 50 meq in 500 ml to 95 ml/hr, had echo today, PRN oxygen 2 L with exertion.     Changes in O2 requirements   RA during day, desat to 85% when sleeping   Oxygen Therapy  SpO2: 95 %  Pulse Oximeter Device Mode: Intermittent  Pulse Oximeter Device Location: Finger  O2 Device: None (Room air)  O2 Flow Rate (L/min): 2 L/min     Most recent vitals /62   Pulse 76   Temp 97.6 °F (36.4 °C) (Oral)   Resp 18   Ht 5' (1.524 m)   Wt 105 lb 8 oz (47.9 kg)   SpO2 95%   BMI 20.60 kg/m²       Admission weight Weight: 107 lb (48.5 kg)     Today's weight   Wt Readings from Last 1 Encounters:   04/12/22 105 lb 8 oz (47.9 kg)         Lab Data:  CBC:   Recent Labs     04/12/22  0533   WBC 7.2   HGB 10.8*   HCT 31.3*   MCV 91.6        BMP:    Recent Labs     04/12/22  1027   *   K 4.3   CO2 31   BUN 13   CREATININE <0.5*     LIVR:   No results for input(s): AST, ALT in the last 72 hours. PT/INR:   No results for input(s): PROT, INR in the last 72 hours. APTT: No results for input(s): APTT in the last 72 hours.     Drip rates at handoff:   none      Electronically signed by Haylee Connolly RN  on 4/10/22 at 4:00 PM EDT

## 2022-04-12 NOTE — PROGRESS NOTES
Comprehensive Nutrition Assessment    Type and Reason for Visit:  Reassess    Nutrition Recommendations/Plan:   1. Continue no added salt diet  2. 1500 ml FR per MD  3. Continue Novoa & Han ONS  4. Encourage nutrition  5. Monitor po intakes, nutrition adequacy, weights, pertinent labs, BMs    Nutrition Assessment:  Follow up: Pt improving some AEB pt report of appetite improving some. Pt currently on a no added salt diet with 1500 ml FR. Po intakes have been 1-75% per EMR. Pt reports that she has been drinking some of her Marsh & Han, but does not enjoy it much. Pt reports that she has been able to drink 1-2 Marsh & Han per day. Pt has a milk allergy, no ONS available at [facility] are suitable for this other than Novoa & Han. Pt denied need for diet education at this time. Will add contact number for RD in AVS for any nutrition questions pt may have. Malnutrition Assessment:  Malnutrition Status: At risk for malnutrition (Comment)    Context:  Acute Illness       Estimated Daily Nutrient Needs:  Energy (kcal):  4911-3039 kcals; Weight Used for Energy Requirements:  Ideal (45 kg)     Protein (g):  45-54 g; Weight Used for Protein Requirements:  Ideal (1-1.2 g/kg)        Fluid (ml/day):  1 ml/kcal      Nutrition Related Findings:  -15.5 L since admission per I&O. BM x1 on 4/8. Na 121 mmol/L. Wounds:  Surgical Incision       Current Nutrition Therapies:    ADULT ORAL NUTRITION SUPPLEMENT; Breakfast, Dinner, Lunch; Other Oral Supplement; Marsh & Han Oral Supplement please  ADULT DIET; Regular;  No Added Salt (3-4 gm); 1500 ml    Anthropometric Measures:  · Height: 5' (152.4 cm)  · Current Body Weight: 105 lb 8 oz (47.9 kg)   · Ideal Body Weight: 100 lbs; % Ideal Body Weight 96 %   · BMI: 20.6  · BMI Categories: Underweight (BMI less than 22) age over 72       Nutrition Diagnosis:   · Increased nutrient needs related to increase demand for energy/nutrients as evidenced by other (comment),weight loss (Prolonged hospital stay and s/p surgery)    Nutrition Interventions:   Food and/or Nutrient Delivery:  Continue Current Diet,Continue Oral Nutrition Supplement  Nutrition Education/Counseling:  Education declined   Coordination of Nutrition Care:  Continue to monitor while inpatient    Goals:  Consume 50% or greater of 3 meals per day and ONS during this admission.        Nutrition Monitoring and Evaluation:   Behavioral-Environmental Outcomes:  None Identified   Food/Nutrient Intake Outcomes:  Food and Nutrient Intake,Supplement Intake  Physical Signs/Symptoms Outcomes:  Biochemical Data,Constipation,Fluid Status or Edema,Weight,Nutrition Focused Physical Findings     Discharge Planning:    Continue current diet,Continue Oral Nutrition Supplement     Electronically signed by Kirstie Claude, RD, LD on 4/12/22 at 1:23 PM EDT    Contact: [phone]

## 2022-04-12 NOTE — PROGRESS NOTES
Shift: 7100-6270    Nursing assessment at handoff  stable    Rhythm changes Atrial Fibrillation/ controlled    Rhythm Intervention: n/a    Drop in Urinary Output no , 2050 mL output  Changes to drips? none  POD 1 ONLY: Pacing Wires Removed: []Yes           [] NO        [] Platelets < 77,147        [] Arrhythmia        [] Bradycardia        [x] Valve Replacement         [] Pacing for Cardiac Index    LDA Insertion Date Date Changed (if needed) Discontinued Date   Art line   4/8/22   Central Line   4/8/22   ET Tube      Panchal   4/8/22   Chest Tube   4/8/22   Leg Drain      Pacing Wires        Surgery, return to OR no     Hospital Course:  POD# 1    POD #2    CT, A-line, IJ, Panchal, removed without complications    POD #2 - Night Shift    POD #3 - Night Shift -   Controlled A-Fib. Vital signs stable. Slightly hypotensive while asleep. Output from site where chest tube was removed has decreased. Dressing from day shift remained dry and intact throughout duration of night shift. Urine output = 1500 mL. Pain well controlled with tramadol overnight. No other significant changes. POD #4 - day shift- pt stable Afib rate controlled. Ambulating to BR well. Minimal pain controlled. Urine output- 950mL. Chest tube Dressing dry and intact, changed 1800. VSS on RA. POD #4 Night- pt stable Afib rate controlled. Ambulating to bathroom well. Minimal pain controlled. Urine output- 2050mL. Chest tube Dressing dry and intact, changed 0000. VSS on RA.      POD #5 Night  PT in stable Afib through night and used O2 PRN throughout shift    Changes in O2 requirements   RA during day, desat to 85% when sleeping   Oxygen Therapy  SpO2: (!) 89 %  Pulse Oximeter Device Mode: Intermittent  Pulse Oximeter Device Location: Finger  O2 Device: None (Room air)  O2 Flow Rate (L/min): 2 L/min     Most recent vitals BP 91/60   Pulse 81   Temp 97.8 °F (36.6 °C)   Resp 20   Ht 5' (1.524 m)   Wt 105 lb 8 oz (47.9 kg)   SpO2 (!) 89%   BMI 20.60 kg/m²       Admission weight Weight: 107 lb (48.5 kg)     Today's weight   Wt Readings from Last 1 Encounters:   04/12/22 105 lb 8 oz (47.9 kg)         Lab Data:  CBC:   Recent Labs     04/12/22  0533   WBC 7.2   HGB 10.8*   HCT 31.3*   MCV 91.6        BMP:    Recent Labs     04/12/22  0534   *   K 4.4   CO2 29   BUN 15   CREATININE <0.5*     LIVR:   No results for input(s): AST, ALT in the last 72 hours. PT/INR:   No results for input(s): PROT, INR in the last 72 hours. APTT: No results for input(s): APTT in the last 72 hours.     Drip rates at handoff:   none      Electronically signed by Woodrow Clifford RN  on 4/10/22 at 4:00 PM EDT

## 2022-04-13 LAB
ANION GAP SERPL CALCULATED.3IONS-SCNC: 6 MMOL/L (ref 3–16)
ANION GAP SERPL CALCULATED.3IONS-SCNC: 7 MMOL/L (ref 3–16)
BUN BLDV-MCNC: 14 MG/DL (ref 7–20)
BUN BLDV-MCNC: 15 MG/DL (ref 7–20)
CALCIUM SERPL-MCNC: 8.2 MG/DL (ref 8.3–10.6)
CALCIUM SERPL-MCNC: 8.6 MG/DL (ref 8.3–10.6)
CHLORIDE BLD-SCNC: 92 MMOL/L (ref 99–110)
CHLORIDE BLD-SCNC: 92 MMOL/L (ref 99–110)
CO2: 28 MMOL/L (ref 21–32)
CO2: 29 MMOL/L (ref 21–32)
CREAT SERPL-MCNC: <0.5 MG/DL (ref 0.6–1.2)
CREAT SERPL-MCNC: <0.5 MG/DL (ref 0.6–1.2)
GFR AFRICAN AMERICAN: >60
GFR AFRICAN AMERICAN: >60
GFR NON-AFRICAN AMERICAN: >60
GFR NON-AFRICAN AMERICAN: >60
GLUCOSE BLD-MCNC: 110 MG/DL (ref 70–99)
GLUCOSE BLD-MCNC: 117 MG/DL (ref 70–99)
GLUCOSE BLD-MCNC: 134 MG/DL (ref 70–99)
GLUCOSE BLD-MCNC: 152 MG/DL (ref 70–99)
GLUCOSE BLD-MCNC: 91 MG/DL (ref 70–99)
GLUCOSE BLD-MCNC: 97 MG/DL (ref 70–99)
HCT VFR BLD CALC: 34.3 % (ref 36–48)
HEMOGLOBIN: 11.4 G/DL (ref 12–16)
MAGNESIUM: 1.8 MG/DL (ref 1.8–2.4)
MCH RBC QN AUTO: 31.5 PG (ref 26–34)
MCHC RBC AUTO-ENTMCNC: 33.3 G/DL (ref 31–36)
MCV RBC AUTO: 94.6 FL (ref 80–100)
PDW BLD-RTO: 13.2 % (ref 12.4–15.4)
PERFORMED ON: ABNORMAL
PERFORMED ON: ABNORMAL
PERFORMED ON: NORMAL
PERFORMED ON: NORMAL
PLATELET # BLD: 297 K/UL (ref 135–450)
PMV BLD AUTO: 6.6 FL (ref 5–10.5)
POTASSIUM SERPL-SCNC: 4.4 MMOL/L (ref 3.5–5.1)
POTASSIUM SERPL-SCNC: 4.4 MMOL/L (ref 3.5–5.1)
RBC # BLD: 3.63 M/UL (ref 4–5.2)
SODIUM BLD-SCNC: 126 MMOL/L (ref 136–145)
SODIUM BLD-SCNC: 128 MMOL/L (ref 136–145)
WBC # BLD: 6.4 K/UL (ref 4–11)

## 2022-04-13 PROCEDURE — 99024 POSTOP FOLLOW-UP VISIT: CPT | Performed by: NURSE PRACTITIONER

## 2022-04-13 PROCEDURE — 6370000000 HC RX 637 (ALT 250 FOR IP): Performed by: INTERNAL MEDICINE

## 2022-04-13 PROCEDURE — 6370000000 HC RX 637 (ALT 250 FOR IP): Performed by: NURSE PRACTITIONER

## 2022-04-13 PROCEDURE — 6360000002 HC RX W HCPCS: Performed by: THORACIC SURGERY (CARDIOTHORACIC VASCULAR SURGERY)

## 2022-04-13 PROCEDURE — 83735 ASSAY OF MAGNESIUM: CPT

## 2022-04-13 PROCEDURE — 85027 COMPLETE CBC AUTOMATED: CPT

## 2022-04-13 PROCEDURE — 2060000000 HC ICU INTERMEDIATE R&B

## 2022-04-13 PROCEDURE — 6370000000 HC RX 637 (ALT 250 FOR IP): Performed by: STUDENT IN AN ORGANIZED HEALTH CARE EDUCATION/TRAINING PROGRAM

## 2022-04-13 PROCEDURE — 6370000000 HC RX 637 (ALT 250 FOR IP): Performed by: THORACIC SURGERY (CARDIOTHORACIC VASCULAR SURGERY)

## 2022-04-13 PROCEDURE — 2580000003 HC RX 258: Performed by: INTERNAL MEDICINE

## 2022-04-13 PROCEDURE — 80048 BASIC METABOLIC PNL TOTAL CA: CPT

## 2022-04-13 PROCEDURE — 94669 MECHANICAL CHEST WALL OSCILL: CPT

## 2022-04-13 PROCEDURE — 2580000003 HC RX 258: Performed by: THORACIC SURGERY (CARDIOTHORACIC VASCULAR SURGERY)

## 2022-04-13 PROCEDURE — 36415 COLL VENOUS BLD VENIPUNCTURE: CPT

## 2022-04-13 PROCEDURE — 94640 AIRWAY INHALATION TREATMENT: CPT

## 2022-04-13 RX ORDER — LIDOCAINE 4 G/G
1 PATCH TOPICAL DAILY
Status: DISCONTINUED | OUTPATIENT
Start: 2022-04-13 | End: 2022-04-14 | Stop reason: HOSPADM

## 2022-04-13 RX ADMIN — TRAMADOL HYDROCHLORIDE 100 MG: 50 TABLET, COATED ORAL at 01:18

## 2022-04-13 RX ADMIN — SODIUM CHLORIDE: 234 INJECTION INTRAMUSCULAR; INTRAVENOUS; SUBCUTANEOUS at 23:30

## 2022-04-13 RX ADMIN — Medication 15 G: at 15:57

## 2022-04-13 RX ADMIN — RIVAROXABAN 20 MG: 20 TABLET, FILM COATED ORAL at 17:51

## 2022-04-13 RX ADMIN — ALBUTEROL SULFATE 2.5 MG: 2.5 SOLUTION RESPIRATORY (INHALATION) at 19:52

## 2022-04-13 RX ADMIN — SODIUM CHLORIDE, PRESERVATIVE FREE 10 ML: 5 INJECTION INTRAVENOUS at 09:06

## 2022-04-13 RX ADMIN — SODIUM CHLORIDE: 234 INJECTION INTRAMUSCULAR; INTRAVENOUS; SUBCUTANEOUS at 16:57

## 2022-04-13 RX ADMIN — ACETAMINOPHEN 650 MG: 325 TABLET ORAL at 10:44

## 2022-04-13 RX ADMIN — TRAMADOL HYDROCHLORIDE 100 MG: 50 TABLET, COATED ORAL at 07:32

## 2022-04-13 RX ADMIN — METOPROLOL TARTRATE 25 MG: 25 TABLET, FILM COATED ORAL at 21:03

## 2022-04-13 RX ADMIN — DIGOXIN 125 MCG: 125 TABLET ORAL at 09:05

## 2022-04-13 RX ADMIN — FAMOTIDINE 20 MG: 20 TABLET ORAL at 21:03

## 2022-04-13 RX ADMIN — LEVOTHYROXINE SODIUM 25 MCG: 0.03 TABLET ORAL at 06:09

## 2022-04-13 RX ADMIN — TRAMADOL HYDROCHLORIDE 100 MG: 50 TABLET, COATED ORAL at 15:57

## 2022-04-13 RX ADMIN — ATORVASTATIN CALCIUM 40 MG: 40 TABLET, FILM COATED ORAL at 21:03

## 2022-04-13 RX ADMIN — TRAMADOL HYDROCHLORIDE 100 MG: 50 TABLET, COATED ORAL at 22:09

## 2022-04-13 RX ADMIN — Medication 400 MG: at 21:03

## 2022-04-13 RX ADMIN — METHOCARBAMOL 500 MG: 500 TABLET ORAL at 10:44

## 2022-04-13 RX ADMIN — Medication 15 ML: at 09:06

## 2022-04-13 RX ADMIN — SODIUM CHLORIDE: 234 INJECTION INTRAMUSCULAR; INTRAVENOUS; SUBCUTANEOUS at 10:34

## 2022-04-13 RX ADMIN — SODIUM CHLORIDE: 234 INJECTION INTRAMUSCULAR; INTRAVENOUS; SUBCUTANEOUS at 03:02

## 2022-04-13 RX ADMIN — ACETAMINOPHEN 650 MG: 325 TABLET ORAL at 17:02

## 2022-04-13 RX ADMIN — GABAPENTIN 100 MG: 100 CAPSULE ORAL at 14:13

## 2022-04-13 RX ADMIN — FAMOTIDINE 20 MG: 20 TABLET ORAL at 09:05

## 2022-04-13 RX ADMIN — METOPROLOL TARTRATE 25 MG: 25 TABLET, FILM COATED ORAL at 09:05

## 2022-04-13 RX ADMIN — GABAPENTIN 100 MG: 100 CAPSULE ORAL at 09:05

## 2022-04-13 RX ADMIN — GABAPENTIN 100 MG: 100 CAPSULE ORAL at 21:03

## 2022-04-13 RX ADMIN — Medication 15 ML: at 21:04

## 2022-04-13 RX ADMIN — ASPIRIN 81 MG: 81 TABLET, COATED ORAL at 09:05

## 2022-04-13 RX ADMIN — ALBUTEROL SULFATE 2.5 MG: 2.5 SOLUTION RESPIRATORY (INHALATION) at 08:03

## 2022-04-13 RX ADMIN — Medication 400 MG: at 09:05

## 2022-04-13 ASSESSMENT — PAIN SCALES - GENERAL
PAINLEVEL_OUTOF10: 3
PAINLEVEL_OUTOF10: 8
PAINLEVEL_OUTOF10: 5
PAINLEVEL_OUTOF10: 8
PAINLEVEL_OUTOF10: 7
PAINLEVEL_OUTOF10: 4
PAINLEVEL_OUTOF10: 0
PAINLEVEL_OUTOF10: 6
PAINLEVEL_OUTOF10: 8
PAINLEVEL_OUTOF10: 8
PAINLEVEL_OUTOF10: 3

## 2022-04-13 ASSESSMENT — PAIN SCALES - WONG BAKER: WONGBAKER_NUMERICALRESPONSE: 2

## 2022-04-13 ASSESSMENT — PAIN DESCRIPTION - FREQUENCY: FREQUENCY: INTERMITTENT

## 2022-04-13 ASSESSMENT — PAIN DESCRIPTION - ORIENTATION
ORIENTATION: RIGHT

## 2022-04-13 ASSESSMENT — PAIN DESCRIPTION - LOCATION
LOCATION: RIB CAGE

## 2022-04-13 ASSESSMENT — PAIN DESCRIPTION - PAIN TYPE
TYPE: ACUTE PAIN;SURGICAL PAIN

## 2022-04-13 ASSESSMENT — PAIN DESCRIPTION - DESCRIPTORS: DESCRIPTORS: DISCOMFORT;ACHING;STABBING

## 2022-04-13 NOTE — PROGRESS NOTES
[facility]  Nephrology Follow Up Note           Reason for Consult:  Hyponatremia  Requesting Physician:  Dr. Gold Aguilar history  Feels better   Still with pain when coughing  Eating plenty of proteins     Last 24 h uop 2l    ROS: No chest pain/shortness of breath/fever/nausea/vomiting  PSFH: No visitor    Scheduled Meds:   lidocaine  1 patch TransDERmal Daily    levothyroxine  25 mcg Oral Daily    gabapentin  100 mg Oral TID    famotidine  20 mg Oral BID    metoprolol tartrate  25 mg Oral BID    [Held by provider] sertraline  25 mg Oral Daily    digoxin  125 mcg Oral Daily    rivaroxaban  20 mg Oral Daily    sodium chloride flush  10 mL IntraVENous 2 times per day    aspirin  81 mg Oral Daily    chlorhexidine  15 mL Mouth/Throat BID    magnesium oxide  400 mg Oral BID    atorvastatin  40 mg Oral Nightly    albuterol  2.5 mg Nebulization Q4H WA     Continuous Infusions:   IV infusion builder 85 mL/hr at 04/13/22 1034    sodium chloride      dextrose       PRN Meds:.traMADol **OR** traMADol, methocarbamol, acetaminophen, sodium chloride flush, sodium chloride, ondansetron **OR** ondansetron, hydrALAZINE, metoprolol, albumin human, glucose, glucagon (rDNA), dextrose, dextrose bolus (hypoglycemia) **OR** dextrose bolus (hypoglycemia)    History of Present Illness on 4/12/22:    75 y.o. 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 Her appetite is poor and was getting lasix, last dose was on 4/10.  TSH is levated  BP has been soft     Physical exam:   Constitutional:  VITALS:  /67   Pulse 79   Temp 98 °F (36.7 °C) (Oral)   Resp 18   Ht 5' (1.524 m)   Wt 107 lb 3.2 oz (48.6 kg)   SpO2 94%   BMI 20.94 kg/m²   Gen: alert, awake  Neck: No JVD  Skin: Unremarkable  Cardiovascular:  S1, S2 without m/r/g   Respiratory: CTA B without w/r/r; respiratory effort normal  Abdomen:  soft, nt, nd,   Extremities: no lower extremity edema  Neuro/Psy: AAoriented times 3 ; moves all 4 ext    Data/  Recent Labs     04/11/22 0414 04/12/22  0533 04/13/22  0439   WBC 7.3 7.2 6.4   HGB 11.8* 10.8* 11.4*   HCT 35.0* 31.3* 34.3*   MCV 93.8 91.6 94.6    263 297     Recent Labs     04/11/22 0414 04/11/22  1444 04/12/22  0534 04/12/22  0534 04/12/22  1027 04/12/22  1444 04/13/22  0439   *   < > 123*   < > 121* 126* 128*   K 5.2*   < > 4.4   < > 4.3 4.6 4.4   CL 83*   < > 87*   < > 85* 87* 92*   CO2 33*   < > 29   < > 31 30 29   GLUCOSE 104*   < > 105*   < > 100* 122* 110*   PHOS  --   --   --   --  2.9 3.5  --    MG 2.10  --  1.80  --   --   --  1.80   BUN 21*   < > 15   < > 13 13 14   CREATININE 0.7   < > <0.5*   < > <0.5* 1.1 <0.5*   LABGLOM >60   < > >60   < > >60 49* >60   GFRAA >60   < > >60   < > >60 60* >60    < > = values in this interval not displayed. Urine sodium 74  urine osm 386    Assessment  -Hyponatremia likely decreased osmolar load ( poor appetite) but also hypothyroidism, pain, zoloft. As Bp is soft , will need to rule out adrenal insufficiency   - s/p MV repair on 4/6/22  -A fib    Plan  -follow cortisol level  - 1.5 % saline at 85 ml/h  -hold zoloft  -decrease gabapentin 100 mg TID ( from 200 mg TID)--> 100 mg HS  -cont FR 1500 ml/day  -encourage good pain control and protein intake  -levothyroxine 25 mcg daily   -serial BMPs    Ok to dc from renal std pt later today    Thank you for the consultation. Please do not hesitate to call with questions. Antonio Strickland MD  Office: [phone]  Fax:    [phone]  [facility]. [facility]

## 2022-04-13 NOTE — PROGRESS NOTES
Oxygen documentation:     1. O2 saturation at REST on ROOM AIR = ___92___%     If saturation is 89% or above please proceed with steps 2 and 3. 2. O2 saturation with AMBULATION of __75___ feet on ROOM AIR = ___87_%   3.  O2 saturation with AMBULATION on current liter flow = ___98___%  On 2 L nasal canula    DCP notified: ______Oxygen documentation:

## 2022-04-13 NOTE — PROGRESS NOTES
Shift: 3177-6750    Nursing assessment at handoff  stable    Rhythm changes Atrial Fibrillation/ controlled    Rhythm Intervention: n/a    Drop in Urinary Output no , 2050 mL output  Changes to drips? none  POD 1 ONLY: Pacing Wires Removed: []Yes           [] NO        [] Platelets < 24,819        [] Arrhythmia        [] Bradycardia        [x] Valve Replacement         [] Pacing for Cardiac Index    LDA Insertion Date Date Changed (if needed) Discontinued Date   Art line   4/8/22   Central Line   4/8/22   ET Tube      Panchal   4/8/22   Chest Tube   4/8/22   Leg Drain      Pacing Wires        Surgery, return to OR no     Hospital Course:  POD# 1    POD #2    CT, A-line, IJ, Panchal, removed without complications    POD #2 - Night Shift    POD #3 - Night Shift -   Controlled A-Fib. Vital signs stable. Slightly hypotensive while asleep. Output from site where chest tube was removed has decreased. Dressing from day shift remained dry and intact throughout duration of night shift. Urine output = 1500 mL. Pain well controlled with tramadol overnight. No other significant changes. POD #4 - day shift- pt stable Afib rate controlled. Ambulating to BR well. Minimal pain controlled. Urine output- 950mL. Chest tube Dressing dry and intact, changed 1800. VSS on RA. POD #4 Night- pt stable Afib rate controlled. Ambulating to bathroom well. Minimal pain controlled. Urine output- 2050mL. Chest tube Dressing dry and intact, changed 0000. VSS on RA. POD #5 Night  PT in stable Afib through night and used O2 PRN throughout shift    POD # 6, 7 am to 3 pm  Sodium level remains low, added levothyroxine for elevated TSH, increased rate of Sodium Chloride 50 meq in 500 ml to 95 ml/hr, had echo today, PRN oxygen 2 L with exertion. POD#6 night 7p-7am  Pt stable through the night. Constant pain at chest tube removal site. Pt would like to take a shower.  Dressing change done    Changes in O2 requirements   RA during day, desat to 85% when sleeping   Oxygen Therapy  SpO2: 95 %  Pulse Oximeter Device Mode: Intermittent  Pulse Oximeter Device Location: Finger  O2 Device: None (Room air)  O2 Flow Rate (L/min): 2 L/min     Most recent vitals /67   Pulse 114   Temp 98.5 °F (36.9 °C) (Oral)   Resp 16   Ht 5' (1.524 m)   Wt 107 lb 3.2 oz (48.6 kg)   SpO2 95%   BMI 20.94 kg/m²       Admission weight Weight: 107 lb (48.5 kg)     Today's weight   Wt Readings from Last 1 Encounters:   04/13/22 107 lb 3.2 oz (48.6 kg)         Lab Data:  CBC:   Recent Labs     04/13/22  0439   WBC 6.4   HGB 11.4*   HCT 34.3*   MCV 94.6        BMP:    Recent Labs     04/13/22  0439   *   K 4.4   CO2 29   BUN 14   CREATININE <0.5*     LIVR:   No results for input(s): AST, ALT in the last 72 hours. PT/INR:   No results for input(s): PROT, INR in the last 72 hours. APTT: No results for input(s): APTT in the last 72 hours.     Drip rates at handoff:   none      Electronically signed by Sarah Wing RN  on 4/10/22 at 4:00 PM EDT

## 2022-04-13 NOTE — PROGRESS NOTES
CVTS Cardiothoracic Progress Note:                                CC:  Post op follow up     Surgery: 4/6 MINIMAL ACCESS MITRAL VALVE REPAIR USING HUTCHINSON 38MM PHYSIO II ANNULOPLASTY RING, RIGHT SIDED MAZE, LEFT ATERIAL APPENDAGE CLIP PLACEMENT, CRYO NERVE ABLATION, ON PUMP, WITH BILATERAL PECTORALIS BLOCKS, XAVIER, AND TEMPORARY PACING [facility] course:  4/7 Hemodynamically stable overnight. On low dose levo for BP support. 4/8 No acute events overnight. BPs soft but adequate. Remains in atrial fibrillation. 4/9 doing well  4/10 much improved  4/11 No acute events over the weekend. Hemodynamically stable, rate controlled afib. 4/12 Na downtrending again yesterday to 122. Otherwise doing very well. 4/13 Reports pain overnight. Otherwise doing ok. Sodium improving.        Past Medical History:   Diagnosis Date    Varicose vein         Past Surgical History:   Procedure Laterality Date    MITRAL VALVE REPAIR N/A 4/6/2022    MINIMAL ACCESS MITRAL VALVE REPAIR USING HUTCHINSON 38MM PHYSIO II ANNULOPLASTY RING, RIGHT SIDED MAZE, LEFT ATERIAL APPENDAGE CLIP PLACEMENT, CRYO NERVE ABLATION, ON PUMP, WITH BILATERAL PECTORALIS BLOCKS, XAVIER, AND TEMPORARY PACING WIRE PLACEMENT performed by Brittnee Boyle MD at [facility] as of 03/30/2022 - Fully Reviewed 03/30/2022   Allergen Reaction Noted    Dairycare [lactase-lactobacillus]  03/30/2022    Eggs or egg-derived products  03/30/2022        Patient Active Problem List   Diagnosis    Atrial fibrillation with RVR ([address]    New onset atrial fibrillation (HCC)    LAFB (left anterior fascicular block)    Cardiomegaly    Pansystolic murmur        Vital Signs: /67   Pulse 79   Temp 98 °F (36.7 °C) (Oral)   Resp 18   Ht 5' (1.524 m)   Wt 107 lb 3.2 oz (48.6 kg)   SpO2 94%   BMI 20.94 kg/m²  O2 Flow Rate (L/min): 2 L/min     Admission Weight: Weight: 107 lb (48.5 kg)    Weight on 4/6 (43.6 kg) Pre-op   Weight on 4/7 (46.2 kg) +2.6 kg   4/8 47.3 kg +3.7 kg   4/9 48.4 kg   4/10 48.8 kg   4/11 46.5 kg +2.9 kg   4/12 47.9 kg +4.3 kg   4/13 48.6 kg +5 kg     Intake/Output:     Intake/Output Summary (Last 24 hours) at 4/13/2022 1137  Last data filed at 4/13/2022 0958  Gross per 24 hour   Intake 884 ml   Output 2040 ml   Net -1156 ml        LABORATORY DATA:     CBC:   Recent Labs     04/11/22  0414 04/12/22  0533 04/13/22  0439   WBC 7.3 7.2 6.4   HGB 11.8* 10.8* 11.4*   HCT 35.0* 31.3* 34.3*   MCV 93.8 91.6 94.6    263 297     BMP:   Recent Labs     04/12/22  1027 04/12/22  1444 04/13/22  0439   * 126* 128*   K 4.3 4.6 4.4   CL 85* 87* 92*   CO2 31 30 29   PHOS 2.9 3.5  --    BUN 13 13 14   CREATININE <0.5* 1.1 <0.5*     MG:    Recent Labs     04/11/22  0414 04/12/22  0534 04/13/22  0439   MG 2.10 1.80 1.80      CXR: 4/9/22  FINDINGS:   Cardiac and mediastinal silhouettes appear similar.  Soft tissue emphysema   overlying the right chest wall.  There is a trace right apical pneumothorax   measuring just under 9 mm from pleural to pleural surface.  The right-sided   chest tube has been removed.  Osseous structures appear similar.  Bibasilar   atelectasis.  Small left effusion.  Similar osseous structures.  Rib   fractures are noted.           Impression   Interval right chest tube removal, with a trace right apical pneumothorax   measuring just under 9 mm from pleural to pleural surface.       Bibasilar airspace disease and small left effusion.       Soft tissue emphysema seen over the right neck base and chest wall. TTE: 4/12/22   Summary   Limited echo for LV function/post op eval with limited doppler/color. Global left ventricular function is mildly decreased with ejection fraction   estimated at 45%. EF by Guzman's method estimated at 45%. Mild concentric left ventricular hypertrophy. Hypokinesis of the anteroseptal and inferoseptal wall segments.    Diastolic function not assessed due to mitral prosthesis. The right ventricle is normal in size with reduced function. The left atrium is severely dilated. The right atrium is moderately dilated. The anterior mitral valve leaflet appears thickened without reduced   mobility. Normally functioning \"HUTCHINSON 38MM PHYSIO II ANNULOPLASTY RING\" in the   mitral position with a max velocity of 1.27 m/sec, maximum gradient of 7   mmHg and a mean gradient of 2 mmHg. Mild to moderate mitral regurgitation. Mild to moderate tricuspid valve regurgitation. Systolic pulmonary artery pressure (SPAP) estimated at 42 mmHg (RA pressure   8 mmHg), consistent withmild pulmonary hypertension. There is a moderate localized near left ventricle, remaining appears small   pericardial effusion noted. Irregular heart rate throughout study. ________________________________________________________________________    Subjective:   Dietary Intake: slowly improving   no Nausea   Pain Control: needs improvement     Complaints: post op pain   Bowels: + BM 4/13    Objective:   General appearance: sitting up in bed, in nad   Lungs: diminished bilateral bases   Heart: S1S2 normal, irregularly irregular; controlled afib on monitor  Chest: symmetrical expansion with inspiration and expirations; Right chest wall crepitus, improving   Abdomen: soft, non-tender  Bowel sounds: normoactive   Kidneys: UOP 2040 ml over 24 hours; Cr 0.5  Wound/Incisions: Right chest wall incision CDI; pacing wires out 4/7  Extremities: BLE pulses present; trace swelling noted in BLE  Neurological: alert, oriented and grossly non-focal   Chest tubes/Drains: all out     Assessment:   Post-op: 7 days. Condition: In stable condition. Plan:   1. Cardiovascular: s/p MVr with annuloplasty ring, right sided MAZE, ISAAC clip   ASA, statin, BB  Chronic, persistent atrial fibrillation with gigantic left atrium- rate controlled. On dig, BBkaylarelto  Repeat echo as above, stable     2.  Pulmonary:   Requiring 2L of oxygen with ambulation, will need home O2 at discharge   Continue expansion measures- IS, acapella, oobtc, ambulation  Albuterol every 4 hours while awake. EZPAP     3. Neurology:   Post operative pain- PRN tylenol, oxy. Scheduled gabapentin. Lidoderm patch added     4. Nephrology:   Weight still up from pre-op  Adequate 24 hour UOP with Cr of 0.5  Hyponatremia- na 128. Nephrology following. On 1.5% saline. 5. Endocrinology:   Glucose stable  No hx of DM. A1C 5.6 on 4/6    6. Hematology:   Acute blood loss anemia- H&H stable  Post operative thrombocytopenia- resolved     7. Microbiology:   No issues at present     8. Nutrition:   Continue diet as ordered. ONS     9. Labs:   AM labs and imaging reviewed as above     10. Post-op Drains/Wires: all out     11. D/C Goals: CM following. Will need HHC, home PT/OT, home oxygen. Likely home today or tomorrow pending improvement in sodium     12.  Continue post-op care of patient     GI prophylaxis: pepcid   DVT prophylaxis: xarelto   ________________________________________________________________________  CHARLES Dodge CNP  4/13/2022  11:37 AM

## 2022-04-13 NOTE — ADT AUTH CERT
Atrial Fibrillation - Care Day 12 (4/10/2022) by Catherine Clemens RN       Review Status Review Entered   Completed 4/12/2022 13:21      Criteria Review      Care Day: 12 Care Date: 4/10/2022 Level of Care: Telemetry    Guideline Day 2    Level Of Care    (X) ICU, intermediate care, or telemetry to discharge    4/12/2022 1:21 PM EDT by Tomer Crump      ICU    Clinical Status    ( ) * Hemodynamic stability    4/12/2022 1:21 PM EDT by Tomer Crump      IN 80, BP 95/51 mmHg,    (X) * Sinus rhythm or acceptable ventricular rate    4/12/2022 1:21 PM EDT by Tomer Crump      YES    (X) * No evidence of myocardial ischemia    4/12/2022 1:21 PM EDT by Tomer Crump      NONE    (X) * Mental status at baseline    4/12/2022 1:21 PM EDT by Tomer Crump      alert, oriented and grossly non-focal    (X) * Tachypnea absent    4/12/2022 1:21 PM EDT by Macho Muñiz. symmetrical expansion with inspiration and expirations; no rocking of sternum noted.     (X) * Hypoxemia absent    4/12/2022 1:21 PM EDT by Tomer Crump      ABSENT . SPO2 96% room air    (X) * Anticoagulants regimen for next level of care established    4/12/2022 1:21 PM EDT by Sanford Frias    ( ) * Antiarrhythmic medication absent or no requirement for further inpatient ECG monitoring    4/12/2022 1:21 PM EDT by Tomer Crump      metoprolol tartrate (LOPRESSOR) tablet 25 mg  Dose: 25 mg  Freq: 2 TIMES DAILY Route: PO X2    ( ) * Discharge plans and education understood    Activity    (X) * Ambulatory or acceptable for next level of care    Routes    (X) * Oral hydration    4/12/2022 1:21 PM EDT by Tomer Crump      [id number] ml    (X) * Oral medications or regimen acceptable for next level of care    4/12/2022 1:21 PM EDT by Tomer Crump      atorvastatin (LIPITOR) tablet 40 mg PO X1  famotidine (PEPCID) tablet 20 mg PO X2  gabapentin (NEURONTIN) capsule 200 mg PO X3  magnesium oxide (MAG-OX) tablet 400 mg PO X2  potassium chloride (KLOR-CON M) extended release tablet 10 mEq PO X3    (X) * Oral diet or acceptable for next level of care    4/12/2022 1:21 PM EDT by Jordan Stanford DIET; Regular; No Added Salt (3-4 gm); 1500 ml    Interventions    (X) Cardiac monitoring    4/12/2022 1:21 PM EDT by Alexia Mart monitoring    Medications    (X) Anticoagulants    4/12/2022 1:21 PM EDT by Yaw Brown      aspirin EC tablet 81 mg  Dose: 81 mg  Freq: DAILY Route: PO    (X) Possible rate and rhythm control medications    4/12/2022 1:21 PM EDT by Yaw Brown      digoxin (LANOXIN) tablet 125 mcg  Dose: 125 mcg  Freq: DAILY Route: PO    * Milestone   Additional Notes   DATE 4/10/22 ICU         Pertinent Updates:   Back on 1L NC this morning, wean as able    Albuterol every 4 hours while awake. Hypertonic nebs. 1500 ml fluid restriction. Vitals:   TEMP 98 (36.7) ORAL, RR 16, MN 80, BP 95/51 mmHg, SPO2 96% room air         Abnl/Pertinent Labs/Radiology/Diagnostic Studies:   Sodium 126 Low    Chloride 85 Low   CO2 33 High     BUN 25 High        RBC 3.75 Low    Hemoglobin 11.8 Low      Hematocrit 34.9 Low       POC Glucose 100 High   and 125 High     Calcium, Ion 1.10 Low            Physical Exam:   General appearance: resting in bed, in nad    Lungs: diminished bilateral bases    Heart: S1S2 normal, irregularly irregular; controlled afib on monitor   Chest: symmetrical expansion with inspiration and expirations; no rocking of sternum noted.  Right chest wall crepitus    Kidneys: -225-224=1794 ml over 24 hours; Cr 0.6   Wound/Incisions: Right chest wall incision with dressing CDI; pacing wires out 4/7   Extremities: BLE pulses present; trace swelling noted in BLE   Neurological: alert, oriented and grossly non-focal    Chest tubes/Drains: Chest tube # 1 with -359=913 ml serosanguinous drainage in 24 hours; small airleak noted           MD Consults/Assessments & Plans:   Cardiothoracic Surgery 4/10      Assessment:    Post-op: 2 days. Condition: In stable condition.        Plan:    1. Cardiovascular: s/p MVr with annuloplasty ring, right sided MAZE, ISAAC clip    ASA, statin, BB   Chronic, persistent atrial fibrillation with gigantic left atrium- rate controlled. On dig, BB, xarelto        2. Pulmonary:    Back on 1L NC this morning, wean as able    Expansion measures- IS, acapella, oobtc, ambulation   Albuterol every 4 hours while awake. Hypertonic nebs. EZPAP        3. Neurology:    Post operative pain- PRN tylenol, oxy. Still with moderate post op pain- trial scheduled gabapentin, increased 4/9 much improved       4. Nephrology:    Weight still up from pre-op   Adequate 24 hour UOP with Cr of 0.6   Diurese as tolerated- PO lasix today   Na 126 from 122, no symptoms       5. Endocrinology:    Glucose stable on Lantus and SSI    No hx of DM. A1C 5.6 on 4/6       6. Hematology:    Acute blood loss anemia- H&H stable   Post operative thrombocytopenia- plts improved at 129       7. Microbiology:    No issues at present        8. Nutrition:    Continue diet as ordered. ONS        9. Labs:    AM labs and imaging reviewed as above    Hyponatremia- na 126 from 122. 1500 ml fluid restriction. Monitor. asymptomatic       10. Post-op Drains/Wires: Keeping chest tube for today. Remove arterial line and CVC.     Left IJ CVC (4/6)    Left brachial caesar (4/6)        11. D/C Goals: CM following. Will await PT/OT recs. Pt lives at home alone PTA and does not have any family members that would be able to stay with her following surgery.        12. Continue post-op care of patient in the ICU. Improving. Sodium 126 from 122.  Anticipate home tomorrow       GI prophylaxis: pepcid    DVT prophylaxis: xarelto                Medications:    albuterol (PROVENTIL) nebulizer solution 2.5 mg   Dose: 2.5 mg   Freq: EVERY 4 HOURS WHILE AWAKE Route: NEBULIZATION X3    sodium chloride (Inhalant) 3 % nebulizer solution 4 mL Dose: 4 mL   Freq: 3 TIMES DAILY Route: NEBULIZATIONX2      Freq: 3 TIMES DAILY WITH MEALS Route: PO X3   rivaroxaban (XARELTO) tablet 20 mg   Dose: 20 mg   Freq: DAILY Route: PO   sertraline (ZOLOFT) tablet 25 mg   Dose: 25 mg   Freq: DAILY Route: PO   spironolactone (ALDACTONE) tablet 25 mg   Dose: 25 mg   Freq: DAILY Route: PO   traMADol (ULTRAM) tablet 50 mg   Dose: 50 mg   Freq: EVERY 6 HOURS PRN Route: PO X1      insulin glargine (LANTUS) injection vial 7 Units   Dose: 0.15 Units/kg   Weight Dosing Info: 43.6 kg   Freq: NIGHTLY Route: SC X1               Orders:   -Intake and output    -ADULT DIET; Regular; No Added Salt (3-4 gm); 1500 ml          PT/OT/SLP/CM Assessments or Notes:     Physical Therapy 4/10   Assessment: Pt has progressed well with therapy. Supervision for mobility this date without device. Pt declines concerns about mobility at home. Plans to stay with nephew. Will d/c acute PT d/t supervision with mobility and goals met.

## 2022-04-14 VITALS
RESPIRATION RATE: 17 BRPM | TEMPERATURE: 98.8 F | SYSTOLIC BLOOD PRESSURE: 112 MMHG | HEIGHT: 60 IN | BODY MASS INDEX: 21.05 KG/M2 | WEIGHT: 107.2 LBS | HEART RATE: 62 BPM | OXYGEN SATURATION: 97 % | DIASTOLIC BLOOD PRESSURE: 61 MMHG

## 2022-04-14 LAB
ANION GAP SERPL CALCULATED.3IONS-SCNC: 6 MMOL/L (ref 3–16)
BUN BLDV-MCNC: 18 MG/DL (ref 7–20)
CALCIUM SERPL-MCNC: 8.7 MG/DL (ref 8.3–10.6)
CHLORIDE BLD-SCNC: 95 MMOL/L (ref 99–110)
CO2: 28 MMOL/L (ref 21–32)
CREAT SERPL-MCNC: <0.5 MG/DL (ref 0.6–1.2)
GFR AFRICAN AMERICAN: >60
GFR NON-AFRICAN AMERICAN: >60
GLUCOSE BLD-MCNC: 106 MG/DL (ref 70–99)
GLUCOSE BLD-MCNC: 120 MG/DL (ref 70–99)
GLUCOSE BLD-MCNC: 92 MG/DL (ref 70–99)
HCT VFR BLD CALC: 33.4 % (ref 36–48)
HEMOGLOBIN: 11.2 G/DL (ref 12–16)
MAGNESIUM: 1.8 MG/DL (ref 1.8–2.4)
MCH RBC QN AUTO: 31.6 PG (ref 26–34)
MCHC RBC AUTO-ENTMCNC: 33.5 G/DL (ref 31–36)
MCV RBC AUTO: 94.4 FL (ref 80–100)
PDW BLD-RTO: 13.2 % (ref 12.4–15.4)
PERFORMED ON: ABNORMAL
PERFORMED ON: NORMAL
PLATELET # BLD: 377 K/UL (ref 135–450)
PMV BLD AUTO: 6.5 FL (ref 5–10.5)
POTASSIUM SERPL-SCNC: 4.7 MMOL/L (ref 3.5–5.1)
RBC # BLD: 3.54 M/UL (ref 4–5.2)
SODIUM BLD-SCNC: 129 MMOL/L (ref 136–145)
WBC # BLD: 9.2 K/UL (ref 4–11)

## 2022-04-14 PROCEDURE — 80048 BASIC METABOLIC PNL TOTAL CA: CPT

## 2022-04-14 PROCEDURE — 6370000000 HC RX 637 (ALT 250 FOR IP): Performed by: THORACIC SURGERY (CARDIOTHORACIC VASCULAR SURGERY)

## 2022-04-14 PROCEDURE — 36415 COLL VENOUS BLD VENIPUNCTURE: CPT

## 2022-04-14 PROCEDURE — 6360000002 HC RX W HCPCS: Performed by: THORACIC SURGERY (CARDIOTHORACIC VASCULAR SURGERY)

## 2022-04-14 PROCEDURE — 6370000000 HC RX 637 (ALT 250 FOR IP): Performed by: NURSE PRACTITIONER

## 2022-04-14 PROCEDURE — 94669 MECHANICAL CHEST WALL OSCILL: CPT

## 2022-04-14 PROCEDURE — 2580000003 HC RX 258: Performed by: INTERNAL MEDICINE

## 2022-04-14 PROCEDURE — 99024 POSTOP FOLLOW-UP VISIT: CPT | Performed by: NURSE PRACTITIONER

## 2022-04-14 PROCEDURE — 6370000000 HC RX 637 (ALT 250 FOR IP): Performed by: INTERNAL MEDICINE

## 2022-04-14 PROCEDURE — 94640 AIRWAY INHALATION TREATMENT: CPT

## 2022-04-14 PROCEDURE — 6370000000 HC RX 637 (ALT 250 FOR IP): Performed by: STUDENT IN AN ORGANIZED HEALTH CARE EDUCATION/TRAINING PROGRAM

## 2022-04-14 PROCEDURE — 83735 ASSAY OF MAGNESIUM: CPT

## 2022-04-14 PROCEDURE — 2580000003 HC RX 258: Performed by: THORACIC SURGERY (CARDIOTHORACIC VASCULAR SURGERY)

## 2022-04-14 PROCEDURE — 85027 COMPLETE CBC AUTOMATED: CPT

## 2022-04-14 RX ORDER — LEVOTHYROXINE SODIUM 0.03 MG/1
25 TABLET ORAL DAILY
Qty: 30 TABLET | Refills: 3 | Status: SHIPPED | OUTPATIENT
Start: 2022-04-15 | End: 2022-07-26 | Stop reason: SDUPTHER

## 2022-04-14 RX ORDER — OXYCODONE HYDROCHLORIDE 5 MG/1
5 TABLET ORAL EVERY 6 HOURS PRN
Qty: 28 TABLET | Refills: 0 | Status: SHIPPED | OUTPATIENT
Start: 2022-04-14 | End: 2022-04-17

## 2022-04-14 RX ORDER — LANOLIN ALCOHOL/MO/W.PET/CERES
400 CREAM (GRAM) TOPICAL 2 TIMES DAILY
Qty: 30 TABLET | Refills: 0 | Status: SHIPPED | OUTPATIENT
Start: 2022-04-14 | End: 2022-04-26 | Stop reason: ALTCHOICE

## 2022-04-14 RX ORDER — FAMOTIDINE 20 MG/1
20 TABLET, FILM COATED ORAL 2 TIMES DAILY
Qty: 60 TABLET | Refills: 3 | Status: SHIPPED | OUTPATIENT
Start: 2022-04-14 | End: 2022-07-26 | Stop reason: SDUPTHER

## 2022-04-14 RX ORDER — GABAPENTIN 100 MG/1
100 CAPSULE ORAL 3 TIMES DAILY
Qty: 90 CAPSULE | Refills: 3 | Status: SHIPPED | OUTPATIENT
Start: 2022-04-14 | End: 2022-05-24 | Stop reason: ALTCHOICE

## 2022-04-14 RX ORDER — ATORVASTATIN CALCIUM 40 MG/1
40 TABLET, FILM COATED ORAL NIGHTLY
Qty: 30 TABLET | Refills: 3 | Status: SHIPPED | OUTPATIENT
Start: 2022-04-14 | End: 2022-07-26 | Stop reason: SDUPTHER

## 2022-04-14 RX ORDER — OXYCODONE HYDROCHLORIDE 5 MG/1
5 TABLET ORAL EVERY 6 HOURS PRN
Status: DISCONTINUED | OUTPATIENT
Start: 2022-04-14 | End: 2022-04-14 | Stop reason: HOSPADM

## 2022-04-14 RX ORDER — FUROSEMIDE 20 MG/1
20 TABLET ORAL DAILY
Qty: 60 TABLET | Refills: 3 | Status: SHIPPED | OUTPATIENT
Start: 2022-04-15 | End: 2022-04-26 | Stop reason: ALTCHOICE

## 2022-04-14 RX ORDER — FUROSEMIDE 20 MG/1
20 TABLET ORAL DAILY
Status: DISCONTINUED | OUTPATIENT
Start: 2022-04-14 | End: 2022-04-14 | Stop reason: HOSPADM

## 2022-04-14 RX ORDER — DIGOXIN 125 MCG
125 TABLET ORAL DAILY
Qty: 30 TABLET | Refills: 3 | Status: SHIPPED | OUTPATIENT
Start: 2022-04-15 | End: 2022-07-26 | Stop reason: SDUPTHER

## 2022-04-14 RX ADMIN — GABAPENTIN 100 MG: 100 CAPSULE ORAL at 09:05

## 2022-04-14 RX ADMIN — Medication 15 G: at 09:05

## 2022-04-14 RX ADMIN — RIVAROXABAN 20 MG: 20 TABLET, FILM COATED ORAL at 18:59

## 2022-04-14 RX ADMIN — FAMOTIDINE 20 MG: 20 TABLET ORAL at 09:05

## 2022-04-14 RX ADMIN — Medication 400 MG: at 09:05

## 2022-04-14 RX ADMIN — SODIUM CHLORIDE: 234 INJECTION INTRAMUSCULAR; INTRAVENOUS; SUBCUTANEOUS at 06:37

## 2022-04-14 RX ADMIN — METHOCARBAMOL 500 MG: 500 TABLET ORAL at 09:05

## 2022-04-14 RX ADMIN — SODIUM CHLORIDE, PRESERVATIVE FREE 10 ML: 5 INJECTION INTRAVENOUS at 09:07

## 2022-04-14 RX ADMIN — METHOCARBAMOL 500 MG: 500 TABLET ORAL at 15:09

## 2022-04-14 RX ADMIN — GABAPENTIN 100 MG: 100 CAPSULE ORAL at 15:09

## 2022-04-14 RX ADMIN — TRAMADOL HYDROCHLORIDE 100 MG: 50 TABLET, COATED ORAL at 04:30

## 2022-04-14 RX ADMIN — ALBUTEROL SULFATE 2.5 MG: 2.5 SOLUTION RESPIRATORY (INHALATION) at 11:42

## 2022-04-14 RX ADMIN — METOPROLOL TARTRATE 25 MG: 25 TABLET, FILM COATED ORAL at 09:05

## 2022-04-14 RX ADMIN — FUROSEMIDE 20 MG: 20 TABLET ORAL at 11:58

## 2022-04-14 RX ADMIN — LEVOTHYROXINE SODIUM 25 MCG: 0.03 TABLET ORAL at 05:24

## 2022-04-14 RX ADMIN — OXYCODONE 5 MG: 5 TABLET ORAL at 15:09

## 2022-04-14 RX ADMIN — ASPIRIN 81 MG: 81 TABLET, COATED ORAL at 09:05

## 2022-04-14 RX ADMIN — OXYCODONE 5 MG: 5 TABLET ORAL at 09:05

## 2022-04-14 RX ADMIN — Medication 15 ML: at 09:07

## 2022-04-14 RX ADMIN — DIGOXIN 125 MCG: 125 TABLET ORAL at 09:05

## 2022-04-14 ASSESSMENT — PAIN DESCRIPTION - ORIENTATION
ORIENTATION: RIGHT
ORIENTATION: RIGHT

## 2022-04-14 ASSESSMENT — PAIN SCALES - GENERAL
PAINLEVEL_OUTOF10: 6
PAINLEVEL_OUTOF10: 8
PAINLEVEL_OUTOF10: 6
PAINLEVEL_OUTOF10: 8
PAINLEVEL_OUTOF10: 6

## 2022-04-14 ASSESSMENT — PAIN DESCRIPTION - LOCATION
LOCATION: RIB CAGE
LOCATION: RIB CAGE;NECK
LOCATION: NECK

## 2022-04-14 ASSESSMENT — PAIN DESCRIPTION - PROGRESSION
CLINICAL_PROGRESSION: GRADUALLY IMPROVING
CLINICAL_PROGRESSION: NOT CHANGED
CLINICAL_PROGRESSION: GRADUALLY IMPROVING

## 2022-04-14 ASSESSMENT — PAIN DESCRIPTION - ONSET
ONSET: PROGRESSIVE
ONSET: SUDDEN

## 2022-04-14 ASSESSMENT — PAIN DESCRIPTION - FREQUENCY
FREQUENCY: INTERMITTENT
FREQUENCY: INTERMITTENT

## 2022-04-14 ASSESSMENT — PAIN SCALES - WONG BAKER: WONGBAKER_NUMERICALRESPONSE: 2

## 2022-04-14 ASSESSMENT — PAIN DESCRIPTION - PAIN TYPE
TYPE: ACUTE PAIN;SURGICAL PAIN

## 2022-04-14 ASSESSMENT — PAIN DESCRIPTION - DESCRIPTORS
DESCRIPTORS: ACHING;DISCOMFORT
DESCRIPTORS: DISCOMFORT;ACHING

## 2022-04-14 NOTE — PROGRESS NOTES
Patient's EF (Ejection Fraction) is greater than 40%    Heart Failure Medications:   Diuretics[de-identified] Torsemide, Spironolactone, Metalozone, Other and None     (One of the following REQUIRED for EF </= 40%/SYSTOLIC FAILURE but MAY be used in EF% >40%/DIASTOLIC FAILURE)        ACE[de-identified] None        ARB[de-identified] None         ARNI[de-identified] None    (Beta Blockers)   NON- Evidenced Based Beta Blocker (for EF% >40%/DIASTOLIC FAILURE): Metoprolol TARTrate- Lopressor     Evidenced Based Beta Blocker::(REQUIRED for EF% <40%/SYSTOLIC FAILURE) None  . .................................................................................................................................................. Patient's weights and intake/output reviewed: Yes    Patient's Last Weight: 106 lbs obtained by standing scale. Difference of 1 lbs more than last documented weight. Intake/Output Summary (Last 24 hours) at 4/14/2022 1516  Last data filed at 4/14/2022 1509  Gross per 24 hour   Intake 2371.92 ml   Output 2100 ml   Net 271.92 ml       Comorbidities Reviewed Yes    Patient has a past medical history of Varicose vein. >>For CHF and Comorbidity documentation on Education Time and Topics, please see Education Tab    Progressive Mobility Assessment:  What is this patient's Current Level of Mobility?: Ambulatory-Up Ad Anisha  How was this patient Mobilized today?: Edge of Bed, Up to Chair, Bedside Commode,  Up to Toilet/Shower, Up in Hallway, Unable to Mobilize and Patient Refuses to Mobilize, ambulated 25 ft                 With Whom? Nurse, PCA, PT and OT                 Level of Difficulty/Assistance: Independent     Pt resting in bed at this time on room air. Pt denies shortness of breath. Pt without lower extremity edema.      Patient and/or Family's stated Goal of Care this Admission: reduce shortness of breath, better understand heart failure and disease management, be more comfortable and reduce lower extremity edema prior to discharge        :

## 2022-04-14 NOTE — PLAN OF CARE
Problem: OXYGENATION/RESPIRATORY FUNCTION  Goal: Patient will maintain patent airway  Outcome: Met This Shift  Goal: Patient will achieve/maintain normal respiratory rate/effort  Description: Respiratory rate and effort will be within normal limits for the patient  Outcome: Met This Shift     Problem: FLUID AND ELECTROLYTE IMBALANCE  Goal: Fluid and electrolyte balance are achieved/maintained  Outcome: Ongoing     Problem: ACTIVITY INTOLERANCE/IMPAIRED MOBILITY  Goal: Mobility/activity is maintained at optimum level for patient  Outcome: Ongoing

## 2022-04-14 NOTE — PROGRESS NOTES
[facility]  Nephrology Follow Up Note           Reason for Consult:  Hyponatremia  Requesting Physician:  Dr. Telly Francis history  Pain control better with current meds  Na up to 129    Last 24 h uop 1.7 l, 1.1 thus far since am    ROS: No chest pain/shortness of breath/fever/nausea/vomiting  PSFH: No visitor    Scheduled Meds:   furosemide  20 mg Oral Daily    lidocaine  1 patch TransDERmal Daily    urea  15 g Oral BID    levothyroxine  25 mcg Oral Daily    gabapentin  100 mg Oral TID    famotidine  20 mg Oral BID    metoprolol tartrate  25 mg Oral BID    digoxin  125 mcg Oral Daily    rivaroxaban  20 mg Oral Daily    sodium chloride flush  10 mL IntraVENous 2 times per day    aspirin  81 mg Oral Daily    chlorhexidine  15 mL Mouth/Throat BID    magnesium oxide  400 mg Oral BID    atorvastatin  40 mg Oral Nightly    albuterol  2.5 mg Nebulization Q4H WA     Continuous Infusions:   sodium chloride      dextrose       PRN Meds:.oxyCODONE, methocarbamol, acetaminophen, sodium chloride flush, sodium chloride, ondansetron **OR** ondansetron, hydrALAZINE, metoprolol, albumin human, glucose, glucagon (rDNA), dextrose, dextrose bolus (hypoglycemia) **OR** dextrose bolus (hypoglycemia)    History of Present Illness on 4/12/22:    75 y.o. 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 Her appetite is poor and was getting lasix, last dose was on 4/10.  TSH is levated  BP has been soft     Physical exam:   Constitutional:  VITALS:  /61   Pulse 62   Temp 98.8 °F (37.1 °C) (Oral)   Resp 17   Ht 5' (1.524 m)   Wt 107 lb 3.2 oz (48.6 kg)   SpO2 97%   BMI 20.94 kg/m²   Gen: alert, awake  Neck: No JVD  Skin: Unremarkable  Cardiovascular:  S1, S2 without m/r/g   Respiratory: CTA B without w/r/r; respiratory effort normal  Abdomen: soft, nt, nd,   Extremities: no lower extremity edema  Neuro/Psy: AAoriented times 3 ; moves all 4 ext    Data/  Recent Labs     04/12/22  0533 04/13/22  0439 04/14/22  0440   WBC 7.2 6.4 9.2   HGB 10.8* 11.4* 11.2*   HCT 31.3* 34.3* 33.4*   MCV 91.6 94.6 94.4    297 377     Recent Labs     04/12/22  0534 04/12/22  0534 04/12/22  1027 04/12/22  1027 04/12/22  1444 04/12/22  1444 04/13/22  0439 04/13/22  1331 04/14/22  0440   *   < > 121*   < > 126*   < > 128* 126* 129*   K 4.4   < > 4.3   < > 4.6   < > 4.4 4.4 4.7   CL 87*   < > 85*   < > 87*   < > 92* 92* 95*   CO2 29   < > 31   < > 30   < > 29 28 28   GLUCOSE 105*   < > 100*   < > 122*   < > 110* 134* 120*   PHOS  --   --  2.9  --  3.5  --   --   --   --    MG 1.80  --   --   --   --   --  1.80  --  1.80   BUN 15   < > 13   < > 13   < > 14 15 18   CREATININE <0.5*   < > <0.5*   < > 1.1   < > <0.5* <0.5* <0.5*   LABGLOM >60   < > >60   < > 49*   < > >60 >60 >60   GFRAA >60   < > >60   < > 60*   < > >60 >60 >60    < > = values in this interval not displayed. Urine sodium 74  urine osm 386    Assessment  -Hyponatremia likely decreased osmolar load ( poor appetite) but also hypothyroidism, pain, zoloft. As Bp is soft , will need to rule out adrenal insufficiency   - s/p MV repair on 4/6/22  -A fib    Plan  -follow cortisol level; DC IVF   -hold zoloft  -change gabapentin 100 mg HS  -cont FR 1500 ml/day  -encourage good pain control and protein intake  -levothyroxine 25 mcg daily   -serial BMPs    Ok to dc from renal std pt     Thank you for the consultation. Please do not hesitate to call with questions. Marianna Harada, MD  Office: [phone]  Fax:    [phone]  [email or web address]

## 2022-04-14 NOTE — PROGRESS NOTES
Shift: 4044-4727    Nursing assessment at handoff  stable    Rhythm changes Atrial Fibrillation/ controlled    Rhythm Intervention: n/a    Drop in Urinary Output no , 2050 mL output  Changes to drips? none  POD 1 ONLY: Pacing Wires Removed: []Yes           [] NO        [] Platelets < 20,630        [] Arrhythmia        [] Bradycardia        [x] Valve Replacement         [] Pacing for Cardiac Index    LDA Insertion Date Date Changed (if needed) Discontinued Date   Art line   4/8/22   Central Line   4/8/22   ET Tube      Panchal   4/8/22   Chest Tube   4/8/22   Leg Drain      Pacing Wires        Surgery, return to OR no     Hospital Course:  POD# 1    POD #2    CT, A-line, IJ, Panchal, removed without complications    POD #2 - Night Shift    POD #3 - Night Shift -   Controlled A-Fib. Vital signs stable. Slightly hypotensive while asleep. Output from site where chest tube was removed has decreased. Dressing from day shift remained dry and intact throughout duration of night shift. Urine output = 1500 mL. Pain well controlled with tramadol overnight. No other significant changes. POD #4 - day shift- pt stable Afib rate controlled. Ambulating to BR well. Minimal pain controlled. Urine output- 950mL. Chest tube Dressing dry and intact, changed 1800. VSS on RA. POD #4 Night- pt stable Afib rate controlled. Ambulating to bathroom well. Minimal pain controlled. Urine output- 2050mL. Chest tube Dressing dry and intact, changed 0000. VSS on RA. POD #5 Night  PT in stable Afib through night and used O2 PRN throughout shift    POD # 6, 7 am to 3 pm  Sodium level remains low, added levothyroxine for elevated TSH, increased rate of Sodium Chloride 50 meq in 500 ml to 95 ml/hr, had echo today, PRN oxygen 2 L with exertion. POD#6 night 7p-7am  Pt stable through the night. Constant pain at chest tube removal site. Pt would like to take a shower. Dressing change done  POD#7 night  Pt remained stable through the night. Changes in O2 requirements   RA during day, desat to 85% when sleeping   Oxygen Therapy  SpO2: 90 %  Pulse Oximeter Device Mode: Intermittent  Pulse Oximeter Device Location: Finger  O2 Device: None (Room air)  O2 Flow Rate (L/min): 2 L/min     Most recent vitals BP (!) 140/76   Pulse 96   Temp 98.5 °F (36.9 °C) (Oral)   Resp 18   Ht 5' (1.524 m)   Wt 106 lb 12.8 oz (48.4 kg)   SpO2 90%   BMI 20.86 kg/m²       Admission weight Weight: 107 lb (48.5 kg)     Today's weight   Wt Readings from Last 1 Encounters:   04/14/22 106 lb 12.8 oz (48.4 kg)         Lab Data:  CBC:   Recent Labs     04/14/22  0440   WBC 9.2   HGB 11.2*   HCT 33.4*   MCV 94.4        BMP:    Recent Labs     04/14/22  0440   *   K 4.7   CO2 28   BUN 18   CREATININE <0.5*     LIVR:   No results for input(s): AST, ALT in the last 72 hours. PT/INR:   No results for input(s): PROT, INR in the last 72 hours. APTT: No results for input(s): APTT in the last 72 hours.     Drip rates at handoff:   none      Electronically signed by Zari Dimas RN  on 4/10/22 at 4:00 PM EDT

## 2022-04-14 NOTE — CARE COORDINATION
Spoke with RN who states patient will not discharge today due to her labs/sodium levels. Sodium is trending up so will revisit tomorrow regarding discharge. Plan is for patient to discharge to her nephew's house with home care through [place] [id number]. Patient will likely need home oxygen and Loren with [facility] is following in the event that she does      1504 addendum: Spoke with MD who states he IS going to discharge today due to sodium is trending up and he is not worried about it. CASE MANAGEMENT DISCHARGE SUMMARY      Discharge to: home with [facility]  Transportation: private     Confirmed discharge plan with: patient/nephew/RN/Bernadette with [facility]      Patient: yes     Family:  yes    Name: Contact number: Leo Ny [phone]     Facility/Agency, name:  [facility] faxed   Phone number for report to facility: [phone]     RN, name: Lissa Sanchez     Note: Discharging nurse to complete [facility], reconcile [facility], and place final copy with patient's discharge packet.

## 2022-04-14 NOTE — DISCHARGE SUMMARY
Cardiac, Vascular & Thoracic Surgery  Discharge Summary    Patient:  Nereida Courser 1955 [id number]   Admission Date:  3/30/2022  3:50 PM  Discharge Date:  4/14/22 MM    Principle Diagnosis:  Atrial fibrillation with RVR ([address]    Secondary Diagnosis:  Principal Problem:    Atrial fibrillation with RVR ([address]  Active Problems:    New onset atrial fibrillation (HCC)    LAFB (left anterior fascicular block)    Cardiomegaly    Pansystolic murmur  Resolved Problems:    Decompensated heart failure (HCC)    Pleural effusion, bilateral    Mitral valve insufficiency    Cardiac Cath: 4/1/22 with normal coronary arteries per report    Echo: 3/31/22 with Dr. Stevie Ruth   Summary:    Irregular heart rate throughout study. Normal left ventricle systolic function with an estimated ejection fraction of 55%. No regional wall motion abnormalities are seen. Diastolic dysfunction grade and filling pressure are indeterminate due to arrhythmia. The right ventricle is normal in size and function. Gigantic left atrium. The right atrium is mildly dilated. Severely thickened mitral valve leaflets. The posterior mitral valve leaflet appears flail with likely ruptured chordae, resulting in severe eccentric, anteriorly-directed mitral regurgitation. Degree of left atrial enlargement suggests chronic MR, Cannot however, rule out super-imposed vegetation/endocarditis. Clinical correlation advised. Mild aortic valve regurgitation. Mild to moderate tricuspid valve regurgitation. Systolic pulmonary artery pressure (sPAP) is normal and estimated at 30 mmHg (right atrial pressure 8 mmHg)   There is a small right pleural effusion.      Procedure: 4/6 MINIMAL ACCESS MITRAL VALVE REPAIR USING HUTCHINSON 38MM PHYSIO II ANNULOPLASTY RING, RIGHT SIDED MAZE, LEFT ATERIAL APPENDAGE CLIP PLACEMENT, CRYO NERVE ABLATION, ON PUMP, WITH BILATERAL PECTORALIS BLOCKS, XAVIER, AND TEMPORARY PACING WIRE PLACEMENT     History:  The patient is a 75 y.o. female with no significant past medical history who presented to [facility] ED on 3/30 with complaints of several months of fatigue, generalized weakness, shortness of breath, palpitations, and lower extremity swelling. She initially presented to a PCP on 3/30 to establish care (had not seen PCP in over 20 years). She was found to be in afib with RVR and directed to come to the ED. EKG in ED confirmed afib with RVR, rate in the 140s. CBC and BMP unremarkable. BNP 3,634. Troponin negative. CXR with bilateral pleural effusions. Cardizem gtt was initiated and the patient was admitted for further evaluation and treatment. CT chest PE protocol 3/30 showed no evidence of PE but mild pulmonary edema and severe left atrial enlargement. TTE 3/31 showed severe mitral regurgitation, flail posterior mitral valve leaflet. LHC 4/1 with normal coronaries. XAVIER 4/1 confirmed severe MR with flail posterior mitral valve leaflet. We have been consulted for consideration of surgical intervention. Hospital Course: The post operative period for this patient was uneventful. The patient was discharged on 4/14/22. She will follow up with Shakira Horton on 4/19/22. Discharged Condition: stable    Disposition:  Home with [facility]    Medications:  Factor Xa Inhibitors (Xarelto/Elliquis):start  ACE/ARB:not indicated   Betablocker:start  Statin:start    Discharge Medications:     Medication List      START taking these medications    atorvastatin 40 MG tablet  Commonly known as: LIPITOR  Take 1 tablet by mouth nightly     digoxin 125 MCG tablet  Commonly known as: LANOXIN  Take 1 tablet by mouth daily  Start taking on: April 15, 2022     famotidine 20 MG tablet  Commonly known as: PEPCID  Take 1 tablet by mouth 2 times daily     furosemide 20 MG tablet  Commonly known as: LASIX  Take 1 tablet by mouth daily  Start taking on: April 15, 2022     gabapentin 100 MG capsule  Commonly known as: NEURONTIN  Take 1 capsule by mouth 3 times daily for 30 days. levothyroxine 25 MCG tablet  Commonly known as: SYNTHROID  Take 1 tablet by mouth Daily  Start taking on: April 15, 2022     magnesium oxide 400 (240 Mg) MG tablet  Commonly known as: MAG-OX  Take 1 tablet by mouth 2 times daily     metoprolol tartrate 25 MG tablet  Commonly known as: LOPRESSOR  Take 1 tablet by mouth 2 times daily     oxyCODONE 5 MG immediate release tablet  Commonly known as: ROXICODONE  Take 1 tablet by mouth every 6 hours as needed for Pain for up to 3 days. rivaroxaban 20 MG Tabs tablet  Commonly known as: XARELTO  Take 1 tablet by mouth daily        STOP taking these medications    aspirin 300 MG suppository           Where to Get Your Medications      You can get these medications from any pharmacy    Bring a paper prescription for each of these medications  · atorvastatin 40 MG tablet  · digoxin 125 MCG tablet  · famotidine 20 MG tablet  · furosemide 20 MG tablet  · gabapentin 100 MG capsule  · levothyroxine 25 MCG tablet  · magnesium oxide 400 (240 Mg) MG tablet  · metoprolol tartrate 25 MG tablet  · oxyCODONE 5 MG immediate release tablet  · rivaroxaban 20 MG Tabs tablet          Patient Instructions: Activity: DO NOT LIFT, PUSH, OR PULL ANYTHING OVER 5 POUNDS FOR 6 WEEK from the day of surgery  Diet:  cardiac diet  Wound Care:  [facility] YOUR INCISIONS DAILY WITH A CLEAN WASHCLOTH AND ANTIBACTERIAL SOAP.  Do not wash your incisions after you have cleansed other parts of your body    Follow up with Cardiothoracic PALucy on 4/19/22 at 09:10 am.    Follow up with Cardiologist, Dr. Mortimer Mustard on 5/17/22 at 1:30 pm.    CHARLES Sprague - CNP

## 2022-04-15 ENCOUNTER — FOLLOWUP TELEPHONE ENCOUNTER (OUTPATIENT)
Dept: TELEMETRY | Age: 67
End: 2022-04-15

## 2022-04-15 NOTE — TELEPHONE ENCOUNTER
1st Attempt; No Answer- Left HIPAA compliant voicemail with Non-Urgent Heart Failure Resource Line number for call back.         Taj Renner RN

## 2022-04-15 NOTE — TELEPHONE ENCOUNTER
3rd and final Attempt; No Answer- Left HIPAA compliant voicemail with Non-Urgent Heart Failure Resource Line number for call back.       Wild White RN

## 2022-04-15 NOTE — TELEPHONE ENCOUNTER
2nd Attempt; No Answer- Left HIPAA compliant voicemail with Non-Urgent Heart Failure Resource Line number for call back.        Delmi Flores RN

## 2022-04-19 LAB — CORTISOL FREE, SERUM: 1.33 UG/DL

## 2022-04-22 DIAGNOSIS — Z98.890 S/P MVR (MITRAL VALVE REPAIR): Primary | ICD-10-CM

## 2022-04-22 RX ORDER — TRAMADOL HYDROCHLORIDE 50 MG/1
50 TABLET ORAL EVERY 8 HOURS PRN
Qty: 21 TABLET | Refills: 0 | Status: SHIPPED | OUTPATIENT
Start: 2022-04-22 | End: 2022-04-29

## 2022-04-22 NOTE — PROGRESS NOTES
Phoned in script for tramadol 50 mg q8h prn for pain as patient is continuing to have same post op pain. No acute issues.

## 2022-04-26 ENCOUNTER — OFFICE VISIT (OUTPATIENT)
Dept: CARDIOTHORACIC SURGERY | Age: 67
End: 2022-04-26

## 2022-04-26 VITALS
OXYGEN SATURATION: 95 % | WEIGHT: 98.8 LBS | HEIGHT: 60 IN | HEART RATE: 83 BPM | TEMPERATURE: 98 F | BODY MASS INDEX: 19.39 KG/M2 | DIASTOLIC BLOOD PRESSURE: 66 MMHG | SYSTOLIC BLOOD PRESSURE: 114 MMHG

## 2022-04-26 DIAGNOSIS — Z98.890 S/P MVR (MITRAL VALVE REPAIR): Primary | ICD-10-CM

## 2022-04-26 PROCEDURE — 99024 POSTOP FOLLOW-UP VISIT: CPT

## 2022-04-26 RX ORDER — OXYCODONE HYDROCHLORIDE 5 MG/1
5 TABLET ORAL 4 TIMES DAILY
COMMUNITY
Start: 2022-04-14 | End: 2022-05-05

## 2022-04-26 RX ORDER — SERTRALINE HYDROCHLORIDE 25 MG/1
25 TABLET, FILM COATED ORAL DAILY
COMMUNITY
Start: 2022-04-20 | End: 2022-05-05 | Stop reason: ALTCHOICE

## 2022-04-26 NOTE — PROGRESS NOTES
Cardiac, Vascular and Thoracic Surgeons  Clinic Note     4/26/2022 1:07 PM  Surgeon:  Cassia Nugent     S/P :  1st post-op s/p minimal access MVr w/ ring, right-sided MAZE, ISAAC clip, cryo, & XAVIER on 04/06/22 w/ MOM. Chief complaint : 1st post-op  Subjective:  Ms. Dipti Tellez is doing well post-op. Endorses fatigue. She denies pain today. She is having slight SOB, occurs randomly, but has noticed it when she lies down. Right side incision and previous chest tube site healing nicely, no redness or drainage. Right groin site also healing nicely. But still somewhat swollen. Patient denies tenderness. Vital Signs: /66 (Site: Right Upper Arm, Position: Sitting)   Pulse 83   Temp 98 °F (36.7 °C) (Temporal)   Ht 5' (1.524 m)   Wt 98 lb 12.8 oz (44.8 kg)   SpO2 95%   BMI 19.30 kg/m²      I/O:  No intake or output data in the 24 hours ending 04/26/22 1307    Exam:   Cardiovascular: Clear S1, S2. No MRG. Pulmonary: CTAB    Lower extremity: No peripheral edema. Incision: R thoracic incision CDI. Groin incision healed nicely. Small hematoma superior to incision. Labs:   CBC: No results for input(s): WBC, HGB, HCT, MCV, PLT in the last 72 hours. BMP: No results for input(s): NA, K, CL, CO2, PHOS, BUN, CREATININE, CA in the last 72 hours. PT/INR: No results for input(s): PROTIME, INR in the last 72 hours. APTT: No results for input(s): APTT in the last 72 hours. Scheduled Meds:     Patient Active Problem List   Diagnosis    Atrial fibrillation with RVR (HCC)    New onset atrial fibrillation (HCC)    LAFB (left anterior fascicular block)    Cardiomegaly    Pansystolic murmur       Assessment/Plan:   1. 1st post-op s/p minimal access MVr w/ ring, right-sided MAZE, ISAAC clip, cryo, & XAVIER on 04/06/22 w/ MOM  - Patient is doing well, progressing appropriately post-operatively; pain well controlled, incisions healing nicely  - Discussed fatigue potentially from gabapentin.  H&H WNL upon d/c from hospital. Decrease to BID.   - Progress activity as chirta  - Current medications reviewed with patient  - D/c Lasix, mag, potassium   - F/u with PCP and cardiology as prescribed  - F/u with me in 3 weeks, or sooner if needed    PRAVIN RosaC

## 2022-05-05 ENCOUNTER — HOSPITAL ENCOUNTER (OUTPATIENT)
Dept: NURSING | Age: 67
Setting detail: INFUSION SERIES
Discharge: HOME | End: 2022-05-05
Payer: MEDICARE

## 2022-05-05 VITALS
HEART RATE: 77 BPM | TEMPERATURE: 98.6 F | SYSTOLIC BLOOD PRESSURE: 123 MMHG | BODY MASS INDEX: 17.47 KG/M2 | HEIGHT: 60 IN | RESPIRATION RATE: 18 BRPM | OXYGEN SATURATION: 97 % | DIASTOLIC BLOOD PRESSURE: 68 MMHG | WEIGHT: 89 LBS

## 2022-05-05 LAB
CORTISOL 30 MIN: 33.2 UG/DL
CORTISOL 60 MIN: 33.6 UG/DL
CORTISOL BASE: 26 UG/DL

## 2022-05-05 PROCEDURE — 96374 THER/PROPH/DIAG INJ IV PUSH: CPT

## 2022-05-05 PROCEDURE — 99211 OFF/OP EST MAY X REQ PHY/QHP: CPT

## 2022-05-05 PROCEDURE — 6360000002 HC RX W HCPCS: Performed by: INTERNAL MEDICINE

## 2022-05-05 PROCEDURE — 80400 ACTH STIMULATION PANEL: CPT

## 2022-05-05 RX ORDER — SENNOSIDES 8.6 MG
650 CAPSULE ORAL EVERY 8 HOURS PRN
COMMUNITY

## 2022-05-05 RX ORDER — DIPHENHYDRAMINE HCL 25 MG
25 CAPSULE ORAL EVERY 6 HOURS PRN
COMMUNITY
End: 2022-05-24

## 2022-05-05 RX ORDER — COSYNTROPIN 0.25 MG/ML
250 INJECTION, POWDER, FOR SOLUTION INTRAMUSCULAR; INTRAVENOUS ONCE
Status: COMPLETED | OUTPATIENT
Start: 2022-05-05 | End: 2022-05-05

## 2022-05-05 RX ADMIN — COSYNTROPIN 250 MCG: 0.25 INJECTION, POWDER, LYOPHILIZED, FOR SOLUTION INTRAMUSCULAR; INTRAVENOUS at 07:48

## 2022-05-05 ASSESSMENT — PAIN - FUNCTIONAL ASSESSMENT: PAIN_FUNCTIONAL_ASSESSMENT: 0-10

## 2022-05-05 NOTE — PROGRESS NOTES
Pt stimulation test complete. Education given and avs signed. Pt IV hub d/c.  And discharged in stable condition

## 2022-05-16 NOTE — PROGRESS NOTES
CARDIOLOGY FOLLOW UP        Patient Name: Maryann Meier  Primary Care physician: Carisa Malloy MD    Reason for Referral/Chief Complaint: Maryann Meier is a 75 y.o. patient who is referred to cardiology clinic today for hospital follow up newly dx AFIB, CHF. History of Present Illness:   Maryann Meier 75 y.o. with a prior medical history notable for congestive heart failure, cardiomyopathy, mitral regurgitation status post mitral valve repair and atrial fibrillation. She presents today for follow up. Admitted 3/31/2022 with AF with rapid ventricular response. She was found to be in new onset CHF. Echo on 03/31/2022 EF 55%. Severely thickened mitral valve leaflets. The posterior mitral valve leaflet was flail with likely ruptured chordae, resulting in severe eccentric, anteriorly-directed mitral regurgitation. Cardiac Cath Normal coronaries. XAVIER confirmed Severe eccentric MR due to flail PMVL. 4/06/2022 she underwent minimal access MV repair w/ ring, right-sided MAZE, ISAAC clip, cryo. Today, she returns to the office for follow up. She reports fatigue and gets winded easily with exertion. Reports saw CT surgery recently and stopped lasix, MG, and K. Weights back up to 94 lbs. Drinking boost to supplement. She has had Chest pain on a couple occasions since coming home from hospital at site of Incision below right breast. Healing well. Notes Incision right groin area patient reports hard lump, no open area/drainage/fevers/redness. The patient denies palpitations, dizziness, near-syncope or bharati syncope. Denies paroxysmal nocturnal dyspnea, orthopnea, bendopnea, increasing lower extremity edema or weight gain. She denies any evidence of hematemesis, hemoptysis, melena, hematochezia or hematuria with blood thinner. Home Medications:  Were reviewed and are listed in nursing record and/or below  Prior to Admission medications    Medication Sig Start Date End Date Taking?  Authorizing Provider diphenhydrAMINE (BENADRYL) 25 MG capsule Take 25 mg by mouth every 6 hours as needed for Itching   Yes Historical Provider, MD   acetaminophen (TYLENOL 8 HOUR) 650 MG extended release tablet Take 650 mg by mouth every 8 hours as needed for Pain   Yes Historical Provider, MD   rivaroxaban (XARELTO) 20 MG TABS tablet Take 1 tablet by mouth daily 4/14/22  Yes Brittnee Boyle MD   gabapentin (NEURONTIN) 100 MG capsule Take 1 capsule by mouth 3 times daily for 30 days. 4/14/22 5/17/22 Yes Brittnee Boyle MD   atorvastatin (LIPITOR) 40 MG tablet Take 1 tablet by mouth nightly 4/14/22  Yes Brittnee Boyle MD   metoprolol tartrate (LOPRESSOR) 25 MG tablet Take 1 tablet by mouth 2 times daily 4/14/22  Yes Brittnee Boyle MD   digoxin (LANOXIN) 125 MCG tablet Take 1 tablet by mouth daily 4/15/22  Yes Brittnee Boyle MD   levothyroxine (SYNTHROID) 25 MCG tablet Take 1 tablet by mouth Daily 4/15/22  Yes Brittnee Boyle MD   famotidine (PEPCID) 20 MG tablet Take 1 tablet by mouth 2 times daily 4/14/22  Yes Brittnee Bolye MD        CURRENT Medications:  No current facility-administered medications for this visit. Allergies:  Dairycare [lactase-lactobacillus], Eggs or egg-derived products, and Other     Review of Systems:   A 14 point review of symptoms completed. Pertinent positives identified in the HPI, all other review of symptoms negative as below. Objective:     Vitals:    05/17/22 1339   BP: 132/66   Pulse: 76   SpO2: 98%    Weight: 94 lb 8 oz (42.9 kg)       PHYSICAL EXAM:    General:  Thin build, NAD   Head:  Normocephalic, atraumatic   Eyes:  Conjunctiva/corneas clear, anicteric sclerae    Nose: Nares normal, no drainage or sinus tenderness   Throat: No abnormalities of the lips, oral mucosa or tongue. Neck: Trachea midline.  Neck supple with no lymphadenopathy, thyroid not enlarged, symmetric, no tenderness/mass/nodules, no Jugular venous pressure elevation    Lungs:   Clear to auscultation bilaterally, no wheezes, no rales, no respiratory distress breath sounds a little diminished. Chest Wall:  R chest wall incision well-healed. Heart:  Irregularly irregular, variable S1, normal S2, II/VI BAY at apex without thrill, no rub, no S3/S4, PMI non-displaced. Abdomen:   Soft, non-tender, with normoactive bowel sounds. No masses, no hepatosplenomegaly   Extremities: No cyanosis, clubbing or pitting edema. Vascular: 2+ radial, dorsalis pedis and posterior tibial pulses bilaterally. Brisk carotid upstrokes without carotid bruit. A femoral bruit is present over right groin, R going incision well healed   Skin: Skin color, texture, turgor are normal with no rashes or ulceration. Pysch: Euthymic mood, appropriate affect   Neurologic: Oriented to person, place and time. No slurred speech or facial asymmetry. No motor or sensory deficits on gross examination. Labs:   CBC:   Lab Results   Component Value Date    WBC 9.2 04/14/2022    RBC 3.54 04/14/2022    HGB 11.2 04/14/2022    HCT 33.4 04/14/2022    MCV 94.4 04/14/2022    RDW 13.2 04/14/2022     04/14/2022     CMP:  Lab Results   Component Value Date     04/14/2022    K 4.7 04/14/2022    K 3.9 03/31/2022    CL 95 04/14/2022    CO2 28 04/14/2022    BUN 18 04/14/2022    CREATININE <0.5 04/14/2022    GFRAA >60 04/14/2022    AGRATIO 1.5 03/31/2022    LABGLOM >60 04/14/2022    GLUCOSE 120 04/14/2022    PROT 7.3 04/06/2022    CALCIUM 8.7 04/14/2022    BILITOT 0.7 04/06/2022    ALKPHOS 81 04/06/2022    AST 27 04/06/2022    ALT 22 04/06/2022     PT/INR:  No results found for: PTINR  HgBA1c:  Lab Results   Component Value Date    LABA1C 5.6 04/06/2022     Lab Results   Component Value Date    TROPONINI 0.01 04/05/2022         Cardiac Data:   Mitiek     S/P :  1st post-op s/p minimal access MVr w/ ring, right-sided MAZE, ISAAC clip, cryo, & XAVIER on 04/06/22 w/ MOM.        Echo: 04/12/2022  Summary  Limited echo for LV function/post op eval with limited doppler/color. Global left ventricular function is mildly decreased with ejection fraction estimated at 45%. EF by Guzman's method estimated at 45%. Mild concentric left ventricular hypertrophy. Hypokinesis of the anteroseptal and inferoseptal wall segments. Diastolic function not assessed due to mitral prosthesis. The right ventricle is normal in size with reduced function. The left atrium is severely dilated. The right atrium is moderately dilated. The anterior mitral valve leaflet appears thickened without reduced mobility. Normally functioning \"HUTCHINSON 38MM PHYSIO II ANNULOPLASTY RING\" in the mitral position with a max velocity of 1.27 m/sec,maximum gradient of 7 mmHg and a mean gradient of 2 mmHg. Mild to moderate mitral regurgitation. Mild to moderate tricuspid valve regurgitation. Systolic pulmonary artery pressure (SPAP) estimated at 42 mmHg (RA pressure 8 mmHg), consistent withmild pulmonary  hypertension. There is a moderate localized near left ventricle, remaining appears small pericardial effusion noted. [id number]  Atrial fibrillationRightward axis Abnormal ECG When compared with ECG of 2022 09:31,ST no longer depressed in Lateral leads      Carotid duplex: 22  Summary        The bilateral proximal internal carotid artery reveals a <50% diameter    reducing stenosis. The bilateral vertebral arteries demonstrate normal antegrade flow       [facility]:  22  Findings:  1. Hemodynamics:  A. Right heart catheterization                   1. RA:  6 mmHg                   2. RV:  34/5/7 mmHG                   3. PA:  37/18/27 mmHG                   4. PCWP: 22 mmHg                   5. Ignacio CO: 3.9 L/min                   6. Ignacio CI: 2.75  L/min*m2                   7. Ignacio SVR: 1571 d/s                   8. Transpulmonary gradient: 5       9.  Ignacio PVR: 1 wood unit              B. Opening arterial pressure: 103/67/82              C. LVEDP: 15 mmhg  PA sat 71%  PW sat 95%  RA sat 70%  Ao sat 97% (on NRB at time of draw). 2.  Coronary anatomy:  A. Left main artery: The left main artery bifurcates into the left anterior descending artery and left circumflex artery. The left main artery was normal.   B. Left anterior descending artery: Transapical vessel which gives rise to 2 diagonal arteries. The left anterior descending artery had no disease. Diminutive vessel distally. The diagonal arteries had no disease. C. Left circumflex artery: Non-dominant vessel that gives rise to 2 obtuse marginal arteries. The left circumflex artery had no disease. The first obtuse marginal artery had no diease. The second marginal artery was tortuous with no disease. D. Right coronary artery: Large Dominant vessel that gives rise to the posterior descending artery and posterolateral branch. The right coronary artery had no disease. The posterior descending artery had no disease. The posterolateral branch had no disease. XAVIER: 4/1/22  Summary   The left ventricle appears mildly dilated with preserved systolic function. Ejection fraction is visually estimated at 55%. Normal right ventricular size and function. Gigantic left atrium. No left atrial/left atrial appendage thrombus. Flail posterior mitral valve leaflet with ruptured chordae seen prolapsing   into Left atrium. Severe anterior-directed, eccentric mitral regurgitation. Trace aortic regurgitation. Mild tricuspid regurgitation. Mild pulmonic regurgitation present. A bubble study was performed and showed delayed evidence of right to left   shunt consistent with a small intrapulmonary shunt. Echo:3/31/2022  Summary   Irregular heart rate throughout study. Normal left ventricle systolic function with an estimated ejection fraction of 55%. No regional wall motion abnormalities are seen. Diastolic dysfunction grade and filling pressure are indeterminate due to arrhythmia.    The right ventricle is normal in size and function. Gigantic left atrium. The right atrium is mildly dilated. Severely thickened mitral valve leaflets. The posterior mitral valve leaflet appears flail with likely ruptured chordae, resulting in severe eccentric, anteriorly-directed mitral regurgitation. Degree of left atrial enlargement suggests chronic MR, Cannot however, rule out super-imposed vegetation/endocarditis. Clinical correlation advised. Mild aortic valve regurgitation. Mild to moderate tricuspid valve regurgitation. Systolic pulmonary artery pressure (sPAP) is normal and estimated at 30 mmHg (right atrial pressure 8 mmHg)   There is a small right pleural effusion. No prior studies available for comparison. Impression and Plan:      Atrial fibrillation, persistent  Mitral valve regurgitation/Flail PMVL status post repair, mild-moderate residual MR by echo  Non-ischemic cardiomyopathy with mild LV dysfunction 2/2 valvular heart disease  Congestive heart failure mid-range EF, compensated  Malnutrition, BMI 18.5    Patient Active Problem List   Diagnosis    Atrial fibrillation with RVR ([address]    New onset atrial fibrillation (HCC)    LAFB (left anterior fascicular block)    Cardiomegaly    Pansystolic murmur       PLAN:  1. Referral to cardiac rehab placed  2. Will reach out to CT surgery, consider US doppler for femoral bruit findings to ensure no fistula formation  3. Lab work BMP and digoxin level and lipids  4. We discussed and will Schedule  Cardioversion for AF - trial of sinus. 5. Continue xarelto un-interrupted   6. Plan to change metoprolol to XL formulation following DCCV    Follow up with me post DCCV      This note is scribed in the presence of Devorah Ramírez by Johnathan Das RN      The scribes documentation has been prepared under my direction and personally reviewed by me in its entirety.   I confirm that the note above accurately reflects all work, treatment, procedures, and medical decision making performed by me. Ramiro Jo MD, personally performed the services described in this documentation as scribed by  Kehinde Reaves RN in my presence, and it is both accurate and complete to the best of our ability. I will address the patient's cardiac risk factors and adjusted pharmacologic treatment as needed. In addition, I have reinforced the need for patient directed risk factor modification. All questions and concerns were addressed to the patient/family. Alternatives to my treatment were discussed. Thank you for allowing us to participate in the care of Nelly Echeverria. Please call me with any questions [phone].     Sia Benson MD, [facility] [place]  Cardiovascular Disease  [facility]  [phone] [address] [facility]  [phone] [address]  5/17/2022 1:53 PM

## 2022-05-17 ENCOUNTER — OFFICE VISIT (OUTPATIENT)
Dept: CARDIOLOGY CLINIC | Age: 67
End: 2022-05-17
Payer: MEDICARE

## 2022-05-17 ENCOUNTER — TELEPHONE (OUTPATIENT)
Dept: CARDIOLOGY CLINIC | Age: 67
End: 2022-05-17

## 2022-05-17 VITALS
HEIGHT: 60 IN | BODY MASS INDEX: 18.55 KG/M2 | HEART RATE: 76 BPM | DIASTOLIC BLOOD PRESSURE: 66 MMHG | WEIGHT: 94.5 LBS | OXYGEN SATURATION: 98 % | SYSTOLIC BLOOD PRESSURE: 132 MMHG

## 2022-05-17 DIAGNOSIS — I48.91 ATRIAL FIBRILLATION WITH RVR (HCC): ICD-10-CM

## 2022-05-17 DIAGNOSIS — R01.1 PANSYSTOLIC MURMUR: ICD-10-CM

## 2022-05-17 DIAGNOSIS — Z98.890 STATUS POST HEART VALVE REPAIR: Primary | ICD-10-CM

## 2022-05-17 DIAGNOSIS — I51.7 CARDIOMEGALY: ICD-10-CM

## 2022-05-17 DIAGNOSIS — I44.4 LAFB (LEFT ANTERIOR FASCICULAR BLOCK): ICD-10-CM

## 2022-05-17 DIAGNOSIS — I48.91 NEW ONSET ATRIAL FIBRILLATION (HCC): ICD-10-CM

## 2022-05-17 PROCEDURE — 99214 OFFICE O/P EST MOD 30 MIN: CPT | Performed by: INTERNAL MEDICINE

## 2022-05-17 NOTE — PATIENT INSTRUCTIONS
PLAN:  1. Recommend cardiac rehab  2. Dr. Gordon Goss will talk to Sukumar Lopez about possible femoral bruit? 3. Lab work BMP and digoxin level and lipids  4.  Schedule  cardioversion  [place] will call you to schedule     Follow up with me in after testing

## 2022-05-19 NOTE — TELEPHONE ENCOUNTER
Spoke with patient. DEVAN is at [facility] next week and OOT the following week. Patient is checking on a ride for the week of 6/6 and will call back. If I'm unavailable please document which day works for her and I can start the process and call her back with details.

## 2022-05-19 NOTE — TELEPHONE ENCOUNTER
Spoke with patient. Patient is scheduled with Dr. Henry Hernandez for Cardioversion with anesthesia on 6/9/22 at 12pm [facility], arrival time of 10:30am to the Cath Lab. Please have patient arrive to the main entrance of [facility] and check in with the registration desk. Please call patient regarding medication instructions. Remind patient to be NPO after midnight (8 hours prior). Do not apply lotions/creams on skin the day of procedure. Can she wait to have this lab work done the day of CV? Or sooner? 3.  Lab work BMP and digoxin level and lipids

## 2022-05-20 DIAGNOSIS — R09.89 BRUIT (ARTERIAL): Primary | ICD-10-CM

## 2022-05-23 NOTE — TELEPHONE ENCOUNTER
Attempted to reach pt to let her know per DEVAN, she can wait until procedure to have labs done. Left a vm to call the office back.

## 2022-05-23 NOTE — TELEPHONE ENCOUNTER
She had renal function done by nephrology. Looks good.  May do BMP/Dig/FLP on day of DCCV thanks    DEVAN

## 2022-05-24 ENCOUNTER — PROCEDURE VISIT (OUTPATIENT)
Dept: VASCULAR SURGERY | Age: 67
End: 2022-05-24

## 2022-05-24 ENCOUNTER — OFFICE VISIT (OUTPATIENT)
Dept: CARDIOTHORACIC SURGERY | Age: 67
End: 2022-05-24

## 2022-05-24 VITALS
BODY MASS INDEX: 17.79 KG/M2 | SYSTOLIC BLOOD PRESSURE: 94 MMHG | TEMPERATURE: 98.3 F | OXYGEN SATURATION: 95 % | HEART RATE: 77 BPM | WEIGHT: 90.6 LBS | HEIGHT: 60 IN | DIASTOLIC BLOOD PRESSURE: 52 MMHG

## 2022-05-24 DIAGNOSIS — R09.89 BRUIT (ARTERIAL): ICD-10-CM

## 2022-05-24 DIAGNOSIS — Z98.890 S/P MVR (MITRAL VALVE REPAIR): Primary | ICD-10-CM

## 2022-05-24 PROCEDURE — 99024 POSTOP FOLLOW-UP VISIT: CPT

## 2022-05-24 RX ORDER — LORATADINE 10 MG/1
10 TABLET ORAL DAILY
COMMUNITY
End: 2022-07-11

## 2022-05-24 RX ORDER — FUROSEMIDE 20 MG/1
20 TABLET ORAL
COMMUNITY
Start: 2022-05-13 | End: 2022-07-11

## 2022-05-24 NOTE — PROGRESS NOTES
Cardiac, Vascular and Thoracic Surgeons  Clinic Note     5/24/2022 10:21 AM  Surgeon:  Lena Dozier     S/P :  Minimal access MVr w/ring, right-sided MAZE, ISAAC clip, Cryo & XAVIER 4/6/22    Chief complaint :2nd post op  Subjective:  Ms. Rebecca Patel is doing well since last office visit. She does report feeling fatigued and weakness. She followed up with Cardiology-Dr. Pinky Galvez on 5/17/22 and is scheduled for a cardioversion 6/9/22. Vital Signs: BP (!) 94/52 (Site: Left Upper Arm, Position: Sitting)   Pulse 77   Temp 98.3 °F (36.8 °C) (Infrared)   Ht 5' (1.524 m)   Wt 90 lb 9.6 oz (41.1 kg)   SpO2 95%   BMI 17.69 kg/m²      I/O:  No intake or output data in the 24 hours ending 05/24/22 1021    Exam:   Cardiovascular: Clear S1, S2. Irregular rate & rhythm. Known AF. Pulmonary: CTAB    Lower extremity: No edema  Incision: R VATS and CT site are healing without erythema or purulence. R groin incision is healed with swelling noted. No bruit. R arterial study today after OV. Labs:   CBC: No results for input(s): WBC, HGB, HCT, MCV, PLT in the last 72 hours. BMP: No results for input(s): NA, K, CL, CO2, PHOS, BUN, CREATININE, CA in the last 72 hours. PT/INR: No results for input(s): PROTIME, INR in the last 72 hours. APTT: No results for input(s): APTT in the last 72 hours. Scheduled Meds:     Patient Active Problem List   Diagnosis    Atrial fibrillation with RVR (HCC)    New onset atrial fibrillation (HCC)    LAFB (left anterior fascicular block)    Cardiomegaly    Pansystolic murmur       Assessment/Plan:   1.  Minimal access MVr w/ring, right-sided MAZE, ISAAC clip, Cryo & XAVIER 4/6/22  - Patient is doing well, progressing appropriately post-operatively; pain well controlled, incisions healing nicely  - R groin inflammation eval with doppler, no evidence of fistula. + non vascularized hematoma   - Current medications reviewed with patient  - Plan for cardioversion 6/9   - F/u with PCP and cardiology as prescribed  - F/u with me PRN    Justina White PA-C

## 2022-05-26 NOTE — TELEPHONE ENCOUNTER
Tried to contact patient to make sure has instructions for upcoming cardioversion on 06/09/2022. No lasix. Take Xarelto as prescribed and other medications with a sip of water. No lotions or creams on skin the day of the procedure. NPO after midnight. Left message to call us for instructions.

## 2022-06-09 ENCOUNTER — ANESTHESIA (OUTPATIENT)
Dept: CARDIAC CATH/INVASIVE PROCEDURES | Age: 67
End: 2022-06-09
Payer: MEDICARE

## 2022-06-09 ENCOUNTER — HOSPITAL ENCOUNTER (OUTPATIENT)
Dept: CARDIAC CATH/INVASIVE PROCEDURES | Age: 67
Discharge: HOME | End: 2022-06-09
Attending: INTERNAL MEDICINE | Admitting: INTERNAL MEDICINE
Payer: MEDICARE

## 2022-06-09 ENCOUNTER — ANESTHESIA EVENT (OUTPATIENT)
Dept: CARDIAC CATH/INVASIVE PROCEDURES | Age: 67
End: 2022-06-09
Payer: MEDICARE

## 2022-06-09 VITALS — BODY MASS INDEX: 17.64 KG/M2 | WEIGHT: 93.4 LBS | HEIGHT: 61 IN

## 2022-06-09 DIAGNOSIS — I48.91 ATRIAL FIBRILLATION WITH RVR (HCC): Primary | ICD-10-CM

## 2022-06-09 DIAGNOSIS — I48.19 PERSISTENT ATRIAL FIBRILLATION (HCC): ICD-10-CM

## 2022-06-09 LAB
ANION GAP SERPL CALCULATED.3IONS-SCNC: 7 MMOL/L (ref 3–16)
BUN BLDV-MCNC: 11 MG/DL (ref 7–20)
CALCIUM SERPL-MCNC: 9.4 MG/DL (ref 8.3–10.6)
CHLORIDE BLD-SCNC: 99 MMOL/L (ref 99–110)
CO2: 32 MMOL/L (ref 21–32)
CREAT SERPL-MCNC: 0.6 MG/DL (ref 0.6–1.2)
DIGOXIN LEVEL: 0.8 NG/ML (ref 0.8–2)
EKG ATRIAL RATE: 340 BPM
EKG DIAGNOSIS: NORMAL
EKG Q-T INTERVAL: 376 MS
EKG QRS DURATION: 90 MS
EKG QTC CALCULATION (BAZETT): 433 MS
EKG R AXIS: 97 DEGREES
EKG T AXIS: 240 DEGREES
EKG VENTRICULAR RATE: 80 BPM
GFR AFRICAN AMERICAN: >60
GFR NON-AFRICAN AMERICAN: >60
GLUCOSE BLD-MCNC: 93 MG/DL (ref 70–99)
HCT VFR BLD CALC: 37.4 % (ref 36–48)
HEMOGLOBIN: 12.3 G/DL (ref 12–16)
MCH RBC QN AUTO: 30.4 PG (ref 26–34)
MCHC RBC AUTO-ENTMCNC: 33 G/DL (ref 31–36)
MCV RBC AUTO: 92 FL (ref 80–100)
PDW BLD-RTO: 14.7 % (ref 12.4–15.4)
PLATELET # BLD: 376 K/UL (ref 135–450)
PMV BLD AUTO: 6.1 FL (ref 5–10.5)
POTASSIUM SERPL-SCNC: 4.1 MMOL/L (ref 3.5–5.1)
RBC # BLD: 4.07 M/UL (ref 4–5.2)
SODIUM BLD-SCNC: 138 MMOL/L (ref 136–145)
WBC # BLD: 7.8 K/UL (ref 4–11)

## 2022-06-09 PROCEDURE — 92960 CARDIOVERSION ELECTRIC EXT: CPT | Performed by: INTERNAL MEDICINE

## 2022-06-09 PROCEDURE — 93010 ELECTROCARDIOGRAM REPORT: CPT | Performed by: INTERNAL MEDICINE

## 2022-06-09 PROCEDURE — 2500000003 HC RX 250 WO HCPCS: Performed by: NURSE ANESTHETIST, CERTIFIED REGISTERED

## 2022-06-09 PROCEDURE — 6360000002 HC RX W HCPCS: Performed by: NURSE ANESTHETIST, CERTIFIED REGISTERED

## 2022-06-09 PROCEDURE — 93005 ELECTROCARDIOGRAM TRACING: CPT | Performed by: INTERNAL MEDICINE

## 2022-06-09 PROCEDURE — 85027 COMPLETE CBC AUTOMATED: CPT

## 2022-06-09 PROCEDURE — 3700000001 HC ADD 15 MINUTES (ANESTHESIA)

## 2022-06-09 PROCEDURE — 3700000000 HC ANESTHESIA ATTENDED CARE

## 2022-06-09 PROCEDURE — 92960 CARDIOVERSION ELECTRIC EXT: CPT

## 2022-06-09 PROCEDURE — 80162 ASSAY OF DIGOXIN TOTAL: CPT

## 2022-06-09 PROCEDURE — 2580000003 HC RX 258: Performed by: NURSE ANESTHETIST, CERTIFIED REGISTERED

## 2022-06-09 PROCEDURE — 80048 BASIC METABOLIC PNL TOTAL CA: CPT

## 2022-06-09 RX ORDER — SODIUM CHLORIDE 9 MG/ML
1000 INJECTION, SOLUTION INTRAVENOUS CONTINUOUS
Status: DISCONTINUED | OUTPATIENT
Start: 2022-06-09 | End: 2022-06-09 | Stop reason: HOSPADM

## 2022-06-09 RX ORDER — PROPOFOL 10 MG/ML
INJECTION, EMULSION INTRAVENOUS PRN
Status: DISCONTINUED | OUTPATIENT
Start: 2022-06-09 | End: 2022-06-09 | Stop reason: SDUPTHER

## 2022-06-09 RX ORDER — LIDOCAINE HYDROCHLORIDE 20 MG/ML
INJECTION, SOLUTION EPIDURAL; INFILTRATION; INTRACAUDAL; PERINEURAL PRN
Status: DISCONTINUED | OUTPATIENT
Start: 2022-06-09 | End: 2022-06-09 | Stop reason: SDUPTHER

## 2022-06-09 RX ORDER — METOPROLOL SUCCINATE 50 MG/1
50 TABLET, EXTENDED RELEASE ORAL DAILY
Qty: 90 TABLET | Refills: 1 | Status: SHIPPED | OUTPATIENT
Start: 2022-06-10 | End: 2022-07-26 | Stop reason: SDUPTHER

## 2022-06-09 RX ORDER — SODIUM CHLORIDE 9 MG/ML
INJECTION, SOLUTION INTRAVENOUS CONTINUOUS PRN
Status: DISCONTINUED | OUTPATIENT
Start: 2022-06-09 | End: 2022-06-09 | Stop reason: SDUPTHER

## 2022-06-09 RX ADMIN — PROPOFOL 70 MG: 10 INJECTION, EMULSION INTRAVENOUS at 12:08

## 2022-06-09 RX ADMIN — SODIUM CHLORIDE: 9 INJECTION, SOLUTION INTRAVENOUS at 12:04

## 2022-06-09 RX ADMIN — LIDOCAINE HYDROCHLORIDE 40 MG: 20 INJECTION, SOLUTION EPIDURAL; INFILTRATION; INTRACAUDAL; PERINEURAL at 12:08

## 2022-06-09 RX ADMIN — PROPOFOL 20 MG: 10 INJECTION, EMULSION INTRAVENOUS at 12:16

## 2022-06-09 RX ADMIN — PROPOFOL 20 MG: 10 INJECTION, EMULSION INTRAVENOUS at 12:13

## 2022-06-09 NOTE — H&P
Brief Pre-Op Note/Sedation Assessment      Karol Rolon  1955  Cath Pool Rm/NONE      [id number]  11:55 AM    Planned Procedure: DCCV    Post Procedure Plan: Return to same level of care    Consent: I have discussed with the patient and/or the patient representative the indication, alternatives, and the possible risks and/or complications of the planned procedure and the anesthesia methods. The patient and/or patient representative appear to understand and agree to proceed. Chief Complaint: Atrial fibrillation     Karol Rolon 75 y.o. with a prior medical history notable for congestive heart failure, cardiomyopathy, mitral regurgitation status post mitral valve repair and atrial fibrillation. She presents today for follow up.      Admitted 3/31/2022 with AF with rapid ventricular response. She was found to be in new onset CHF. Echo on 03/31/2022 EF 55%. Severely thickened mitral valve leaflets. The posterior mitral valve leaflet was flail with likely ruptured chordae, resulting in severe eccentric, anteriorly-directed mitral regurgitation. Cardiac Cath Normal coronaries.  XAVIER confirmed Severe eccentric MR due to flail PMVL. 4/06/2022 she underwent minimal access MV repair w/ ring, right-sided MAZE, ISAAC clip, cryo. Last evaluated 5/17/22. Noted persistent AF. DCCV planned. Vital Signs:  Ht 5' 1\" (1.549 m)   Wt 93 lb 6.4 oz (42.4 kg)   BMI 17.65 kg/m²     Allergies: Allergies   Allergen Reactions    Eggs Or Egg-Derived Products     Other      Pt states she is not lactose intolerant, she is allergic to dairy.         Past Medical History:  Past Medical History:   Diagnosis Date    CAD (coronary artery disease)     Hyperlipidemia     Thyroid disease     Varicose vein          Surgical History:  Past Surgical History:   Procedure Laterality Date    MITRAL VALVE REPAIR N/A 4/6/2022    MINIMAL ACCESS MITRAL VALVE REPAIR USING HUTCHINSON 38MM PHYSIO II ANNULOPLASTY RING, RIGHT SIDED MAZE, LEFT ATERIAL APPENDAGE CLIP PLACEMENT, CRYO NERVE ABLATION, ON PUMP, WITH BILATERAL PECTORALIS BLOCKS, XAVIER, AND TEMPORARY PACING WIRE PLACEMENT performed by Jerrell Hernandez MD at [facility]           Medications:  Current Facility-Administered Medications   Medication Dose Route Frequency Provider Last Rate Last Admin    0.9 % sodium chloride infusion  1,000 mL IntraVENous Continuous Keturah Neal MD               Pre-Sedation:    Pre-Sedation Documentation and Exam:  I have personally completed a history, physical exam & review of systems for this patient (see notes). Prior History of Anesthesia Complications:   none    Modified Mallampati:  I (soft palate, uvula, fauces, tonsillar pillars visible)    ASA Classification:  Class 2 - A normal healthy patient with mild systemic disease      Daniel Scale: Activity:  2 - Able to move 4 extremities voluntarily on command  Respiration:  2 - Able to breathe deeply and cough freely  Circulation:  2 - BP+/- 20mmHg of normal  Consciousness:  2 - Fully awake  Oxygen Saturation (color):  2 - Able to maintain oxygen saturation >92% on room air    Sedation/Anesthesia Plan:  Guard the patient's safety and welfare. Minimize physical discomfort and pain. Minimize negative psychological responses to treatment by providing sedation and analgesia and maximize the potential amnesia. Patient to meet pre-procedure discharge plan.     Medication Planned:  Per anesthesia     Patient is an appropriate candidate for plan of sedation: yes      Electronically signed by Keturah Neal MD on 6/9/2022 at 11:55 AM

## 2022-06-09 NOTE — ANESTHESIA POSTPROCEDURE EVALUATION
Department of Anesthesiology  Postprocedure Note    Patient: Kaorl Rolon  MRN: [id number]  YOB: 1955  Date of evaluation: 6/9/2022  Time:  4:43 PM     Procedure Summary     Date: 06/09/22 Room / Location: [facility]    Anesthesia Start: 9503 Anesthesia Stop: 1794    Procedure: CARDIOVERSION Diagnosis:       Unspecified atrial fibrillation      Unspecified atrial fibrillation    Scheduled Providers:  Responsible Provider: Jovanna Santiago MD    Anesthesia Type: MAC ASA Status: 3          Anesthesia Type: No value filed. Daniel Phase I:      Daniel Phase II:      Last vitals: Reviewed and per EMR flowsheets.        Anesthesia Post Evaluation    Comments: Postoperative Anesthesia Note    Name:    Karol Rolon  MRN:      [id number]    Patient Vitals in the past 12 hrs:  06/09/22 1030, Height:5' 1\" (1.549 m), Weight:93 lb 6.4 oz (42.4 kg)     LABS:    CBC  Lab Results       Component                Value               Date/Time                  WBC                      7.8                 06/09/2022 10:50 AM        HGB                      12.3                06/09/2022 10:50 AM        HCT                      37.4                06/09/2022 10:50 AM        PLT                      376                 06/09/2022 10:50 AM   RENAL  Lab Results       Component                Value               Date/Time                  NA                       138                 06/09/2022 10:50 AM        K                        4.1                 06/09/2022 10:50 AM        K                        3.9                 03/31/2022 06:44 AM        CL                       99                  06/09/2022 10:50 AM        CO2                      32                  06/09/2022 10:50 AM        BUN                      11                  06/09/2022 10:50 AM        CREATININE               0.6                 06/09/2022 10:50 AM        GLUCOSE                  93                  06/09/2022 10:50 AM   COAGS  Lab Results       Component                Value               Date/Time                  PROTIME                  13.9 (H)            04/06/2022 06:40 AM        INR                      1.22 (H)            04/06/2022 06:40 AM     Intake & Output: In: 500 (I.V.:500)  Out: -     Nausea & Vomiting:  No    Level of Consciousness:  Awake    Pain Assessment:  Adequate analgesia    Anesthesia Complications:  No apparent anesthetic complications    SUMMARY      Vital signs stable  OK to discharge from Stage I post anesthesia care.   Care transferred from Anesthesiology department on discharge from perioperative area

## 2022-06-09 NOTE — ANESTHESIA PRE PROCEDURE
Department of Anesthesiology  Preprocedure Note       Name:  Danelle Bustamante   Age:  75 y.o.  :  1955                                          MRN:  [id number]         Date:  2022      Surgeon: * No surgeons listed *    Procedure: * No procedures listed *    Medications prior to admission:   Prior to Admission medications    Medication Sig Start Date End Date Taking? Authorizing Provider   metoprolol succinate (TOPROL XL) 50 MG extended release tablet Take 1 tablet by mouth daily 6/10/22 12/7/22 Yes Bill Chew MD   loratadine (CLARITIN) 10 MG tablet Take 10 mg by mouth daily  Patient not taking: Reported on 2022    Historical Provider, MD   furosemide (LASIX) 20 MG tablet 20 mg  Patient not taking: Reported on 2022   Historical Provider, MD   acetaminophen (TYLENOL 8 HOUR) 650 MG extended release tablet Take 650 mg by mouth every 8 hours as needed for Pain    Historical Provider, MD   rivaroxaban (XARELTO) 20 MG TABS tablet Take 1 tablet by mouth daily 22   Ashleigh Hernandez MD   atorvastatin (LIPITOR) 40 MG tablet Take 1 tablet by mouth nightly 22   Ashleigh Hernandez MD   digoxin (LANOXIN) 125 MCG tablet Take 1 tablet by mouth daily 4/15/22   Ashleigh Hernandez MD   levothyroxine (SYNTHROID) 25 MCG tablet Take 1 tablet by mouth Daily 4/15/22   Ashleigh Hernandez MD   famotidine (PEPCID) 20 MG tablet Take 1 tablet by mouth 2 times daily 22   Ashleigh Hernandez MD       Current medications:    Current Facility-Administered Medications   Medication Dose Route Frequency Provider Last Rate Last Admin    0.9 % sodium chloride infusion  1,000 mL IntraVENous Continuous Bill Chew MD           Allergies: Allergies   Allergen Reactions    Eggs Or Egg-Derived Products     Other      Pt states she is not lactose intolerant, she is allergic to dairy.         Problem List:    Patient Active Problem List   Diagnosis Code    Atrial fibrillation with RVR ([facility] [address] I48.91    New onset atrial fibrillation

## 2022-06-09 NOTE — PROCEDURES
6/9/2022    PROCEDURE PERFORMED:     1. Direct current cardioversion. INDICATIONS: Symptomatic Atrial fibrillation. PROCEDURE: The patient was brought to the lab in fasting state. The patient received adequate sedation with the assistance of anesthesia for the case. After ensuring adequate sedation, the esophagus was intubated and a complete transesophageal echocardiogram was completed. There were no complications related to the study. Following clearing the left atrial appendage/left atrium of thrombus, the patient was prepped for cardioversion. A synchronized shock delivering 200 Joules energy was given. The patient failed to successfully converted to NSR. Pad placement adjusted, manual pressure applied, and repeat synchronized 200 J energy shock given with again failure to convert. A third synchronized chock at 200 J was attempted following new Pad placement and manual pressure, and unfortunately, the patient again failed to convert to sinus. At this point, the procedure was aborted. The patient tolerated the procedure well. Post-procedure examination notable for stable vitals with hemodynamic and neurovascular recovery. CONCLUSION: Unuccessful direct current cardioversion. PLAN:   1. Continue systemic anticoagulation without interruption. 2. Continue current cardiac medications with change in metoprolol to XL formulation for GDMT. 3. Will discuss referral to Electrophysiology for consideration for anti-arrhythmic drug therapy and repeat DCCV attempt versus ablation for persistent, symptomatic, AF.   4.  Obtain digoxin level as she takes this in the evenings, will call with results OP. 5.  Will have her start rehab in the interim. 6. Follow up with me 1 month.      Ankita Schmitt MD, 0 [facility]   [phone] [facility] office   [phone] [facility]

## 2022-07-05 NOTE — PROGRESS NOTES
CARDIOLOGY FOLLOW UP        Patient Name: Kera Minaya  Primary Care physician: Cathleen Saleem MD    Reason for Referral/Chief Complaint: Kera Minaya is a 75 y.o. patient who is here to cardiology clinic today for follow up for AFIB, CHF. History of Present Illness:   Kera Minaya 75 y.o. with a prior medical history notable for congestive heart failure, cardiomyopathy, mitral regurgitation status post mitral valve repair and atrial fibrillation. She presents today for follow up. Admitted 3/31/2022 with AF with rapid ventricular response. She was found to be in new onset CHF. Echo on 03/31/2022 EF 55%. Severely thickened mitral valve leaflets. The posterior mitral valve leaflet was flail with likely ruptured chordae, resulting in severe eccentric, anteriorly-directed mitral regurgitation. Cardiac Cath Normal coronaries. XAVIER confirmed Severe eccentric MR due to flail PMVL. 4/06/2022 she underwent minimal access MV repair w/ ring, right-sided MAZE, ISAAC clip, cryo. LOV 05/17/2022, noted dyspnea with exertion with little activity and fatigue. Atypical chest pain located beneath her right breast.  She noted a lump in her right groin that did not appear infected. Noted femoral bruit. Planned cardioversion for atrial fibrillation. In the interm 05/24/2022, patient underwent Doppler which showed small right groin hematoma but no evidence of pseudoaneurysm or fistula. 06/09/2022 attempted cardioversion was unsuccessful. Patient was referred to electrophysiology for consideration for anti-arrhythmic drug therapy and repeat DCCV attempt versus ablation for persistent, symptomatic, AF. Today, reports still fatigued and can not eat a lot at a time. Stopped doing boost, says she did not like it. Reports her heart rate at home is in 70/80's by BP cuff. Says still has numbness near groin area and reports knot still in groin that has not resolved. She was told by CT surgery this could take months.   Has random pains in right side of chest/breast area. Similar to previous OV. Nonexertional.  Brief and sharp/stabbing. Treats with Tylenol. Denies palpitations, dizziness, near-syncope or bharati syncope. Still fatigued. Denies paroxysmal nocturnal dyspnea, orthopnea, bendopnea, increasing lower extremity edema or weight gain. The patient is compliant with medications. Cost of medications is affordable. No endorsed side effects. Home Medications:  Were reviewed and are listed in nursing record and/or below  Prior to Admission medications    Medication Sig Start Date End Date Taking? Authorizing Provider   diphenhydrAMINE HCl (BENADRYL ALLERGY PO) Take by mouth   Yes Historical Provider, MD   metoprolol succinate (TOPROL XL) 50 MG extended release tablet Take 1 tablet by mouth daily 6/10/22 12/7/22 Yes Monica Valadez MD   acetaminophen (TYLENOL 8 HOUR) 650 MG extended release tablet Take 650 mg by mouth every 8 hours as needed for Pain   Yes Historical Provider, MD   rivaroxaban (XARELTO) 20 MG TABS tablet Take 1 tablet by mouth daily 4/14/22  Yes Dante Rios MD   atorvastatin (LIPITOR) 40 MG tablet Take 1 tablet by mouth nightly 4/14/22  Yes Dante Rios MD   digoxin (LANOXIN) 125 MCG tablet Take 1 tablet by mouth daily 4/15/22  Yes Dante Rois MD   levothyroxine (SYNTHROID) 25 MCG tablet Take 1 tablet by mouth Daily 4/15/22  Yes Dante Rios MD   famotidine (PEPCID) 20 MG tablet Take 1 tablet by mouth 2 times daily 4/14/22  Yes Dante Rios MD        CURRENT Medications:  No current facility-administered medications for this visit. Allergies:  Eggs or egg-derived products and Other     Review of Systems:   A 14 point review of symptoms completed. Pertinent positives identified in the HPI, all other review of symptoms negative as below.       Objective:     Vitals:    07/11/22 1537 07/11/22 1542   BP: (!) 102/48 (!) 102/50   Pulse: 69    SpO2: 98%    Weight: 92 lb 8 oz (42 kg)    Height: 5' 1\" (1.549 m)       Weight: 92 lb 8 oz (42 kg)       PHYSICAL EXAM:    General:  Thin build, no acute distress   Head:  Normocephalic, atraumatic   Eyes:  Conjunctiva/corneas clear, anicteric sclerae    Nose: Nares normal, no drainage or sinus tenderness   Throat: No abnormalities of the lips, oral mucosa or tongue. Neck: Trachea midline. Neck supple with no lymphadenopathy, thyroid not enlarged, symmetric, no tenderness/mass/nodules, no Jugular venous pressure elevation    Lungs:   Clear to auscultation bilaterally, no wheezes, no rales, no respiratory distress breath sounds a little diminished. Chest Wall:  R chest wall incision well-healed. Heart:  Irregularly irregular, variable S1, II/VI BAY at apex without thrill, no rub, no S3/S4, PMI non-displaced. Abdomen:   Soft, non-tender, with normoactive bowel sounds. No masses, no hepatosplenomegaly   Extremities: No cyanosis, clubbing or pitting edema. Vascular: 2+ radial, dorsalis pedis and posterior tibial pulses bilaterally. Brisk carotid upstrokes without carotid bruit. A femoral bruit is present over right groin, R going incision well healed   Skin: Skin color, texture, turgor are normal with no rashes or ulceration. Pysch: Euthymic mood, appropriate affect   Neurologic: Oriented to person, place and time. No slurred speech or facial asymmetry. No motor or sensory deficits on gross examination.          Labs:   CBC:   Lab Results   Component Value Date/Time    WBC 7.8 06/09/2022 10:50 AM    RBC 4.07 06/09/2022 10:50 AM    HGB 12.3 06/09/2022 10:50 AM    HCT 37.4 06/09/2022 10:50 AM    MCV 92.0 06/09/2022 10:50 AM    RDW 14.7 06/09/2022 10:50 AM     06/09/2022 10:50 AM     CMP:  Lab Results   Component Value Date/Time     06/09/2022 10:50 AM    K 4.1 06/09/2022 10:50 AM    K 3.9 03/31/2022 06:44 AM    CL 99 06/09/2022 10:50 AM    CO2 32 06/09/2022 10:50 AM    BUN 11 06/09/2022 10:50 AM    CREATININE 0.6 06/09/2022 10:50 AM    GFRAA >60 2022 10:50 AM    AGRATIO 1.5 2022 06:44 AM    LABGLOM >60 2022 10:50 AM    GLUCOSE 93 2022 10:50 AM    PROT 7.3 2022 06:40 AM    CALCIUM 9.4 2022 10:50 AM    BILITOT 0.7 2022 06:40 AM    ALKPHOS 81 2022 06:40 AM    AST 27 2022 06:40 AM    ALT 22 2022 06:40 AM     PT/INR:  No results found for: PTINR  HgBA1c:  Lab Results   Component Value Date    LABA1C 5.6 2022     Lab Results   Component Value Date    TROPONINI 0.01 2022         Cardiac Data:   [id number]  Atrial fibrillation  Rightward axis  ST & T wave abnormality, consider lateral ischemia or digitalis effect  Abnormal ECG  When compared with ECG of 2022 07:46,  ST now depressed in Lateral leads  T wave inversion more evident in Inferior leads  T wave inversion now evident in Lateral leads    VL lower extremity arteries right-   Summary  Right groin hematoma without evidence of arterial disease or pseudoaneurysm. Echo: 2022  Summary  Limited echo for LV function/post op eval with limited doppler/color. Global left ventricular function is mildly decreased with ejection fraction estimated at 45%. EF by Guzman's method estimated at 45%. Mild concentric left ventricular hypertrophy. Hypokinesis of the anteroseptal and inferoseptal wall segments. Diastolic function not assessed due to mitral prosthesis. The right ventricle is normal in size with reduced function. The left atrium is severely dilated. The right atrium is moderately dilated. The anterior mitral valve leaflet appears thickened without reduced mobility. Normally functioning \"HUTCHINSON 38MM PHYSIO II ANNULOPLASTY RING\" in the mitral position with a max velocity of 1.27 m/sec,maximum gradient of 7 mmHg and a mean gradient of 2 mmHg. Mild to moderate mitral regurgitation. Mild to moderate tricuspid valve regurgitation.   Systolic pulmonary artery pressure (SPAP) estimated at 42 mmHg (RA pressure 8 mmHg), consistent withmild pulmonary  hypertension. There is a moderate localized near left ventricle, remaining appears small pericardial effusion noted. 04/06/22 s/p minimal access MVr w/ ring, right-sided MAZE, ISAAC clip, cryo, & XAVIER     Carotid duplex: 4/4/22  Summary        The bilateral proximal internal carotid artery reveals a <50% diameter    reducing stenosis. The bilateral vertebral arteries demonstrate normal antegrade flow       C:  4/1/22  Findings:  1. Hemodynamics:  A. Right heart catheterization                   1. RA:  6 mmHg                   2. RV:  34/5/7 mmHG                   3. PA:  37/18/27 mmHG                   4. PCWP: 22 mmHg                   5. Ignacio CO: 3.9 L/min                   6. Ignacio CI: 2.75  L/min*m2                   7. Ignacio SVR: 1571 d/s                   8. Transpulmonary gradient: 5       9. Ignacio PVR: 1 wood unit              B. Opening arterial pressure: 103/67/82              C. LVEDP: 15 mmhg  PA sat 71%  PW sat 95%  RA sat 70%  Ao sat 97% (on NRB at time of draw). 2.  Coronary anatomy:  A. Left main artery: The left main artery bifurcates into the left anterior descending artery and left circumflex artery. The left main artery was normal.   B. Left anterior descending artery: Transapical vessel which gives rise to 2 diagonal arteries. The left anterior descending artery had no disease. Diminutive vessel distally. The diagonal arteries had no disease. C. Left circumflex artery: Non-dominant vessel that gives rise to 2 obtuse marginal arteries. The left circumflex artery had no disease. The first obtuse marginal artery had no diease. The second marginal artery was tortuous with no disease. D. Right coronary artery: Large Dominant vessel that gives rise to the posterior descending artery and posterolateral branch. The right coronary artery had no disease. The posterior descending artery had no disease.  The posterolateral branch had no disease. XAVIER: 4/1/22  Summary   The left ventricle appears mildly dilated with preserved systolic function. Ejection fraction is visually estimated at 55%. Normal right ventricular size and function. Gigantic left atrium. No left atrial/left atrial appendage thrombus. Flail posterior mitral valve leaflet with ruptured chordae seen prolapsing   into Left atrium. Severe anterior-directed, eccentric mitral regurgitation. Trace aortic regurgitation. Mild tricuspid regurgitation. Mild pulmonic regurgitation present. A bubble study was performed and showed delayed evidence of right to left   shunt consistent with a small intrapulmonary shunt. Echo:3/31/2022  Summary   Irregular heart rate throughout study. Normal left ventricle systolic function with an estimated ejection fraction of 55%. No regional wall motion abnormalities are seen. Diastolic dysfunction grade and filling pressure are indeterminate due to arrhythmia. The right ventricle is normal in size and function. Gigantic left atrium. The right atrium is mildly dilated. Severely thickened mitral valve leaflets. The posterior mitral valve leaflet appears flail with likely ruptured chordae, resulting in severe eccentric, anteriorly-directed mitral regurgitation. Degree of left atrial enlargement suggests chronic MR, Cannot however, rule out super-imposed vegetation/endocarditis. Clinical correlation advised. Mild aortic valve regurgitation. Mild to moderate tricuspid valve regurgitation. Systolic pulmonary artery pressure (sPAP) is normal and estimated at 30 mmHg (right atrial pressure 8 mmHg)   There is a small right pleural effusion. No prior studies available for comparison.     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 Keep appointment with Kevin Austin for further evaluation and treatment of atrial fibrillation with fatigue and shortness of breath with exertion. 2.  Continue current medications. No room to titrate GDMT   3. Patient is safe to start working with Cardiac rehab. She has significant deconditioning and would benefit greatly from this. Continue to work on nutrition. Follow up with me 6 months     This note is scribed in the presence of Burtis Phoenix by Ernesto Anguiano RN      The scribes documentation has been prepared under my direction and personally reviewed by me in its entirety. I confirm that the note above accurately reflects all work, treatment, procedures, and medical decision making performed by me. Lizbet Ma MD, personally performed the services described in this documentation as scribed by  Ernesto Anguiano RN in my presence, and it is both accurate and complete to the best of our ability. I will address the patient's cardiac risk factors and adjusted pharmacologic treatment as needed. In addition, I have reinforced the need for patient directed risk factor modification. All questions and concerns were addressed to the patient/family. Alternatives to my treatment were discussed. Thank you for allowing us to participate in the care of Coral Cisneros. Please call me with any questions [phone].     Burtis Phoenix, MD, [address] [facility]  Cardiovascular Disease  [facility]  [phone] [facility]  [phone] [address] [place] [address]  7/11/2022 3:47 PM

## 2022-07-06 ENCOUNTER — HOSPITAL ENCOUNTER (OUTPATIENT)
Dept: CARDIAC REHAB | Age: 67
Setting detail: THERAPIES SERIES
Discharge: HOME | End: 2022-07-06
Payer: MEDICARE

## 2022-07-11 ENCOUNTER — OFFICE VISIT (OUTPATIENT)
Dept: CARDIOLOGY CLINIC | Age: 67
End: 2022-07-11
Payer: MEDICARE

## 2022-07-11 VITALS
WEIGHT: 92.5 LBS | SYSTOLIC BLOOD PRESSURE: 102 MMHG | BODY MASS INDEX: 17.47 KG/M2 | HEIGHT: 61 IN | HEART RATE: 69 BPM | OXYGEN SATURATION: 98 % | DIASTOLIC BLOOD PRESSURE: 50 MMHG

## 2022-07-11 DIAGNOSIS — I51.7 CARDIOMEGALY: ICD-10-CM

## 2022-07-11 DIAGNOSIS — I48.91 ATRIAL FIBRILLATION WITH RVR (HCC): Primary | ICD-10-CM

## 2022-07-11 PROCEDURE — 99214 OFFICE O/P EST MOD 30 MIN: CPT | Performed by: INTERNAL MEDICINE

## 2022-07-11 PROCEDURE — 1123F ACP DISCUSS/DSCN MKR DOCD: CPT | Performed by: INTERNAL MEDICINE

## 2022-07-11 NOTE — PATIENT INSTRUCTIONS
PLAN:  1. Keep appointment with Beka Ibrahim to discuss afib/fatigue. 2. Ok to try CBD oil. 3. Reviewed recent testing since last visit. 4. Continue current medications.        Follow up with me 6 months

## 2022-07-13 ENCOUNTER — TELEPHONE (OUTPATIENT)
Dept: CARDIAC REHAB | Age: 67
End: 2022-07-13

## 2022-07-13 NOTE — CARDIO/PULMONARY
Received signed initial ITP.  Called pt and scheduled first session of cardiac rehab for 07/15/22 at 15:45pm.

## 2022-07-14 ASSESSMENT — PATIENT HEALTH QUESTIONNAIRE - PHQ9
SUM OF ALL RESPONSES TO PHQ9 QUESTIONS 1 & 2: 5
8. MOVING OR SPEAKING SO SLOWLY THAT OTHER PEOPLE COULD HAVE NOTICED. OR THE OPPOSITE, BEING SO FIGETY OR RESTLESS THAT YOU HAVE BEEN MOVING AROUND A LOT MORE THAN USUAL: 0
6. FEELING BAD ABOUT YOURSELF - OR THAT YOU ARE A FAILURE OR HAVE LET YOURSELF OR YOUR FAMILY DOWN: 2
4. FEELING TIRED OR HAVING LITTLE ENERGY: 3
SUM OF ALL RESPONSES TO PHQ QUESTIONS 1-9: 14
2. FEELING DOWN, DEPRESSED OR HOPELESS: 2
1. LITTLE INTEREST OR PLEASURE IN DOING THINGS: 3
5. POOR APPETITE OR OVEREATING: 3
SUM OF ALL RESPONSES TO PHQ QUESTIONS 1-9: 14
SUM OF ALL RESPONSES TO PHQ QUESTIONS 1-9: 14
9. THOUGHTS THAT YOU WOULD BE BETTER OFF DEAD, OR OF HURTING YOURSELF: 0
7. TROUBLE CONCENTRATING ON THINGS, SUCH AS READING THE NEWSPAPER OR WATCHING TELEVISION: 0
SUM OF ALL RESPONSES TO PHQ QUESTIONS 1-9: 14
3. TROUBLE FALLING OR STAYING ASLEEP: 1

## 2022-07-15 ENCOUNTER — HOSPITAL ENCOUNTER (OUTPATIENT)
Dept: CARDIAC REHAB | Age: 67
Setting detail: THERAPIES SERIES
Discharge: HOME | End: 2022-07-15
Payer: MEDICARE

## 2022-07-15 PROCEDURE — 93798 PHYS/QHP OP CAR RHAB W/ECG: CPT

## 2022-07-18 ENCOUNTER — HOSPITAL ENCOUNTER (OUTPATIENT)
Dept: CARDIAC REHAB | Age: 67
Setting detail: THERAPIES SERIES
Discharge: HOME | End: 2022-07-18
Payer: MEDICARE

## 2022-07-18 PROCEDURE — 93798 PHYS/QHP OP CAR RHAB W/ECG: CPT

## 2022-07-20 ENCOUNTER — HOSPITAL ENCOUNTER (OUTPATIENT)
Dept: CARDIAC REHAB | Age: 67
Setting detail: THERAPIES SERIES
Discharge: HOME | End: 2022-07-20
Payer: MEDICARE

## 2022-07-20 PROCEDURE — 93798 PHYS/QHP OP CAR RHAB W/ECG: CPT

## 2022-07-22 ENCOUNTER — HOSPITAL ENCOUNTER (OUTPATIENT)
Dept: CARDIAC REHAB | Age: 67
Setting detail: THERAPIES SERIES
Discharge: HOME | End: 2022-07-22
Payer: MEDICARE

## 2022-07-22 PROCEDURE — 93798 PHYS/QHP OP CAR RHAB W/ECG: CPT

## 2022-07-25 ENCOUNTER — HOSPITAL ENCOUNTER (OUTPATIENT)
Dept: CARDIAC REHAB | Age: 67
Setting detail: THERAPIES SERIES
Discharge: HOME | End: 2022-07-25
Payer: MEDICARE

## 2022-07-25 PROCEDURE — 93798 PHYS/QHP OP CAR RHAB W/ECG: CPT

## 2022-07-26 ENCOUNTER — OFFICE VISIT (OUTPATIENT)
Dept: CARDIOLOGY CLINIC | Age: 67
End: 2022-07-26
Payer: MEDICARE

## 2022-07-26 VITALS
BODY MASS INDEX: 17.94 KG/M2 | HEART RATE: 83 BPM | DIASTOLIC BLOOD PRESSURE: 64 MMHG | WEIGHT: 95 LBS | SYSTOLIC BLOOD PRESSURE: 128 MMHG | OXYGEN SATURATION: 97 % | HEIGHT: 61 IN

## 2022-07-26 DIAGNOSIS — K21.9 GASTROESOPHAGEAL REFLUX DISEASE WITHOUT ESOPHAGITIS: ICD-10-CM

## 2022-07-26 DIAGNOSIS — E03.9 HYPOTHYROIDISM, UNSPECIFIED TYPE: ICD-10-CM

## 2022-07-26 DIAGNOSIS — I25.10 CORONARY ARTERY DISEASE WITHOUT ANGINA PECTORIS, UNSPECIFIED VESSEL OR LESION TYPE, UNSPECIFIED WHETHER NATIVE OR TRANSPLANTED HEART: ICD-10-CM

## 2022-07-26 DIAGNOSIS — I48.91 NEW ONSET ATRIAL FIBRILLATION (HCC): ICD-10-CM

## 2022-07-26 DIAGNOSIS — I48.91 ATRIAL FIBRILLATION WITH RVR (HCC): Primary | ICD-10-CM

## 2022-07-26 PROCEDURE — 93000 ELECTROCARDIOGRAM COMPLETE: CPT | Performed by: INTERNAL MEDICINE

## 2022-07-26 PROCEDURE — 99214 OFFICE O/P EST MOD 30 MIN: CPT | Performed by: INTERNAL MEDICINE

## 2022-07-26 RX ORDER — LEVOTHYROXINE SODIUM 0.03 MG/1
25 TABLET ORAL DAILY
Qty: 30 TABLET | Refills: 0 | Status: SHIPPED | OUTPATIENT
Start: 2022-07-26

## 2022-07-26 RX ORDER — METOPROLOL SUCCINATE 50 MG/1
50 TABLET, EXTENDED RELEASE ORAL DAILY
Qty: 30 TABLET | Refills: 3 | Status: SHIPPED | OUTPATIENT
Start: 2022-07-26 | End: 2022-11-23

## 2022-07-26 RX ORDER — ATORVASTATIN CALCIUM 40 MG/1
40 TABLET, FILM COATED ORAL NIGHTLY
Qty: 30 TABLET | Refills: 0 | Status: SHIPPED | OUTPATIENT
Start: 2022-07-26

## 2022-07-26 RX ORDER — DIGOXIN 125 MCG
125 TABLET ORAL DAILY
Qty: 30 TABLET | Refills: 3 | Status: SHIPPED | OUTPATIENT
Start: 2022-07-26

## 2022-07-26 RX ORDER — FAMOTIDINE 20 MG/1
20 TABLET, FILM COATED ORAL 2 TIMES DAILY
Qty: 30 TABLET | Refills: 0 | Status: SHIPPED | OUTPATIENT
Start: 2022-07-26

## 2022-07-26 NOTE — PROGRESS NOTES
[address]   Electrophysiology Consult Note            Date: 7/26/22  Patient Name: Saray Zavala  YOB: 1955    Primary Care Physician: Queen Marely MD    CHIEF COMPLAINT:   Chief Complaint   Patient presents with    New Patient    Atrial Fibrillation    Medication Refill     HISTORY OF PRESENT ILLNESS: Saray Zavala is a 75 y.o. female with a PMH significant for CHF, CM, s/p MV repair, and AF. Patient was admitted on 3/31/2022 with AF with RVR. A XAVIER in 4/2022 showed severe MR. She underwent a MV repair and right sided MAZE with ISAAC clip. She underwent an unsuccessful DCCV on 6/9/2022. Today, 7/26/2022, ECG demonstrates AF (83). She reports that she feels SOB when she is in AF. She is taking her medications as prescribed. Denies recent issues with bleeding or bruising. Patient denies current edema, chest pain, palpitations, dizziness or syncope. Past Medical History:   has a past medical history of CAD (coronary artery disease), Hyperlipidemia, Thyroid disease, and Varicose vein. Past Surgical History:   has a past surgical history that includes Tonsillectomy and Mitral valve repair (N/A, 4/6/2022). Allergies:  Eggs or egg-derived products and Other    Social History:   reports that she has never smoked. She has never used smokeless tobacco. She reports that she does not drink alcohol and does not use drugs. Family History: family history includes Cancer in her father, paternal aunt, and paternal grandmother. Home Medications:    Prior to Admission medications    Medication Sig Start Date End Date Taking?  Authorizing Provider   diphenhydrAMINE HCl (BENADRYL ALLERGY PO) Take by mouth   Yes Historical Provider, MD   metoprolol succinate (TOPROL XL) 50 MG extended release tablet Take 1 tablet by mouth daily 6/10/22 12/7/22 Yes Kathleen Diane MD   acetaminophen (TYLENOL) 650 MG extended release tablet Take 650 mg by mouth every 8 hours as needed for Pain   Yes Historical Provider, MD   rivaroxaban (XARELTO) 20 MG TABS tablet Take 1 tablet by mouth daily 4/14/22  Yes Batsheva Valentin MD   atorvastatin (LIPITOR) 40 MG tablet Take 1 tablet by mouth nightly 4/14/22  Yes Batsheva Valentin MD   digoxin (LANOXIN) 125 MCG tablet Take 1 tablet by mouth daily 4/15/22  Yes Batsheva Valentin MD   levothyroxine (SYNTHROID) 25 MCG tablet Take 1 tablet by mouth Daily 4/15/22  Yes Batsheva Valentin MD   famotidine (PEPCID) 20 MG tablet Take 1 tablet by mouth 2 times daily 4/14/22  Yes Batsheva Valentin MD       REVIEW OF SYSTEMS:    All 14-point review of systems are completed and  pertinent positives are mentioned in the history of present illness. Other  systems are reviewed and are negative. Physical Examination:    /64   Pulse 83   Ht 5' 1\" (1.549 m)   Wt 95 lb (43.1 kg)   SpO2 97%   BMI 17.95 kg/m²      Constitutional and General Appearance:    alert, cooperative, no distress, and appears stated age  [de-identified]:    PERRLA, no cervical lymphadenopathy. No masses palpable. Normal oral  mucosa  Respiratory:  Normal excursion and expansion without use of accessory muscles  Resp Auscultation: Normal breath sounds without dullness or wheezing  Cardiovascular: The apical impulse is not displaced  Irregular rate and rhythm. Grade I/VI systolic murmur best LLSB. Abdomen:  No masses or tenderness  Bowel sounds present  Extremities:   No Cyanosis or Clubbing   Lower extremity edema: No  Skin: Warm and dry  Neurological:  Alert and oriented. Moves all extremities well  No abnormalities of mood, affect, memory, mentation, or behavior are noted    DATA:    ECG 7/26/22: Personally reviewed. Echo 3/30/2022  Summary   Irregular heart rate throughout study. Normal left ventricle systolic function with an estimated ejection fraction   of 55%. No regional wall motion abnormalities are seen. Diastolic dysfunction grade and filling pressure are indeterminate due to   arrhythmia.    The right ventricle is normal in size and function. Gigantic left atrium. The right atrium is mildly dilated. Severely thickened mitral valve leaflets. The posterior mitral valve leaflet   appears flail with likely ruptured chordae, resulting in severe eccentric,   anteriorly-directed mitral regurgitation. Degree of left atrial enlargement   suggests chronic MR, Cannot however, rule out super-imposed   vegetation/endocarditis. Clinical correlation advised. Mild aortic valve regurgitation. Mild to moderate tricuspid valve regurgitation. Systolic pulmonary artery pressure (sPAP) is normal and estimated at 30 mmHg   (right atrial pressure 8 mmHg)   There is a small right pleural effusion. 04/01/2022                            CARDIAC CATHETERIZATION REPORT     Date of Procedure: 4/1/22  : Judit Ansari MD  Primary Indication: Severe mitral regurgitation      Procedures Performed:  1. Coronary angiography  2. Left heart catheterization  3. Right heart catheterization     Procedural Details:  Access: Local anesthetic was given and access was obtained in the right radial artery using a micropuncture technique and a 5F Terumo Slender Sheath was placed without difficulty. A 7F Terumo Slender Sheath was placed in the right brachial vein via wire exchange of an existing IV that was placed under sterile conditions. Diagnostic: A 7F TD Jeffrey Wilson catheter was used to perform the right heart catheterization. A 5F JR4 catheter and 5F JL3.5 catheter were used to perform selective right and left coronary angiography, respectively. A 5F Pigtail catheter was used to perform the left heart catheterization. No significant gradient was observed on pull-back of the catheter across the aortic valve. Hemostasis: At the end of the procedure, the radial sheath was removed and a hemoband was placed over the arteriotomy site and filled with 15 cc air to maintain hemostasis.   The venous sheath was moved and manual pressure applied to maintain hemostasis. Findings:  Hemodynamics:  A. Right heart catheterization                   1. RA:  6 mmHg                   2. RV:  34/5/7 mmHG                   3. PA:  37/18/27 mmHG                   4. PCWP: 22 mmHg                   5. Ignacio CO: 3.9 L/min                   6. Ignacio CI: 2.75  L/min*m2                   7. Ignacio SVR: 1571 d/s                   8. Transpulmonary gradient: 5       9. Ignacio PVR: 1 wood unit              B. Opening arterial pressure: 103/67/82              C. LVEDP: 15 mmhg  PA sat 71%  PW sat 95%  RA sat 70%  Ao sat 97% (on NRB at time of draw). 2.  Coronary anatomy:  A. Left main artery: The left main artery bifurcates into the left anterior descending artery and left circumflex artery. The left main artery was normal.   B. Left anterior descending artery: Transapical vessel which gives rise to 2 diagonal arteries. The left anterior descending artery had no disease. Diminutive vessel distally. The diagonal arteries had no disease. C. Left circumflex artery: Non-dominant vessel that gives rise to 2 obtuse marginal arteries. The left circumflex artery had no disease. The first obtuse marginal artery had no diease. The second marginal artery was tortuous with no disease. D. Right coronary artery: Large Dominant vessel that gives rise to the posterior descending artery and posterolateral branch. The right coronary artery had no disease. The posterior descending artery had no disease. The posterolateral branch had no disease. Technical Factors:  Complications:  None  Estimated blood loss: <15cc  Sedation: Moderate conscious sedation was administered by qaulified nursing personnel under continuous hemodynamic monitoring, starting at 15:27 and ending at 16:46. Medications: 2 mg Versed, 25 mcg Fentanyl  Contrast: 100 cc of Isovue     Impression:  1. Normal coronaries. 2.  High-Normal left ventricular filling pressure. 3.  Normal cardiac index  4. rivaroxaban. - Continue digoxin. - Follow up with me in 6 months to review options. RECOMMENDATIONS:  Discussed at length treatment options for AF:   1. Medications to slow heart rate (goal of less than 90 at rest, less than 110 with mild activity)   2. Medications for rhythm control (amiodarone, dronedarone, or dofetilide which requires hospital admission for three days). Discussed risks and benefits. 3. Ablation to burn the abnormal electrical activity within the heart. Discussed risks and benefits. 4. Pace and Ablate to put pacemaker in the heart and burn the electrical activity in the heart. Would be dependent upon pacemaker for the rest of your life. This would be a last resort. 5. Cardioversion to electrically shock heart back into normal rhythm. 2.  Continue oral anticoagulant to prevent stroke in the presence of AF. 3.  Think about your options and let us know how you would like to            proceed. 4.  Follow up in 6 months. QUALITY MEASURES  1. Tobacco Cessation Counseling: NA  2. Retake of BP if >140/90:   NA  3. Documentation to PCP/referring for new patient:  Sent to PCP at close of office visit  4. CAD patient on anti-platelet: NA  5. CAD patient on STATIN therapy:  Yes  6. Patient with CHF and aFib on anticoagulation:  Yes     All questions and concerns were addressed to the patient/family. Alternatives to my treatment were discussed. Dr. Johnson Prader MD  Electrophysiology  [facility]. [address] [place] [address]. [address]. [facility] [address]  Phone: [phone]  Fax: [phone]     NOTE: This report was transcribed using voice recognition software. Every effort was made to ensure accuracy, however, inadvertent computerized transcription errors may be present. Miryam Massey RN, am scribing for and in the presence of Dr. Gunnar Velásquez.  07/26/22 4:06 PM   Rocky Caceres RN    The scribe's documentation has been prepared under my direction and personally reviewed by me in its entirety. I confirm that the note above accurately reflects all work, physical examination, the discussion of treatments and procedures, and medical decision making performed by me. Melvi Mejía MD personally performed the services described in this documentation as scribed by nurse in my presence, and is both accurate and complete.     Electronically signed by Wayne Be MD on 7/26/2022 at 9:23 PM

## 2022-07-27 ENCOUNTER — HOSPITAL ENCOUNTER (OUTPATIENT)
Dept: CARDIAC REHAB | Age: 67
Setting detail: THERAPIES SERIES
Discharge: HOME | End: 2022-07-27
Payer: MEDICARE

## 2022-07-27 VITALS — WEIGHT: 92 LBS | BODY MASS INDEX: 17.38 KG/M2

## 2022-07-27 PROCEDURE — 93798 PHYS/QHP OP CAR RHAB W/ECG: CPT

## 2022-07-27 ASSESSMENT — EJECTION FRACTION: EF_VALUE: 45

## 2022-07-27 NOTE — PLAN OF CARE
Individual Treatment Plan  Cardiac Rehabilitation    Reassessment  Name: Ayana Gross Reassessment: 30 days   : 1955 Primary Diagnosis: Valve (MVR Z98.890)    Age: 75 y.o. Referring Physician:  Frida Cowart MD   MRN: [id number] Primary Care Physician: Francia Brady MD     Allergies   Allergen Reactions    Eggs Or Egg-Derived Products     Other      Pt states she is not lactose intolerant, she is allergic to dairy. Date: 2022    Exercise Assessment  Stages of Change: Action   Risk Stratification: High  Fall Risk: No flowsheet data found. Assistive Devices: None  Antihypertensives: yes - Metoprolol    Exercise Prescription        Mode: . Treadmill, Bike (Upright or recumbent), NuStep, Rower, SciFit, Airdyne, Arm Ergometer, Recumbent Eliptical, and Walking      Frequency: 2-3 days/week supervised      Intensity:       RPE 11-14      Current Target Heart Rate (THR) zone: Resting HR + 20-30  New THR zone:       Duration: 30+ minutes/day      Resistance Training: Yes, 3 days per week      Progression: Increase exercise by 0.5 METs every 1 to 2 weeks to goal of 4 to 6 METs      Supplemental oxygen: is not on home oxygen therapy. Patient tolerates low levels of exercise well in target RPE/HR zones. Patient in Afib with HR in . Plan  Home Exercise:Yes  Mode:Walking  Resistance Training: yes     Target Goals  Achieve exercise to 3-4.9 METs     Education  Ms. Jaelyn Albarran was educated on the importance of Importance of physical activity and exercise progression and home exercise program    Nutrition Assessment  Stages of Change: Action    Weight: 92 lb (41.7 kg) BMI (Calculated): 0     Nutrition Survey: Rate Your Plate Score:       5-32% Weight Loss: none   Patient Weight Goal: 92 lb   Recommended Diet: low fat, low cholesterol  Diabetic:  No  Diabetic Medications: not applicable  A0P:    Lab Results   Component Value Date    LABA1C 5.6 2022                   Fasting Blood Glucose:  No results found for: GLU n/a  Cholesterol, Total   Date Value Ref Range Status   03/31/2022 185 0 - 199 mg/dL Final     HDL   Date Value Ref Range Status   03/31/2022 70 (H) 40 - 60 mg/dL Final       Lab Results   Component Value Date    LDLCALC 102 (H) 03/31/2022     Triglycerides   Date Value Ref Range Status   03/31/2022 64 0 - 150 mg/dL Final       Alcohol Use:ALCHXP@     Nutrition Intervention  Attend education classes related to nutrition    Target Goals  Maintain weight     Education  Ms. Rolf Rodríguez was educated on the importance of maintaining optimal weight/BMI. Psychosocial Assessment  Stages of Change: Action  Occupation: Retired Currently working: not applicable  Estimated return to work date: n/a    Psychosocial Test  Tool Used: PHQ-2/9 Today's PHQ:  No flowsheet data found. Interpretation of Total Score Depression Severity: 1-4 = Minimal depression, 5-9 = Mild depression, 10-14 = Moderate depression, 15-19 = Moderately severe depression, 20-27 = Severe depression    Reports psychosocial symptoms: yes  Currently on medication therapy: no  Currently seeing a mental health professional: no  Positive support system: yes    Psychosocial Intervention  Attend education classes related to stress management and relaxation    Target Goals  Improve quality of live, self-efficacy and depression screening scores as measured by the appropriate tool       Education Assessment  Stages of Change: Action  Barriers to Learning: No barriers  Personal Learning Style: Verbal and Video  Social History     Tobacco Use   Smoking Status Never   Smokeless Tobacco Never       Education Intervention/Learning Needs Identified  Exercise, Stress management and relaxation, and Weight management    Target Goals  Articulate understanding of self-management and prevention/treatment of cardiac disease  and Restore to highest level of independent functionality     Education  Ms. Rolf Rodríguez was educated on the importance of healthy coping techniques

## 2022-07-29 ENCOUNTER — HOSPITAL ENCOUNTER (OUTPATIENT)
Dept: CARDIAC REHAB | Age: 67
Setting detail: THERAPIES SERIES
Discharge: HOME | End: 2022-07-29
Payer: MEDICARE

## 2022-07-29 PROCEDURE — 93798 PHYS/QHP OP CAR RHAB W/ECG: CPT

## 2022-08-01 ENCOUNTER — HOSPITAL ENCOUNTER (OUTPATIENT)
Dept: CARDIAC REHAB | Age: 67
Setting detail: THERAPIES SERIES
Discharge: HOME | End: 2022-08-01
Payer: MEDICARE

## 2022-08-01 PROCEDURE — 93798 PHYS/QHP OP CAR RHAB W/ECG: CPT

## 2022-08-03 ENCOUNTER — HOSPITAL ENCOUNTER (OUTPATIENT)
Dept: CARDIAC REHAB | Age: 67
Setting detail: THERAPIES SERIES
Discharge: HOME | End: 2022-08-03
Payer: MEDICARE

## 2022-08-03 PROCEDURE — 93798 PHYS/QHP OP CAR RHAB W/ECG: CPT

## 2022-08-05 ENCOUNTER — HOSPITAL ENCOUNTER (OUTPATIENT)
Dept: CARDIAC REHAB | Age: 67
Setting detail: THERAPIES SERIES
Discharge: HOME | End: 2022-08-05
Payer: MEDICARE

## 2022-08-05 PROCEDURE — 93798 PHYS/QHP OP CAR RHAB W/ECG: CPT

## 2022-08-08 ENCOUNTER — HOSPITAL ENCOUNTER (OUTPATIENT)
Dept: CARDIAC REHAB | Age: 67
Setting detail: THERAPIES SERIES
Discharge: HOME | End: 2022-08-08
Payer: MEDICARE

## 2022-08-08 PROCEDURE — 93798 PHYS/QHP OP CAR RHAB W/ECG: CPT

## 2022-08-10 ENCOUNTER — HOSPITAL ENCOUNTER (OUTPATIENT)
Dept: CARDIAC REHAB | Age: 67
Setting detail: THERAPIES SERIES
Discharge: HOME | End: 2022-08-10
Payer: MEDICARE

## 2022-08-10 PROCEDURE — 93798 PHYS/QHP OP CAR RHAB W/ECG: CPT

## 2022-08-12 ENCOUNTER — HOSPITAL ENCOUNTER (OUTPATIENT)
Dept: CARDIAC REHAB | Age: 67
Setting detail: THERAPIES SERIES
Discharge: HOME | End: 2022-08-12
Payer: MEDICARE

## 2022-08-12 VITALS — HEIGHT: 61 IN | BODY MASS INDEX: 18.12 KG/M2 | WEIGHT: 96 LBS

## 2022-08-12 PROCEDURE — 93798 PHYS/QHP OP CAR RHAB W/ECG: CPT

## 2022-08-15 ENCOUNTER — HOSPITAL ENCOUNTER (OUTPATIENT)
Dept: CARDIAC REHAB | Age: 67
Setting detail: THERAPIES SERIES
Discharge: HOME | End: 2022-08-15
Payer: MEDICARE

## 2022-08-15 PROCEDURE — 93798 PHYS/QHP OP CAR RHAB W/ECG: CPT

## 2022-08-17 ENCOUNTER — HOSPITAL ENCOUNTER (OUTPATIENT)
Dept: CARDIAC REHAB | Age: 67
Setting detail: THERAPIES SERIES
Discharge: HOME | End: 2022-08-17
Payer: MEDICARE

## 2022-08-17 PROCEDURE — 93798 PHYS/QHP OP CAR RHAB W/ECG: CPT

## 2022-08-19 ENCOUNTER — HOSPITAL ENCOUNTER (OUTPATIENT)
Dept: CARDIAC REHAB | Age: 67
Setting detail: THERAPIES SERIES
Discharge: HOME | End: 2022-08-19
Payer: MEDICARE

## 2022-08-22 ENCOUNTER — HOSPITAL ENCOUNTER (OUTPATIENT)
Dept: CARDIAC REHAB | Age: 67
Setting detail: THERAPIES SERIES
Discharge: HOME | End: 2022-08-22
Payer: MEDICARE

## 2022-08-22 PROCEDURE — 93798 PHYS/QHP OP CAR RHAB W/ECG: CPT

## 2022-08-24 ENCOUNTER — HOSPITAL ENCOUNTER (OUTPATIENT)
Dept: CARDIAC REHAB | Age: 67
Setting detail: THERAPIES SERIES
Discharge: HOME | End: 2022-08-24
Payer: MEDICARE

## 2022-08-24 PROCEDURE — 93798 PHYS/QHP OP CAR RHAB W/ECG: CPT

## 2022-08-26 ENCOUNTER — HOSPITAL ENCOUNTER (OUTPATIENT)
Dept: CARDIAC REHAB | Age: 67
Setting detail: THERAPIES SERIES
Discharge: HOME | End: 2022-08-26
Payer: MEDICARE

## 2022-08-26 PROCEDURE — 93798 PHYS/QHP OP CAR RHAB W/ECG: CPT

## 2022-08-26 ASSESSMENT — EJECTION FRACTION: EF_VALUE: 45

## 2022-08-26 NOTE — PLAN OF CARE
Individual Treatment Plan  Cardiac Rehabilitation    Reassessment  Name: Holly Narayan Reassessment: 60 days   : 1955 Primary Diagnosis: Valve    Age: 75 y.o. Referring Physician:  Mayra Barboza MD   MRN: [id number] Primary Care Physician: Shaun Vázquez MD     Allergies   Allergen Reactions    Eggs Or Egg-Derived Products     Other      Pt states she is not lactose intolerant, she is allergic to dairy. Date: 2022    Visits:   Patient attends rehab regularly. Exercise Assessment  Stages of Change: Action   Risk Stratification: Medium  Fall Risk: No flowsheet data found. Assistive Devices: None  Resting BP range: 92//80  Exercise BP range: 110//74  Patient is in Afib. Patient tolerates low levels of exercise well in target RPE/HR zones. Antihypertensives: yes - Metoprolol    Exercise Prescription        Mode: . Treadmill, Bike (Upright or recumbent), NuStep, Rower, Arm Ergometer, Recumbent Eliptical, and Walking      Frequency: 2-3 days/week supervised      Intensity:RPE 11-14      Current Target Heart Rate: Resting   New Target Heart Rate zone:   Max heart rate:108  Max Met Level: 2.7  Goal Met Level: >3.0      Duration: 32+ minutes/day      Resistance Training: Yes, 3 days per week      Progression: Increase exercise by 0.5 METs every 1 to 2 weeks to goal of 4 to 6 METs      Supplemental oxygen: is not on home oxygen therapy. Plan  Home Exercise:Yes  Mode:Walking  Resistance Training: yes     Target Goals  Achieve exercise to 3-4.9 METs , Adhere to 5 days per week exercise program , Adhere to independent aerobic home exercise program, CRP, fitness center , and Increase exercise endurance and stamina   Previous goals Achieve exercise to 3-4.9 METs     Education  Ms. Vicky Pate was educated on the importance of Importance of physical activity and exercise progression, equipment orientation, and home exercise program    Nutrition Assessment  Stages of Change:

## 2022-08-29 ENCOUNTER — HOSPITAL ENCOUNTER (OUTPATIENT)
Dept: CARDIAC REHAB | Age: 67
Setting detail: THERAPIES SERIES
Discharge: HOME | End: 2022-08-29
Payer: MEDICARE

## 2022-08-29 PROCEDURE — 93798 PHYS/QHP OP CAR RHAB W/ECG: CPT

## 2022-08-31 ENCOUNTER — HOSPITAL ENCOUNTER (OUTPATIENT)
Dept: CARDIAC REHAB | Age: 67
Setting detail: THERAPIES SERIES
Discharge: HOME | End: 2022-08-31
Payer: MEDICARE

## 2022-08-31 PROCEDURE — 93798 PHYS/QHP OP CAR RHAB W/ECG: CPT

## 2022-09-02 ENCOUNTER — HOSPITAL ENCOUNTER (OUTPATIENT)
Dept: CARDIAC REHAB | Age: 67
Setting detail: THERAPIES SERIES
Discharge: HOME | End: 2022-09-02
Payer: MEDICARE

## 2022-09-02 PROCEDURE — 93798 PHYS/QHP OP CAR RHAB W/ECG: CPT

## 2022-09-05 ENCOUNTER — APPOINTMENT (OUTPATIENT)
Dept: CARDIAC REHAB | Age: 67
End: 2022-09-05
Payer: MEDICARE

## 2022-09-07 ENCOUNTER — HOSPITAL ENCOUNTER (OUTPATIENT)
Dept: CARDIAC REHAB | Age: 67
Setting detail: THERAPIES SERIES
Discharge: HOME | End: 2022-09-07
Payer: MEDICARE

## 2022-09-07 PROCEDURE — 93798 PHYS/QHP OP CAR RHAB W/ECG: CPT

## 2022-09-09 ENCOUNTER — HOSPITAL ENCOUNTER (OUTPATIENT)
Dept: CARDIAC REHAB | Age: 67
Setting detail: THERAPIES SERIES
Discharge: HOME | End: 2022-09-09
Payer: MEDICARE

## 2022-09-09 PROCEDURE — 93798 PHYS/QHP OP CAR RHAB W/ECG: CPT

## 2022-09-12 ENCOUNTER — HOSPITAL ENCOUNTER (OUTPATIENT)
Dept: CARDIAC REHAB | Age: 67
Setting detail: THERAPIES SERIES
Discharge: HOME | End: 2022-09-12
Payer: MEDICARE

## 2022-09-12 PROCEDURE — 93798 PHYS/QHP OP CAR RHAB W/ECG: CPT

## 2022-09-14 ENCOUNTER — HOSPITAL ENCOUNTER (OUTPATIENT)
Dept: CARDIAC REHAB | Age: 67
Setting detail: THERAPIES SERIES
Discharge: HOME | End: 2022-09-14
Payer: MEDICARE

## 2022-09-14 PROCEDURE — 93798 PHYS/QHP OP CAR RHAB W/ECG: CPT

## 2022-09-16 ENCOUNTER — HOSPITAL ENCOUNTER (OUTPATIENT)
Dept: CARDIAC REHAB | Age: 67
Setting detail: THERAPIES SERIES
Discharge: HOME | End: 2022-09-16
Payer: MEDICARE

## 2022-09-16 PROCEDURE — 93798 PHYS/QHP OP CAR RHAB W/ECG: CPT

## 2022-09-19 ENCOUNTER — HOSPITAL ENCOUNTER (OUTPATIENT)
Dept: CARDIAC REHAB | Age: 67
Setting detail: THERAPIES SERIES
Discharge: HOME | End: 2022-09-19
Payer: MEDICARE

## 2022-09-19 PROCEDURE — 93798 PHYS/QHP OP CAR RHAB W/ECG: CPT

## 2022-09-21 ENCOUNTER — HOSPITAL ENCOUNTER (OUTPATIENT)
Dept: CARDIAC REHAB | Age: 67
Setting detail: THERAPIES SERIES
Discharge: HOME | End: 2022-09-21
Payer: MEDICARE

## 2022-09-21 PROCEDURE — 93798 PHYS/QHP OP CAR RHAB W/ECG: CPT

## 2022-09-23 ENCOUNTER — HOSPITAL ENCOUNTER (OUTPATIENT)
Dept: CARDIAC REHAB | Age: 67
Setting detail: THERAPIES SERIES
Discharge: HOME | End: 2022-09-23
Payer: MEDICARE

## 2022-09-23 PROCEDURE — 93798 PHYS/QHP OP CAR RHAB W/ECG: CPT

## 2022-09-23 ASSESSMENT — EJECTION FRACTION: EF_VALUE: 55

## 2022-09-23 NOTE — PLAN OF CARE
Individual Treatment Plan  Cardiac Rehabilitation    Reassessment  Name: Lita Yeboah Reassessment: 90 days   : 1955 Primary Diagnosis: Valve    Age: 75 y.o. Referring Physician:  Dr. Meenu Otero   MRN: [id number] Primary Care Physician: PROVIDER KEITH, MD     Allergies   Allergen Reactions    Eggs Or Egg-Derived Products     Other      Pt states she is not lactose intolerant, she is allergic to dairy. Date: 2022    Exercise Assessment  Stages of Change: Action   Risk Stratification: Medium  Fall Risk: No flowsheet data found. Assistive Devices: None  Sessions completed:     Antihypertensives: yes - Metoprolol succinate    Exercise Prescription        Mode: . Treadmill, Bike (Upright or recumbent), NuStep, Arm Ergometer, Recumbent Eliptical, and Walking      Frequency: 2-3 days/week supervised      Intensity:RPE 11-14      Current Target Heart Rate:   New Target Heart Rate zone:      Duration: 32+ minutes/day      Resistance Training: Yes, 3 days per week      Progression: Increase exercise by 0.5 METs every 1 to 2 weeks to goal of 4 to 6 METs      Supplemental oxygen: is not on home oxygen therapy. Plan  Home Exercise:Yes  Mode:Treadmill, Bike (Upright or recumbent), NuStep, Rower, Arm Ergometer, Recumbent Eliptical, and Walking  Resistance Training: yes     Target Goals  Achieve exercise to 3-4.9 METs , Increase exercise endurance and stamina , and Participate in strength training   Previous goals Achieve exercise to 3-4.9 METs , Increase exercise endurance and stamina , and Participate in strength training     Education  Ms. Olga Lanes was educated on the importance of blood pressure education and blood pressure medication    Nutrition Assessment  Stages of Change: Action            Nutrition Survey: Rate Your Plate Score:       9-34% Weight Loss: 0   Patient Weight Goal: 97 lb   Recommended Diet: minimize processed foods, minimize simple sugars, and increase fiber intake  Diabetic:  No  Diabetic Medications: no  A1C:    Lab Results   Component Value Date    LABA1C 5.6 04/06/2022                  Fasting Blood Glucose: No results found for: GLU   Cholesterol, Total   Date Value Ref Range Status   03/31/2022 185 0 - 199 mg/dL Final     HDL   Date Value Ref Range Status   03/31/2022 70 (H) 40 - 60 mg/dL Final       Lab Results   Component Value Date    LDLCALC 102 (H) 03/31/2022     Triglycerides   Date Value Ref Range Status   03/31/2022 64 0 - 150 mg/dL Final         Nutrition Intervention  Attend education classes related to nutrition    Target Goals  Complete cardiac diet classes  and Maintain weight     Previous goals Maintain weight     Education  Ms. Olga Lanes was educated on the importance of maintaining optimal weight/BMI, nutrition guidelines, and adequate protein intake.     Psychosocial Assessment  Stages of Change: Action        Psychosocial Test  Tool Used: PHQ-2/9 Today's PHQ:    PHQ Scores 7/14/2022   PHQ2 Score 5   PHQ9 Score 14     Interpretation of Total Score Depression Severity: 1-4 = Minimal depression, 5-9 = Mild depression, 10-14 = Moderate depression, 15-19 = Moderately severe depression, 20-27 = Severe depression    Reports psychosocial symptoms: yes  Currently on medication therapy: no  Currently seeing a mental health professional: no  Positive support system: yes    Psychosocial Intervention  Attend education classes related to stress management and relaxation and Participate in an exercise program that improves psychosocial symptoms    Target Goals  Maximize stress management/coping skills   Previous goals Improve quality of live, self-efficacy and depression screening scores as measured by the appropriate tool     Education Assessment  Stages of Change: Action  Barriers to Learning: No barriers  Personal Learning Style: Verbal and Video  Social History     Tobacco Use   Smoking Status Never   Smokeless Tobacco Never       Education Intervention/Learning Needs Identified  Exercise, Risk factor for CAD, and Weight management    Target Goals  Articulate understanding of self-management and prevention/treatment of cardiac disease  and Attend appropriate group education classes     Previous goals Articulate understanding of self-management and prevention/treatment of cardiac disease  and Restore to highest level of independent functionality   Education  Ms. Allyson Pisano was educated on the importance of relaxation techniques                             Provider Review and Approval of this ITP    The above treatment plan and goals have been set for your patient during Cardiac Rehabilitation. Please review and Electronically Cosign/ or Sign (if Fax Provider)            *Please comment and make changes to the EX RX or treatment plan if needed*                       Electronic Provider comment:              Please indicate with Cosign Comment.                    Electronically signed by Heide Rendon RN on 9/23/22 at 8:45 AM EDT

## 2022-09-26 ENCOUNTER — HOSPITAL ENCOUNTER (OUTPATIENT)
Dept: CARDIAC REHAB | Age: 67
Setting detail: THERAPIES SERIES
Discharge: HOME | End: 2022-09-26
Payer: MEDICARE

## 2022-09-26 PROCEDURE — 93798 PHYS/QHP OP CAR RHAB W/ECG: CPT

## 2022-09-28 ENCOUNTER — HOSPITAL ENCOUNTER (OUTPATIENT)
Dept: CARDIAC REHAB | Age: 67
Setting detail: THERAPIES SERIES
Discharge: HOME | End: 2022-09-28
Payer: MEDICARE

## 2022-09-28 PROCEDURE — 93798 PHYS/QHP OP CAR RHAB W/ECG: CPT

## 2022-09-30 ENCOUNTER — HOSPITAL ENCOUNTER (OUTPATIENT)
Dept: CARDIAC REHAB | Age: 67
Setting detail: THERAPIES SERIES
Discharge: HOME | End: 2022-09-30
Payer: MEDICARE

## 2022-09-30 PROCEDURE — 93798 PHYS/QHP OP CAR RHAB W/ECG: CPT

## 2022-10-03 ENCOUNTER — HOSPITAL ENCOUNTER (OUTPATIENT)
Dept: CARDIAC REHAB | Age: 67
Setting detail: THERAPIES SERIES
Discharge: HOME | End: 2022-10-03
Payer: MEDICARE

## 2022-10-03 PROCEDURE — 93798 PHYS/QHP OP CAR RHAB W/ECG: CPT

## 2022-10-05 ENCOUNTER — HOSPITAL ENCOUNTER (OUTPATIENT)
Dept: CARDIAC REHAB | Age: 67
Setting detail: THERAPIES SERIES
Discharge: HOME | End: 2022-10-05
Payer: MEDICARE

## 2022-10-05 PROCEDURE — 93798 PHYS/QHP OP CAR RHAB W/ECG: CPT

## 2022-10-07 ENCOUNTER — HOSPITAL ENCOUNTER (OUTPATIENT)
Dept: CARDIAC REHAB | Age: 67
Setting detail: THERAPIES SERIES
Discharge: HOME | End: 2022-10-07
Payer: MEDICARE

## 2022-10-07 PROCEDURE — 93798 PHYS/QHP OP CAR RHAB W/ECG: CPT

## 2022-10-07 ASSESSMENT — EXERCISE STRESS TEST
PEAK_BP: 122/62
PEAK_HR: 107

## 2022-10-10 ENCOUNTER — HOSPITAL ENCOUNTER (OUTPATIENT)
Dept: CARDIAC REHAB | Age: 67
Setting detail: THERAPIES SERIES
End: 2022-10-10
Payer: MEDICARE

## 2022-10-12 ENCOUNTER — APPOINTMENT (OUTPATIENT)
Dept: CARDIAC REHAB | Age: 67
End: 2022-10-12
Payer: MEDICARE

## 2022-10-14 ENCOUNTER — APPOINTMENT (OUTPATIENT)
Dept: CARDIAC REHAB | Age: 67
End: 2022-10-14
Payer: MEDICARE

## 2022-10-14 ASSESSMENT — PATIENT HEALTH QUESTIONNAIRE - PHQ9
SUM OF ALL RESPONSES TO PHQ QUESTIONS 1-9: 14
5. POOR APPETITE OR OVEREATING: 2
6. FEELING BAD ABOUT YOURSELF - OR THAT YOU ARE A FAILURE OR HAVE LET YOURSELF OR YOUR FAMILY DOWN: 2
4. FEELING TIRED OR HAVING LITTLE ENERGY: 3
SUM OF ALL RESPONSES TO PHQ QUESTIONS 1-9: 14
SUM OF ALL RESPONSES TO PHQ9 QUESTIONS 1 & 2: 5
7. TROUBLE CONCENTRATING ON THINGS, SUCH AS READING THE NEWSPAPER OR WATCHING TELEVISION: 0
3. TROUBLE FALLING OR STAYING ASLEEP: 2
2. FEELING DOWN, DEPRESSED OR HOPELESS: 2
9. THOUGHTS THAT YOU WOULD BE BETTER OFF DEAD, OR OF HURTING YOURSELF: 0
SUM OF ALL RESPONSES TO PHQ QUESTIONS 1-9: 14
1. LITTLE INTEREST OR PLEASURE IN DOING THINGS: 3
SUM OF ALL RESPONSES TO PHQ QUESTIONS 1-9: 14
10. IF YOU CHECKED OFF ANY PROBLEMS, HOW DIFFICULT HAVE THESE PROBLEMS MADE IT FOR YOU TO DO YOUR WORK, TAKE CARE OF THINGS AT HOME, OR GET ALONG WITH OTHER PEOPLE: 1
8. MOVING OR SPEAKING SO SLOWLY THAT OTHER PEOPLE COULD HAVE NOTICED. OR THE OPPOSITE, BEING SO FIGETY OR RESTLESS THAT YOU HAVE BEEN MOVING AROUND A LOT MORE THAN USUAL: 0

## 2022-10-14 NOTE — FLOWSHEET NOTE
10/14/22 0755   PHQ-2 Over the past 2 weeks, how often have you been bothered by any of the following problems? Little interest or pleasure in doing things 3   Feeling down, depressed, or hopeless 2   PHQ-2 Score 5   PHQ-9 Over the past 2 weeks, how often have you been bothered by any of the following problems? Trouble falling or staying asleep, or sleeping too much 2   Feeling tired or having little energy 3   Poor appetite or overeating 2   Feeling bad about yourself - or that you are a failure or have let yourself or your family down 2   Trouble concentrating on things, such as reading the newspaper or watching television 0   Moving or speaking so slowly that other people could have noticed. Or the opposite - being so fidgety or restless that you have been moving around a lot more than usual 0   Thoughts that you would be better off dead, or of hurting yourself in some way 0   If you checked off any problems, how difficult have these problems made it for you to do your work, take care of things at home, or get along with other people? 1   PHQ-9 Total Score 14   Patient's score requires MD be notified.

## 2022-10-14 NOTE — PLAN OF CARE
Individual Treatment Plan  Cardiac Rehabilitation    Discharge   Name: Chuck Bailey Admit to Rehab: 10/14/2022   : 1955 Primary Diagnosis: Valve    Age: 75 y.o. Referring Physician:  Shankar Salazar   MRN: [id number] Primary Care Physician: PROVIDER UNKNOWN, MD     Allergies   Allergen Reactions    Eggs Or Egg-Derived Products     Other      Pt states she is not lactose intolerant, she is allergic to dairy. Exercise Assessment  Stages of Change: Action   Risk Stratification: Medium    Fall Risk: Low  Assistive Devices:None  Atrial Fibrillation  Initial Assessment 6 Minute Walk Test feet:     Post 6 minute walk test:   Pre-Test Vitals: Data Measured Before Walk  Heart Rate: 66  Blood Pressure: 108/60  O2 Saturation: 100  O2 Device: Room air;       Peak Vitals: Data Measured Immediately After Walk  Distance Walked (ft): 1150 ft  Peak Heart Rate: 107  Peak Blood Pressure: 122/62  RPE: 14/20  Met: 2.2       During:     Number of Rest: 0    Post-Test Vitals: Data Measured at 5 Minutes After Walk  Heart Rate: 66  Blood Pressure: 102/68  O2 Device: Room air  Pre 6 minute walk test feet: 345 ft  Post 6 Minute Walk Test feet: 1150    Key Anti-Hypertensive Meds            metoprolol succinate (TOPROL XL) 50 MG extended release tablet    Sig - Route: Take 1 tablet by mouth in the morning. - Oral             Exercise Prescription        Mode: . Treadmill, Bike (Upright or recumbent), NuStep, Airdyne, Arm Ergometer, Recumbent Eliptical, and Walking      Frequency: 2-3 days/week supervised      Intensity:RPE 11-14      Current Target Heart Rate:       Duration: 30+ minutes/day      Resistance Training: Yes, 3 days per week      Progression:Increase exercise by 0.5 METs every 1 to 2 weeks to goal of 4 to 6 METs, Increase exercise by 5 minutes every week to goal of 30 to 50 minutes, Increase resistance by 1 to 3 pounds every 1 to 2 weeks to goal of returned baseline strength level, and Increase repetitions from 10 to 15 and/or sets from 1 to 3      Supplemental oxygen:No  Max Met Level:     Plan  Home Exercise: Yes  Mode:Walking  Resistance Training:No    Target Goals  Adhere to cardiac exercise guidelines , Adhere to independent aerobic home exercise program, [facility], fitness center , Increase exercise endurance and stamina , Increase functional capacity as measured by a 6 minute walk or shuttle walk , and Participate in strength training   Education  Ms. Franny Caldwell was educated on the importance of   exercise safety, Importance of physical activity and exercise progression, home exercise program, low sodium diet, blood pressure education, and blood pressure medication    Nutrition Assessment  Stages of Change: Action            Nutrition Survey: Rate Your Plate Score: Rate Your Plate Total Score: 56     Patient Weight Goal: 100 lb   Recommended Diet:minimize processed foods, reduce sodium intake, minimize simple sugars, and increase fiber intake  Diabetic:No  Key Antihyperglycemic Medications       Patient is on no antihyperglycemic meds. Key Hyperlipidemia Meds            atorvastatin (LIPITOR) 40 MG tablet    Sig - Route: Take 1 tablet by mouth nightly - Oral                 Lab Results   Component Value Date    GLUCOSE 93 06/09/2022    LABA1C 5.6 04/06/2022    CHOL 185 03/31/2022    HDL 70 (H) 03/31/2022    LDLCALC 102 (H) 03/31/2022    TRIG 64 03/31/2022          Nutrition Intervention  Attend education classes related to nutrition, Clinician/Patient discussion, and Complete diet survey    Target Goals  Articulate heart healthy diet , Complete cardiac diet classes , and Maintain weight     Education  Ms. Franny Caldwell was educated on the importance ofmaintaining optimal weight/BMI, nutrition guidelines, and cholesterol reduction    Psychosocial Assessment  Stages of Change:Action  Social History     Socioeconomic History    Marital status: Single     Spouse name: Not on file    Number of children: Not on file    Years of education: Not on file    Highest education level: Not on file   Occupational History    Not on file   Tobacco Use    Smoking status: Never    Smokeless tobacco: Never   Vaping Use    Vaping Use: Never used   Substance and Sexual Activity    Alcohol use: No    Drug use: No    Sexual activity: Not on file   Other Topics Concern    Not on file   Social History Narrative    Not on file     Social Determinants of Health     Financial Resource Strain: Not on file   Food Insecurity: Not on file   Transportation Needs: Not on file   Physical Activity: Not on file   Stress: Not on file   Social Connections: Not on file   Intimate Partner Violence: Not on file   Housing Stability: Not on file          Psychosocial Test  Tool Used:  Today's PHQ:    PHQ Scores 10/14/2022 7/14/2022   PHQ2 Score 5 5   PHQ9 Score 14 14     Interpretation of Total Score Depression Severity: 1-4 = Minimal depression, 5-9 = Mild depression, 10-14 = Moderate depression, 15-19 = Moderately severe depression, 20-27 = Severe depression    Reports psychosocial symptoms:Yes  Currently on medication therapy:No  Currently seeing a mental health professional:No  Positive support system:Yes    Psychosocial Intervention  Attend education classes related to stress management and relaxation, Learn and utilize stress management and coping skills, and Participate in an exercise program that improves psychosocial symptoms    Target Goals  Return to ADLs/desired activities  and Maximize stress management/coping skills     Education Assessment  Stages of Change: Action  Barriers to Learning: No barriers  Personal Learning Style:Verbal and Video  Social History     Tobacco Use   Smoking Status Never   Smokeless Tobacco Never       Education Intervention/Learning Needs Identified  Blood pressure, Cholesterol, Exercise, Medications, Nutrition, Risk factor for CAD, Signs and symptoms of MI/CVA, Stregnth training, Stress management and relaxation, and Weight management    Target Goals  Articulate understanding of self-management and prevention/treatment of cardiac disease , Attend appropriate web education classes , and Restore to highest level of independent functionality     Education  Ms. Phuong Maravilla was educated on the importance of healthy coping techniques and stress management and relaxation techniques                             Provider Review and Approval of this ITP    The above treatment plan and goals have been set for your patient during Cardiac Rehabilitation. Please review and Electronically Cosign/ or Sign (if Fax Provider)            *Please comment and make changes to the EX RX or treatment plan if needed*                       Electronic Provider comment:              Please indicate with Cosign Comment.                    Electronically signed by Lissett Angel RN on 10/14/22 at 7:32 AM EDT

## 2022-10-17 ENCOUNTER — APPOINTMENT (OUTPATIENT)
Dept: CARDIAC REHAB | Age: 67
End: 2022-10-17
Payer: MEDICARE

## 2022-10-19 ENCOUNTER — APPOINTMENT (OUTPATIENT)
Dept: CARDIAC REHAB | Age: 67
End: 2022-10-19
Payer: MEDICARE

## 2022-10-21 ENCOUNTER — APPOINTMENT (OUTPATIENT)
Dept: CARDIAC REHAB | Age: 67
End: 2022-10-21
Payer: MEDICARE

## 2022-10-24 ENCOUNTER — APPOINTMENT (OUTPATIENT)
Dept: CARDIAC REHAB | Age: 67
End: 2022-10-24
Payer: MEDICARE

## 2022-10-26 ENCOUNTER — APPOINTMENT (OUTPATIENT)
Dept: CARDIAC REHAB | Age: 67
End: 2022-10-26
Payer: MEDICARE

## 2022-10-28 ENCOUNTER — APPOINTMENT (OUTPATIENT)
Dept: CARDIAC REHAB | Age: 67
End: 2022-10-28
Payer: MEDICARE

## 2022-10-31 ENCOUNTER — APPOINTMENT (OUTPATIENT)
Dept: CARDIAC REHAB | Age: 67
End: 2022-10-31
Payer: MEDICARE

## 2022-12-02 DIAGNOSIS — I48.91 ATRIAL FIBRILLATION WITH RVR (HCC): ICD-10-CM

## 2022-12-02 RX ORDER — METOPROLOL SUCCINATE 50 MG/1
50 TABLET, EXTENDED RELEASE ORAL DAILY
Qty: 90 TABLET | Refills: 3 | Status: SHIPPED | OUTPATIENT
Start: 2022-12-02 | End: 2023-04-01

## 2022-12-06 DIAGNOSIS — I48.91 ATRIAL FIBRILLATION WITH RVR (HCC): ICD-10-CM

## 2022-12-08 NOTE — TELEPHONE ENCOUNTER
Please schedule pt first available OV with EP NP for med refills, then route back to board. Thank you!      Last OV AGK 7/26/22 - pt plan: f/u in 6 months

## 2022-12-09 NOTE — TELEPHONE ENCOUNTER
12/09/22-Called phone number [phone], no answer, lm for pt to return call to [facility] to schedule a follow up/med refill appt with EP NP at next available. Will route back to the board once appt has been made. Thank you.

## 2022-12-13 RX ORDER — DIGOXIN 125 MCG
125 TABLET ORAL DAILY
Qty: 30 TABLET | Refills: 2 | Status: SHIPPED | OUTPATIENT
Start: 2022-12-13

## 2022-12-13 NOTE — TELEPHONE ENCOUNTER
Pt returned call. Message given to schedule with epnp. Pt stated that she has an at home nurse practitioner that she will be following up with due to it being a free visit for her. Pt was advised that we would not be filling medications without follow up ov's.

## 2022-12-13 NOTE — TELEPHONE ENCOUNTER
12/13-Called ([phone]) unable to make contact, LM for pt to return call. A letter has been mail to listed address on file.

## 2023-01-10 DIAGNOSIS — I48.91 ATRIAL FIBRILLATION WITH RVR (HCC): ICD-10-CM

## 2023-03-07 DIAGNOSIS — I48.91 ATRIAL FIBRILLATION WITH RVR (HCC): ICD-10-CM

## 2023-03-08 RX ORDER — DIGOXIN 125 MCG
TABLET ORAL
Qty: 90 TABLET | Refills: 0 | Status: SHIPPED | OUTPATIENT
Start: 2023-03-08

## 2023-03-08 NOTE — TELEPHONE ENCOUNTER
Last ov 07/26/2022:  RECOMMENDATIONS:  Discussed at length treatment options for AF:   1. Medications to slow heart rate (goal of less than 90 at rest, less than 110 with mild activity)   2. Medications for rhythm control (amiodarone, dronedarone, or dofetilide which requires hospital admission for three days). Discussed risks and benefits. 3. Ablation to burn the abnormal electrical activity within the heart. Discussed risks and benefits. 4. Pace and Ablate to put pacemaker in the heart and burn the electrical activity in the heart. Would be dependent upon pacemaker for the rest of your life. This would be a last resort. 5. Cardioversion to electrically shock heart back into normal rhythm. 2.  Continue oral anticoagulant to prevent stroke in the presence of AF. 3.  Think about your options and let us know how you would like to            proceed. 4.  Follow up in 6 months.      Front- pt needs appt

## 2023-03-08 NOTE — TELEPHONE ENCOUNTER
Pt called stating she would prefer DR. Jay Miranda to continue refilling her cardiac medications bc at her last visit with [facility] she stated she was given options to handle her afib and she stated she did not like the options given and only wants to see DEVAN. I scheduled pt for a followup with DEVAN for 4/20/23. Please advise this message and how to handle her digoxin refill. She will be out of medication by this Dieudonne 3/12/23.

## 2023-04-20 ENCOUNTER — OFFICE VISIT (OUTPATIENT)
Dept: CARDIOLOGY CLINIC | Age: 68
End: 2023-04-20
Payer: MEDICARE

## 2023-04-20 ENCOUNTER — PATIENT MESSAGE (OUTPATIENT)
Dept: CARDIOLOGY CLINIC | Age: 68
End: 2023-04-20

## 2023-04-20 VITALS
BODY MASS INDEX: 20.01 KG/M2 | DIASTOLIC BLOOD PRESSURE: 62 MMHG | HEIGHT: 61 IN | SYSTOLIC BLOOD PRESSURE: 116 MMHG | OXYGEN SATURATION: 97 % | HEART RATE: 79 BPM | WEIGHT: 106 LBS

## 2023-04-20 DIAGNOSIS — I48.19 PERSISTENT ATRIAL FIBRILLATION (HCC): ICD-10-CM

## 2023-04-20 DIAGNOSIS — I42.8 NON-ISCHEMIC CARDIOMYOPATHY (HCC): ICD-10-CM

## 2023-04-20 DIAGNOSIS — I34.0 NONRHEUMATIC MITRAL VALVE REGURGITATION: ICD-10-CM

## 2023-04-20 DIAGNOSIS — I50.23 ACUTE ON CHRONIC SYSTOLIC CONGESTIVE HEART FAILURE (HCC): Primary | ICD-10-CM

## 2023-04-20 DIAGNOSIS — R06.02 SOB (SHORTNESS OF BREATH): ICD-10-CM

## 2023-04-20 PROBLEM — I42.9 CARDIOMYOPATHY (HCC): Status: ACTIVE | Noted: 2023-04-20

## 2023-04-20 PROCEDURE — 99214 OFFICE O/P EST MOD 30 MIN: CPT | Performed by: INTERNAL MEDICINE

## 2023-04-20 PROCEDURE — 1123F ACP DISCUSS/DSCN MKR DOCD: CPT | Performed by: INTERNAL MEDICINE

## 2023-04-20 RX ORDER — FUROSEMIDE 20 MG/1
20 TABLET ORAL DAILY
Qty: 30 TABLET | Refills: 5 | Status: SHIPPED | OUTPATIENT
Start: 2023-04-20

## 2023-04-20 RX ORDER — ASPIRIN 325 MG
325 TABLET ORAL DAILY
COMMUNITY

## 2023-04-20 NOTE — PROGRESS NOTES
CARDIOLOGY FOLLOW UP        Patient Name: Kayy Herrmann  Primary Care physician: Elizabeth Roblero MD    Reason for Referral/Chief Complaint: Kayy Herrmann is a 70 y.o. patient who presents to cardiology clinic today for follow up for non ischemic cardiomyopathy, CHF, MVR. History of Present Illness:   Kayy Herrmann 70 y.o. with a prior medical history notable for congestive heart failure, cardiomyopathy, mitral regurgitation status post mitral valve repair and atrial fibrillation. Admitted 3/31/2022 with AF with rapid ventricular response. She was found to be in new onset CHF. Echo on 03/31/2022 EF 55%. Severely thickened mitral valve leaflets. The posterior mitral valve leaflet was flail with likely ruptured chordae, resulting in severe eccentric, anteriorly-directed mitral regurgitation. Cardiac Cath Normal coronaries. XAVIER confirmed Severe eccentric MR due to flail PMVL. 4/06/2022 she underwent minimal access MV repair w/ ring, right-sided MAZE, ISAAC clip, cryo. pt was noted in persistent atrial fibrillation post-op. 6/09/2022 attempted cardioversion was unsuccessful. Patient was referred to electrophysiology for consideration for anti-arrhythmic drug therapy and repeat DCCV attempt versus ablation for persistent, symptomatic, AF. Today she reports \"lightheadedness\". Denies near-syncope or bharati syncope. She states it occurs about 1 hour after taking her morning medications. She states she checks her pulse and it is in the 40's during those episodes. States slowly goes up and feels better. She states that she recently did a \"cologuard\" test and this was positive. She did have blood in her stool. Since resolved. Her PCP advised she gets a colonoscopy. She reports she currently is taking Asprin 325 mg (3 tabs at a time). She states this is the only med that helps with her headache. This is in addition to her [facility].  She states she is short of breath with walking and especially inclines and

## 2023-04-20 NOTE — PATIENT INSTRUCTIONS
PLAN:  You have to stop taking the Aspirin 325 mg (3 tabs)   Discuss with your PCP what medication would help with your headaches  Labs - CBC, BMP, BNP, Digoxin level  Start Lasix (furosemide) 20 mg once every morning  (start this tomorrow morning)   Discussion regarding ablation.   Not ready to proceed at this time

## 2023-04-21 DIAGNOSIS — I48.19 PERSISTENT ATRIAL FIBRILLATION (HCC): ICD-10-CM

## 2023-04-21 DIAGNOSIS — I34.0 NONRHEUMATIC MITRAL VALVE REGURGITATION: ICD-10-CM

## 2023-04-21 DIAGNOSIS — R06.02 SOB (SHORTNESS OF BREATH): ICD-10-CM

## 2023-04-21 LAB
BASOPHILS # BLD: 0 K/UL (ref 0–0.2)
BASOPHILS NFR BLD: 0.5 %
DEPRECATED RDW RBC AUTO: 13.9 % (ref 12.4–15.4)
EOSINOPHIL # BLD: 0.1 K/UL (ref 0–0.6)
EOSINOPHIL NFR BLD: 2 %
HCT VFR BLD AUTO: 38.1 % (ref 36–48)
HGB BLD-MCNC: 12.9 G/DL (ref 12–16)
LYMPHOCYTES # BLD: 1.2 K/UL (ref 1–5.1)
LYMPHOCYTES NFR BLD: 17.2 %
MCH RBC QN AUTO: 31.3 PG (ref 26–34)
MCHC RBC AUTO-ENTMCNC: 33.8 G/DL (ref 31–36)
MCV RBC AUTO: 92.6 FL (ref 80–100)
MONOCYTES # BLD: 0.6 K/UL (ref 0–1.3)
MONOCYTES NFR BLD: 8.9 %
NEUTROPHILS # BLD: 5 K/UL (ref 1.7–7.7)
NEUTROPHILS NFR BLD: 71.4 %
PLATELET # BLD AUTO: 268 K/UL (ref 135–450)
PMV BLD AUTO: 8.2 FL (ref 5–10.5)
RBC # BLD AUTO: 4.12 M/UL (ref 4–5.2)
WBC # BLD AUTO: 7 K/UL (ref 4–11)

## 2023-04-21 NOTE — TELEPHONE ENCOUNTER
From: Jamie Cervantes  To: Dr. Nanette Fuentes: 4/20/2023 8:25 PM EDT  Subject: re: blood tests    I went to 2 different labs on Thursday after leaving your office but both were closed for the day already. I will go on Friday to get the tests done.

## 2023-04-22 LAB
ANION GAP SERPL CALCULATED.3IONS-SCNC: 9 MMOL/L (ref 3–16)
BUN SERPL-MCNC: 11 MG/DL (ref 7–20)
CALCIUM SERPL-MCNC: 9.3 MG/DL (ref 8.3–10.6)
CHLORIDE SERPL-SCNC: 99 MMOL/L (ref 99–110)
CO2 SERPL-SCNC: 29 MMOL/L (ref 21–32)
CREAT SERPL-MCNC: 0.7 MG/DL (ref 0.6–1.2)
DIGOXIN SERPL-MCNC: 0.8 NG/ML (ref 0.8–2)
GFR SERPLBLD CREATININE-BSD FMLA CKD-EPI: >60 ML/MIN/{1.73_M2}
GLUCOSE SERPL-MCNC: 76 MG/DL (ref 70–99)
NT-PROBNP SERPL-MCNC: 2223 PG/ML (ref 0–124)
POTASSIUM SERPL-SCNC: 4.6 MMOL/L (ref 3.5–5.1)
SODIUM SERPL-SCNC: 137 MMOL/L (ref 136–145)

## 2023-04-24 ENCOUNTER — TELEPHONE (OUTPATIENT)
Dept: CARDIOLOGY CLINIC | Age: 68
End: 2023-04-24

## 2023-04-24 DIAGNOSIS — Z79.899 MEDICATION MANAGEMENT: Primary | ICD-10-CM

## 2023-04-24 DIAGNOSIS — I42.8 OTHER CARDIOMYOPATHIES (HCC): ICD-10-CM

## 2023-04-24 NOTE — TELEPHONE ENCOUNTER
Called and spoke with pt, her weight has consistently stayed at 106lbs. She is still having sob, but does not notice any edema, (pt stated she never noticed edema before lasix).

## 2023-04-24 NOTE — TELEPHONE ENCOUNTER
----- Message from Leah Henderson MD sent at 4/24/2023  8:54 AM EDT -----  Please let Cha Gunter know I reviewed her labs, elevated proBNP reflecting increased volume in the body, electrolytes and kidney function are stable. She should have started Lasix, we can get an update on how she is doing with that. Digoxin level is normal.  I will continue digoxin for now if heart rates are not consistently dropping low.   Blood counts normal.

## 2023-04-24 NOTE — TELEPHONE ENCOUNTER
Pt returned call. Message given. V/u. Pt started lasix on 04/21/23 and has not noticed a difference since starting but will call if anything changes.

## 2023-04-28 DIAGNOSIS — I42.8 OTHER CARDIOMYOPATHIES (HCC): ICD-10-CM

## 2023-04-28 DIAGNOSIS — Z79.899 MEDICATION MANAGEMENT: ICD-10-CM

## 2023-04-29 LAB
ANION GAP SERPL CALCULATED.3IONS-SCNC: 11 MMOL/L (ref 3–16)
BUN SERPL-MCNC: 17 MG/DL (ref 7–20)
CALCIUM SERPL-MCNC: 9.5 MG/DL (ref 8.3–10.6)
CHLORIDE SERPL-SCNC: 97 MMOL/L (ref 99–110)
CO2 SERPL-SCNC: 30 MMOL/L (ref 21–32)
CREAT SERPL-MCNC: 0.8 MG/DL (ref 0.6–1.2)
GFR SERPLBLD CREATININE-BSD FMLA CKD-EPI: >60 ML/MIN/{1.73_M2}
GLUCOSE SERPL-MCNC: 106 MG/DL (ref 70–99)
NT-PROBNP SERPL-MCNC: 1407 PG/ML (ref 0–124)
POTASSIUM SERPL-SCNC: 4.9 MMOL/L (ref 3.5–5.1)
SODIUM SERPL-SCNC: 138 MMOL/L (ref 136–145)

## 2023-05-01 ENCOUNTER — TELEPHONE (OUTPATIENT)
Dept: CARDIOLOGY CLINIC | Age: 68
End: 2023-05-01

## 2023-05-01 NOTE — TELEPHONE ENCOUNTER
----- Message from Ellie Sherman MD sent at 5/1/2023 12:23 PM EDT -----  Electrolytes including potassium look good. Kidney function stable. Fluid level is decreased from 10 days ago nicely. Continue current medications.

## 2023-05-03 DIAGNOSIS — I48.91 ATRIAL FIBRILLATION WITH RVR (HCC): ICD-10-CM

## 2023-05-09 RX ORDER — RIVAROXABAN 20 MG/1
TABLET, FILM COATED ORAL
Qty: 30 TABLET | Refills: 3 | OUTPATIENT
Start: 2023-05-09

## 2023-05-30 NOTE — PROGRESS NOTES
7. Ignacio SVR: 1571 d/s                   8. Transpulmonary gradient: 5       9. Ignacio PVR: 1 wood unit              B. Opening arterial pressure: 103/67/82              C. LVEDP: 15 mmhg  PA sat 71%  PW sat 95%  RA sat 70%  Ao sat 97% (on NRB at time of draw). 2.  Coronary anatomy:  A. Left main artery: The left main artery bifurcates into the left anterior descending artery and left circumflex artery. The left main artery was normal.   B. Left anterior descending artery: Transapical vessel which gives rise to 2 diagonal arteries. The left anterior descending artery had no disease. Diminutive vessel distally. The diagonal arteries had no disease. C. Left circumflex artery: Non-dominant vessel that gives rise to 2 obtuse marginal arteries. The left circumflex artery had no disease. The first obtuse marginal artery had no diease. The second marginal artery was tortuous with no disease. D. Right coronary artery: Large Dominant vessel that gives rise to the posterior descending artery and posterolateral branch. The right coronary artery had no disease. The posterior descending artery had no disease. The posterolateral branch had no disease. XAVIER: 4/1/22  Summary   The left ventricle appears mildly dilated with preserved systolic function. Ejection fraction is visually estimated at 55%. Normal right ventricular size and function. Gigantic left atrium. No left atrial/left atrial appendage thrombus. Flail posterior mitral valve leaflet with ruptured chordae seen prolapsing   into Left atrium. Severe anterior-directed, eccentric mitral regurgitation. Trace aortic regurgitation. Mild tricuspid regurgitation. Mild pulmonic regurgitation present. A bubble study was performed and showed delayed evidence of right to left   shunt consistent with a small intrapulmonary shunt. Echo:3/31/2022  Summary   Irregular heart rate throughout study.    Normal left ventricle systolic function

## 2023-05-31 ENCOUNTER — OFFICE VISIT (OUTPATIENT)
Dept: CARDIOLOGY CLINIC | Age: 68
End: 2023-05-31
Payer: MEDICARE

## 2023-05-31 VITALS
WEIGHT: 106.5 LBS | HEART RATE: 65 BPM | SYSTOLIC BLOOD PRESSURE: 104 MMHG | OXYGEN SATURATION: 95 % | DIASTOLIC BLOOD PRESSURE: 60 MMHG | BODY MASS INDEX: 20.12 KG/M2

## 2023-05-31 DIAGNOSIS — I50.23 ACUTE ON CHRONIC SYSTOLIC CONGESTIVE HEART FAILURE (HCC): ICD-10-CM

## 2023-05-31 DIAGNOSIS — R53.82 CHRONIC FATIGUE: ICD-10-CM

## 2023-05-31 DIAGNOSIS — I48.19 PERSISTENT ATRIAL FIBRILLATION (HCC): ICD-10-CM

## 2023-05-31 DIAGNOSIS — I42.8 NON-ISCHEMIC CARDIOMYOPATHY (HCC): Primary | ICD-10-CM

## 2023-05-31 DIAGNOSIS — I34.0 NONRHEUMATIC MITRAL VALVE REGURGITATION: ICD-10-CM

## 2023-05-31 DIAGNOSIS — E78.2 MIXED HYPERLIPIDEMIA: ICD-10-CM

## 2023-05-31 PROCEDURE — 1123F ACP DISCUSS/DSCN MKR DOCD: CPT | Performed by: NURSE PRACTITIONER

## 2023-05-31 PROCEDURE — 99214 OFFICE O/P EST MOD 30 MIN: CPT | Performed by: NURSE PRACTITIONER

## 2023-05-31 RX ORDER — METOPROLOL SUCCINATE 25 MG/1
25 TABLET, EXTENDED RELEASE ORAL DAILY
Qty: 90 TABLET | Refills: 3 | Status: SHIPPED | OUTPATIENT
Start: 2023-05-31

## 2023-05-31 RX ORDER — ATORVASTATIN CALCIUM 20 MG/1
20 TABLET, FILM COATED ORAL NIGHTLY
Qty: 90 TABLET | Refills: 2 | Status: SHIPPED | OUTPATIENT
Start: 2023-05-31

## 2023-06-05 DIAGNOSIS — I48.91 ATRIAL FIBRILLATION WITH RVR (HCC): ICD-10-CM

## 2023-06-05 RX ORDER — DIGOXIN 125 MCG
125 TABLET ORAL DAILY
Qty: 90 TABLET | Refills: 2 | Status: SHIPPED | OUTPATIENT
Start: 2023-06-05

## 2023-06-05 NOTE — TELEPHONE ENCOUNTER
Pt is requesting refill of   digoxin (LANOXIN) 125 MCG tablet    Preferred pharmacy is   [facility] [id number] [facility] [address] - P [phone]        Last ov 05/31/2023 npde. Upcoming ov 08/24/2023 npde.

## 2023-08-23 NOTE — PROGRESS NOTES
(right atrial pressure 8 mmHg)   There is a small right pleural effusion. No prior studies available for comparison. Assessment:    1. Non-ischemic cardiomyopathy ([address])  -last LVEF 45% on echo in April 2022; improved to 55% on echo in May 2023  -appears to be euvolemic on exam  -continue lasix and Toprol  -2/2/ valvular disease    2. Persistent atrial fibrillation (HCC)  -VR controlled  -continue Toprol  -continue Xarelto    3. Nonrheumatic mitral valve regurgitation  -H/O mitral regurgitation  -S/P MV repair (2/2 flail PMVL)  -mild MR on echo in May 2023    4. Mixed hyperlipidemia  -continue statin    5. Chronic systolic  heart failure (HCC)  -euvolemic on exam  -continue lasix, Toprol, digoxin  -last LVEF improved to 55%  -low sodium diet and fluid restriction    Plan:  Continue digoxin, lasix, Xarelto, statin, Toprol  Discussed low-fat/low sodium diet, monitoring of daily weights, fluid restriction, worsening signs and symptoms of heart failure and when to call, and the importance of regular exercise and activity. Discussed results of echo from May 2023; answered several questions regarding treatment for her atrial fib  Follow up with DENP in 6 months or sooner if needed    Return in about 6 months (around 2/25/2024) for or sooner if needed. Thanks for allowing me to participate in the care of this patient.       Elsa Norlander, APRN-CNP  [address] [place] [address],[address]  Office: [phone]  Fax: [phone]      Electronically signed by CHARLES Garcia CNP on 8/25/2023 at 5:13 PM

## 2023-08-25 ENCOUNTER — OFFICE VISIT (OUTPATIENT)
Dept: CARDIOLOGY CLINIC | Age: 68
End: 2023-08-25
Payer: MEDICARE

## 2023-08-25 VITALS
DIASTOLIC BLOOD PRESSURE: 50 MMHG | SYSTOLIC BLOOD PRESSURE: 100 MMHG | BODY MASS INDEX: 19.92 KG/M2 | HEART RATE: 76 BPM | WEIGHT: 105.5 LBS | HEIGHT: 61 IN | OXYGEN SATURATION: 95 %

## 2023-08-25 DIAGNOSIS — I34.0 NONRHEUMATIC MITRAL VALVE REGURGITATION: ICD-10-CM

## 2023-08-25 DIAGNOSIS — E78.2 MIXED HYPERLIPIDEMIA: ICD-10-CM

## 2023-08-25 DIAGNOSIS — I48.19 PERSISTENT ATRIAL FIBRILLATION (HCC): ICD-10-CM

## 2023-08-25 DIAGNOSIS — I42.8 NON-ISCHEMIC CARDIOMYOPATHY (HCC): Primary | ICD-10-CM

## 2023-08-25 DIAGNOSIS — I50.22 CHRONIC SYSTOLIC HEART FAILURE (HCC): ICD-10-CM

## 2023-08-25 PROCEDURE — 99213 OFFICE O/P EST LOW 20 MIN: CPT | Performed by: NURSE PRACTITIONER

## 2023-08-25 PROCEDURE — 1123F ACP DISCUSS/DSCN MKR DOCD: CPT | Performed by: NURSE PRACTITIONER

## 2023-08-25 RX ORDER — FLUOXETINE 10 MG/1
CAPSULE ORAL
COMMUNITY
Start: 2023-08-19

## 2023-10-09 DIAGNOSIS — I50.23 ACUTE ON CHRONIC SYSTOLIC CONGESTIVE HEART FAILURE (HCC): ICD-10-CM

## 2023-10-10 RX ORDER — FUROSEMIDE 20 MG/1
20 TABLET ORAL DAILY
Qty: 30 TABLET | Refills: 3 | Status: SHIPPED | OUTPATIENT
Start: 2023-10-10

## 2024-02-07 DIAGNOSIS — I50.23 ACUTE ON CHRONIC SYSTOLIC CONGESTIVE HEART FAILURE (HCC): ICD-10-CM

## 2024-02-08 RX ORDER — FUROSEMIDE 20 MG/1
20 TABLET ORAL DAILY
Qty: 30 TABLET | Refills: 0 | Status: SHIPPED | OUTPATIENT
Start: 2024-02-08

## 2024-02-29 ENCOUNTER — PATIENT MESSAGE (OUTPATIENT)
Dept: CARDIOLOGY CLINIC | Age: 69
End: 2024-02-29

## 2024-03-01 NOTE — TELEPHONE ENCOUNTER
From: Luisa Miranda  To: Diane M Enzweiler  Sent: 2/29/2024 11:25 PM EST  Subject: Need refills    I am out of refills of Digoxin-- and only have 6 pills left. This website says I can't request more refills through this site of it and some of the other meds I'm on. Why not? Should I just stop taking the meds when I run out of them?

## 2024-03-01 NOTE — TELEPHONE ENCOUNTER
8/25/2023 NPDE- follow up in 6 months  No upcoming appt  4/28/2023 BMP  4/21/2023 digoxin level      Please contact pt for appt per NPDE, once scheduled will send refill as requested.

## 2024-03-04 DIAGNOSIS — I48.91 ATRIAL FIBRILLATION WITH RVR (HCC): ICD-10-CM

## 2024-03-05 RX ORDER — DIGOXIN 125 MCG
125 TABLET ORAL DAILY
Qty: 90 TABLET | Refills: 0 | Status: SHIPPED | OUTPATIENT
Start: 2024-03-05

## 2024-03-10 DIAGNOSIS — I50.23 ACUTE ON CHRONIC SYSTOLIC CONGESTIVE HEART FAILURE (HCC): ICD-10-CM

## 2024-03-11 RX ORDER — FUROSEMIDE 20 MG/1
20 TABLET ORAL DAILY
Qty: 30 TABLET | Refills: 0 | Status: SHIPPED | OUTPATIENT
Start: 2024-03-11

## 2024-03-15 NOTE — PROGRESS NOTES
[facility]  Office Visit    Luisa Mrianda  1955 March 19, 2024    CC:   Chief Complaint   Patient presents with    6 Month Follow-Up    Non-ischemic cardiomyopathy (HCC)    Shortness of Breath     HPI:  The patient is 69 y.o. female with a past medical history significant for nonischemic cardiomyopathy, CHF, atrial fibrillation, HLP and S/P mitral valve repair , mitral regurgitation here for follow up. She has previously had ASA  discontinued in setting of OAC and prior GI bleeding. She has been followed in the office and last seen in August 2023.    She was admitted in March 2022 with rapid atrial fib and new onset CHF. Echo showed LVEF 55%, posterior MV leaflet flail with likely ruptured chordae. Caridac cath demonstrated normal coronaries. XAVIER confirmed severe eccentric MR d/t flail leaflet. On 4/6/2022 she underwent minimal access MV repair with ring, right sided MAZE and ISAAC clip and cryo. Noted  to have persistent atrial fib post op and DCCV on 6/9/2022 was unsuccessful. She was referred to EP at that time for further consideration of therapy and has refused ablation.    Overall remains status quo. She has chronic SOB with exertion and at rest. Has not changed over last few years. At times she feels her heart racing. Denies chest pain/discomfort, orthopnea/PND, cough, palpitations,syncope, edema , weight change or claudication. Has dizziness within 1 hour after taking metoprolol and lasts ~ 1 hour and resolves. Activity limited d/t fatigue and SOB (she paces her home activity). She has \"funky feeling\" in her head that occurs at times, is quite transient and feels it is related to her HR in the 50's. No syncope/near syncope    Review of Systems:  Constitutional: Denies  weakness, night sweats or fever. + chronic fatigue  HEENT: Denies new visual changes, ringing in ears, nosebleeds,nasal congestion  Respiratory: + chronic SOB  Cardiovascular: see HPI  GI: Denies N/V, diarrhea,

## 2024-03-19 ENCOUNTER — OFFICE VISIT (OUTPATIENT)
Dept: CARDIOLOGY CLINIC | Age: 69
End: 2024-03-19
Payer: MEDICARE

## 2024-03-19 VITALS
WEIGHT: 107 LBS | DIASTOLIC BLOOD PRESSURE: 60 MMHG | BODY MASS INDEX: 20.2 KG/M2 | OXYGEN SATURATION: 95 % | HEART RATE: 72 BPM | HEIGHT: 61 IN | SYSTOLIC BLOOD PRESSURE: 114 MMHG

## 2024-03-19 DIAGNOSIS — Z98.890 S/P MVR (MITRAL VALVE REPAIR): ICD-10-CM

## 2024-03-19 DIAGNOSIS — I34.0 NONRHEUMATIC MITRAL VALVE REGURGITATION: ICD-10-CM

## 2024-03-19 DIAGNOSIS — E78.2 MIXED HYPERLIPIDEMIA: ICD-10-CM

## 2024-03-19 DIAGNOSIS — I42.8 NON-ISCHEMIC CARDIOMYOPATHY (HCC): Primary | ICD-10-CM

## 2024-03-19 DIAGNOSIS — I48.19 PERSISTENT ATRIAL FIBRILLATION (HCC): ICD-10-CM

## 2024-03-19 PROCEDURE — 1123F ACP DISCUSS/DSCN MKR DOCD: CPT | Performed by: NURSE PRACTITIONER

## 2024-03-19 PROCEDURE — 99214 OFFICE O/P EST MOD 30 MIN: CPT | Performed by: NURSE PRACTITIONER

## 2024-03-19 NOTE — PATIENT INSTRUCTIONS
Continue same medications    Lab work: CMP and lipids (fasting labs)    Call [phone] ([phone]) to schedule  -call and schedule echocardiogram (ultrasound of your heart)

## 2024-03-20 DIAGNOSIS — Z98.890 S/P MVR (MITRAL VALVE REPAIR): ICD-10-CM

## 2024-03-20 DIAGNOSIS — E78.2 MIXED HYPERLIPIDEMIA: ICD-10-CM

## 2024-03-20 DIAGNOSIS — I42.8 NON-ISCHEMIC CARDIOMYOPATHY (HCC): ICD-10-CM

## 2024-03-20 LAB
ALBUMIN SERPL-MCNC: 4.3 G/DL (ref 3.4–5)
ALBUMIN/GLOB SERPL: 1.3 {RATIO} (ref 1.1–2.2)
ALP SERPL-CCNC: 75 U/L (ref 40–129)
ALT SERPL-CCNC: 11 U/L (ref 10–40)
ANION GAP SERPL CALCULATED.3IONS-SCNC: 6 MMOL/L (ref 3–16)
AST SERPL-CCNC: 17 U/L (ref 15–37)
BILIRUB SERPL-MCNC: 0.4 MG/DL (ref 0–1)
BUN SERPL-MCNC: 14 MG/DL (ref 7–20)
CALCIUM SERPL-MCNC: 9.7 MG/DL (ref 8.3–10.6)
CHLORIDE SERPL-SCNC: 99 MMOL/L (ref 99–110)
CHOLEST SERPL-MCNC: 141 MG/DL (ref 0–199)
CO2 SERPL-SCNC: 32 MMOL/L (ref 21–32)
CREAT SERPL-MCNC: 0.8 MG/DL (ref 0.6–1.2)
GFR SERPLBLD CREATININE-BSD FMLA CKD-EPI: >60 ML/MIN/{1.73_M2}
GLUCOSE SERPL-MCNC: 91 MG/DL (ref 70–99)
HDLC SERPL-MCNC: 61 MG/DL (ref 40–60)
LDLC SERPL CALC-MCNC: 62 MG/DL
POTASSIUM SERPL-SCNC: 4.6 MMOL/L (ref 3.5–5.1)
PROT SERPL-MCNC: 7.5 G/DL (ref 6.4–8.2)
SODIUM SERPL-SCNC: 137 MMOL/L (ref 136–145)
TRIGL SERPL-MCNC: 88 MG/DL (ref 0–150)
VLDLC SERPL CALC-MCNC: 18 MG/DL

## 2024-03-21 ENCOUNTER — TELEPHONE (OUTPATIENT)
Dept: CARDIOLOGY CLINIC | Age: 69
End: 2024-03-21

## 2024-03-21 NOTE — TELEPHONE ENCOUNTER
----- Message from Diane M Enzweiler, APRN - CNP sent at 3/20/2024  8:44 PM EDT -----  CMP and lipids reviewed:  1. CMP: looks good! Electrolytes, kidney function and liver enzymes all look good!  2. Lipids: look good and at LDL goal.     Continue same meds.  Please call. Thanks!

## 2024-03-26 DIAGNOSIS — I48.91 ATRIAL FIBRILLATION WITH RVR (HCC): ICD-10-CM

## 2024-03-26 RX ORDER — ATORVASTATIN CALCIUM 20 MG/1
20 TABLET, FILM COATED ORAL NIGHTLY
Qty: 90 TABLET | Refills: 2 | Status: SHIPPED | OUTPATIENT
Start: 2024-03-26

## 2024-03-28 NOTE — TELEPHONE ENCOUNTER
Pt returned call pt states she had just had an OV NPDD, and future OV w/ RJM. Does not want to continue care w/ AGK.

## 2024-04-09 DIAGNOSIS — I50.23 ACUTE ON CHRONIC SYSTOLIC CONGESTIVE HEART FAILURE (HCC): ICD-10-CM

## 2024-04-10 ENCOUNTER — HOSPITAL ENCOUNTER (OUTPATIENT)
Dept: CARDIOLOGY | Age: 69
Discharge: HOME | End: 2024-04-10
Payer: MEDICARE

## 2024-04-10 DIAGNOSIS — I42.8 NON-ISCHEMIC CARDIOMYOPATHY (HCC): ICD-10-CM

## 2024-04-10 DIAGNOSIS — Z98.890 S/P MVR (MITRAL VALVE REPAIR): ICD-10-CM

## 2024-04-10 DIAGNOSIS — I34.0 NONRHEUMATIC MITRAL VALVE REGURGITATION: ICD-10-CM

## 2024-04-10 PROCEDURE — 93308 TTE F-UP OR LMTD: CPT

## 2024-04-10 RX ORDER — FUROSEMIDE 20 MG/1
20 TABLET ORAL DAILY
Qty: 90 TABLET | Refills: 2 | Status: SHIPPED | OUTPATIENT
Start: 2024-04-10

## 2024-04-11 ENCOUNTER — TELEPHONE (OUTPATIENT)
Dept: CARDIOLOGY CLINIC | Age: 69
End: 2024-04-11

## 2024-04-11 NOTE — TELEPHONE ENCOUNTER
----- Message from Diane M Enzweiler, APRN - CNP sent at 4/10/2024  3:11 PM EDT -----  Please call Luisa and inform her that her echo is okay. Her LVEF is > 65%; recommend decreasing Lanoxin to every other day and monitor HR. If heart rate remains controlled and under 90 bpm, recommend discontinuing it and monitor. Her heart pump is working a little too hard. Continue Toprol as this will help relax her heart and allow it to work more efficiently.     Her mitral valve appears to be intact and ring well seated with trivial mitral regurgitation and other leaks through aortic and tricuspid valves are not of concern. Continue same medications.    Thanks!

## 2024-05-15 DIAGNOSIS — K21.9 GASTROESOPHAGEAL REFLUX DISEASE WITHOUT ESOPHAGITIS: ICD-10-CM

## 2024-05-16 DIAGNOSIS — K21.9 GASTROESOPHAGEAL REFLUX DISEASE WITHOUT ESOPHAGITIS: ICD-10-CM

## 2024-05-17 RX ORDER — FAMOTIDINE 20 MG/1
20 TABLET, FILM COATED ORAL 2 TIMES DAILY
Qty: 30 TABLET | Refills: 0 | OUTPATIENT
Start: 2024-05-17

## 2024-05-18 DIAGNOSIS — K21.9 GASTROESOPHAGEAL REFLUX DISEASE WITHOUT ESOPHAGITIS: ICD-10-CM

## 2024-05-20 RX ORDER — FAMOTIDINE 20 MG/1
20 TABLET, FILM COATED ORAL 2 TIMES DAILY
Qty: 180 TABLET | Refills: 1 | Status: SHIPPED | OUTPATIENT
Start: 2024-05-20

## 2024-05-20 NOTE — TELEPHONE ENCOUNTER
Pt called requesting refills on famotidine (PEPCID) 20 MG tablet [[id number]]    , please send to   [facility] [id number] - [place], [place] - [address] - P [phone] - F [phone] [address] [address]  Phone: [phone]  Fax: [phone]

## 2024-05-22 RX ORDER — METOPROLOL SUCCINATE 25 MG/1
25 TABLET, EXTENDED RELEASE ORAL DAILY
Qty: 90 TABLET | Refills: 3 | Status: SHIPPED | OUTPATIENT
Start: 2024-05-22

## 2024-05-22 RX ORDER — METOPROLOL SUCCINATE 25 MG/1
25 TABLET, EXTENDED RELEASE ORAL DAILY
Qty: 90 TABLET | Refills: 3 | OUTPATIENT
Start: 2024-05-22

## 2024-05-22 RX ORDER — FAMOTIDINE 20 MG/1
20 TABLET, FILM COATED ORAL 2 TIMES DAILY
Qty: 180 TABLET | Refills: 3 | OUTPATIENT
Start: 2024-05-22

## 2024-05-23 NOTE — TELEPHONE ENCOUNTER
Handouts given to pt:  physical handout        I recommend signing up for MyChart.    Labs:    You can use the lab in our building when fasting. The hrs are: Monday-Friday, 7 a.m. - 5 p.m., Saturday 8 a.m. - 12 noon.   No appt needed, BUT YOU DO NEED THE PAPER ORDER.    Fasting is no food, drink, gum or mints other than water for 12 hrs.   Results will be back in 2-3 business days for most labs. It is always recommended for any orders (labs, xrays, ultrasounds,MRI, ct scan, procedures etc) to check with your insurance provider for expected costs or expenses to you.         You will get your results via phone from my medical assistant if you do not have MyChart.  OR  You will get your results via Video Recru[employer]art    If a result is urgent, I will call to speak to you.    Vaccines:  Up to date      General recommendations:  Exercise-cardio 4-5d/wk 30min each day  Diet-Breakfast-toast (my favorite Nicole Chau Delighful Multigrain or Yovani's Killer Bread Good Seed thin-sliced)/bagel/English muffin-whole wheat flour as a 1st ingredient or cereal/oatmeal/granola bar-fiber 4g or more or protein like eggs or peanut butter; optional veggies  Lunch-protein, 1/2c carb or 2 slices bread, veg 1c  Dinner-protein, fist sized carb, veg 1c  Fruit 2 a day  Dairy 2 a day-milk, soy milk, almond milk, cheese, yogurt, cottage cheese  Snacks-Protein-hard boiled egg, nuts (walnuts/almonds/pecans/pistachios 1/4c), hummus, beef/deer jerky or meat sticks; vegetable, fruit, dairy-milk(1%, skim, almond, soy)/cheese (not a lot of cheddar)/yogurt (Greek is best-my favorite Dannon Fruit on the Bottom Greek)/cottage cheese 2%; triscuits/ popcorn/wheat thins have a lot of fiber; follow serving size on bag/box/container  increase water  Limit alcohol to 1 drink per day for women and 2 drinks per day for men (1 drink=12oz beer or 5oz wine or 1 1/2oz liquor)  Calcium: 500mg 1 twice a day if age 50 and younger and 600mg 1 twice a day if over age 50 (calcium  LMOM for pt to contact the office.    citrate can be taken without food)  Vitamin D: 800-5000 IU/day  Limit salt to <2300mg a day if age 50 and under and <1500mg a day if over age 50/have high bp or diabetes or kidney disease  Recommend folate for childbearing age women 0.4mg per day (can be found in a multivitamin)  Recommend 18mg/dL of iron a day if age 50 and under and 8mg/dL a day if over age 50; take on an empty stomach at bedtime  Use sunscreen   Wear seatbelt  Recommend safe sex practices: using condoms everytime you have sex, discuss with a new partner about their past partners/history of STDs/drug use, avoid drinking alcohol or using drugs as this increases the chance that you will participate in high-risk sex, for oral sex help protect your mouth by having your partner use a condom (male or female), women should not douche after sex, be aware of your partner's body and your body-look for signs of a sore, blister, rash, or discharge, and have regular exams and periodic tests for STDs.  No distracted driving  No driving when under influence of substances  Wear a seatbelt  Eye dr every 1-2yrs  Dentist every 6-12 mon  Tetanus shot every 10yrs  Recommend flu vaccine in the fall  Appt in 1 year for physical      I will communicate with you via Casenet regarding messages and results. If you need help with this, you can call the support line at [phone].    IT WAS A PLEASURE TO SEE YOU TODAY. THANK YOU FOR CHOOSING US FOR YOUR HEALTHCARE NEEDS.

## 2024-07-24 ENCOUNTER — PATIENT MESSAGE (OUTPATIENT)
Dept: CARDIOLOGY CLINIC | Age: 69
End: 2024-07-24

## 2024-07-24 DIAGNOSIS — I48.91 ATRIAL FIBRILLATION WITH RVR (HCC): ICD-10-CM

## 2024-07-25 NOTE — TELEPHONE ENCOUNTER
From: Luisa Miranda  To: Dr. Laith Ordaz  Sent: 7/24/2024 10:38 PM EDT  Subject: Xarelto refills    Can you please call in refills on my Xarelto prescription to the oger in [facility]? Thank you   [Fatigue] : no fatigue [Blurred Vision] : no blurred vision [Chest Pain] : no chest pain [Shortness Of Breath] : no shortness of breath [Nausea] : no nausea [Diarrhea] : no diarrhea [Gas/Bloating] : no gas/bloating [Polyuria] : no polyuria

## 2024-09-17 ENCOUNTER — HOSPITAL ENCOUNTER (OUTPATIENT)
Dept: GENERAL RADIOLOGY | Age: 69
Discharge: HOME | End: 2024-09-17
Payer: MEDICARE

## 2024-09-17 ENCOUNTER — OFFICE VISIT (OUTPATIENT)
Dept: CARDIOLOGY CLINIC | Age: 69
End: 2024-09-17
Payer: MEDICARE

## 2024-09-17 ENCOUNTER — HOSPITAL ENCOUNTER (OUTPATIENT)
Age: 69
Discharge: HOME | End: 2024-09-17
Payer: MEDICARE

## 2024-09-17 VITALS
WEIGHT: 106 LBS | DIASTOLIC BLOOD PRESSURE: 52 MMHG | SYSTOLIC BLOOD PRESSURE: 110 MMHG | HEART RATE: 83 BPM | HEIGHT: 61 IN | BODY MASS INDEX: 20.01 KG/M2 | OXYGEN SATURATION: 96 %

## 2024-09-17 DIAGNOSIS — I34.0 NONRHEUMATIC MITRAL VALVE REGURGITATION: Primary | ICD-10-CM

## 2024-09-17 DIAGNOSIS — R06.02 SOB (SHORTNESS OF BREATH): ICD-10-CM

## 2024-09-17 DIAGNOSIS — R05.8 OTHER COUGH: ICD-10-CM

## 2024-09-17 DIAGNOSIS — I48.19 PERSISTENT ATRIAL FIBRILLATION (HCC): ICD-10-CM

## 2024-09-17 DIAGNOSIS — I42.8 NICM (NONISCHEMIC CARDIOMYOPATHY) (HCC): ICD-10-CM

## 2024-09-17 PROCEDURE — 1123F ACP DISCUSS/DSCN MKR DOCD: CPT | Performed by: INTERNAL MEDICINE

## 2024-09-17 PROCEDURE — 99214 OFFICE O/P EST MOD 30 MIN: CPT | Performed by: INTERNAL MEDICINE

## 2024-09-17 PROCEDURE — 71046 X-RAY EXAM CHEST 2 VIEWS: CPT

## 2024-09-17 RX ORDER — BUPROPION HYDROCHLORIDE 75 MG/1
75 TABLET ORAL 2 TIMES DAILY
COMMUNITY
Start: 2024-08-09

## 2024-09-18 ENCOUNTER — TELEPHONE (OUTPATIENT)
Dept: CARDIOLOGY CLINIC | Age: 69
End: 2024-09-18

## 2024-09-18 DIAGNOSIS — I49.9 IRREGULAR HEART BEAT: ICD-10-CM

## 2024-09-18 DIAGNOSIS — R93.89 ABNORMAL CHEST XRAY: ICD-10-CM

## 2024-09-18 DIAGNOSIS — I48.0 PAROXYSMAL ATRIAL FIBRILLATION (HCC): Primary | ICD-10-CM

## 2024-09-19 ENCOUNTER — TELEPHONE (OUTPATIENT)
Dept: CARDIOLOGY CLINIC | Age: 69
End: 2024-09-19

## 2024-09-19 ENCOUNTER — ANCILLARY PROCEDURE (OUTPATIENT)
Dept: CARDIOLOGY CLINIC | Age: 69
End: 2024-09-19
Payer: MEDICARE

## 2024-09-19 DIAGNOSIS — I49.9 IRREGULAR HEART BEAT: ICD-10-CM

## 2024-09-19 DIAGNOSIS — I48.0 PAROXYSMAL ATRIAL FIBRILLATION (HCC): ICD-10-CM

## 2024-10-04 ENCOUNTER — TELEPHONE (OUTPATIENT)
Dept: CARDIOLOGY CLINIC | Age: 69
End: 2024-10-04

## 2024-10-04 DIAGNOSIS — I48.91 ATRIAL FIBRILLATION WITH RVR (HCC): ICD-10-CM

## 2024-10-04 PROCEDURE — 93228 REMOTE 30 DAY ECG REV/REPORT: CPT | Performed by: INTERNAL MEDICINE

## 2024-10-04 NOTE — TELEPHONE ENCOUNTER
Medication Refill    Medication needing refilled: rivaroxaban (XARELTO) 20 MG TABS tablet [[id number]]        Dosage of the medication: 20mg    How are you taking this medication (QD, BID, TID, QID, PRN):   Sig: Take 1 tablet by mouth daily              30 or 90 day supply called in: 90    When will you run out of your medication:     Which Pharmacy are we sending the medication to?:    [facility] FAX [phone] CB# [phone]

## 2024-11-13 ENCOUNTER — OFFICE VISIT (OUTPATIENT)
Dept: PULMONOLOGY | Age: 69
End: 2024-11-13
Payer: MEDICARE

## 2024-11-13 VITALS
HEIGHT: 61 IN | BODY MASS INDEX: 20.39 KG/M2 | DIASTOLIC BLOOD PRESSURE: 72 MMHG | OXYGEN SATURATION: 97 % | SYSTOLIC BLOOD PRESSURE: 110 MMHG | RESPIRATION RATE: 16 BRPM | TEMPERATURE: 97.3 F | WEIGHT: 108 LBS | HEART RATE: 104 BPM

## 2024-11-13 DIAGNOSIS — R93.89 ABNORMAL CXR: ICD-10-CM

## 2024-11-13 DIAGNOSIS — R06.02 SOB (SHORTNESS OF BREATH): Primary | ICD-10-CM

## 2024-11-13 DIAGNOSIS — R05.3 CHRONIC COUGH: ICD-10-CM

## 2024-11-13 PROCEDURE — 99204 OFFICE O/P NEW MOD 45 MIN: CPT | Performed by: STUDENT IN AN ORGANIZED HEALTH CARE EDUCATION/TRAINING PROGRAM

## 2024-11-13 PROCEDURE — 1159F MED LIST DOCD IN RCRD: CPT | Performed by: STUDENT IN AN ORGANIZED HEALTH CARE EDUCATION/TRAINING PROGRAM

## 2024-11-13 PROCEDURE — 1123F ACP DISCUSS/DSCN MKR DOCD: CPT | Performed by: STUDENT IN AN ORGANIZED HEALTH CARE EDUCATION/TRAINING PROGRAM

## 2024-11-13 RX ORDER — DOXYCYCLINE HYCLATE 100 MG
100 TABLET ORAL 2 TIMES DAILY
Qty: 10 TABLET | Refills: 0 | Status: SHIPPED | OUTPATIENT
Start: 2024-11-13 | End: 2024-11-18

## 2024-11-13 RX ORDER — PREDNISONE 20 MG/1
20 TABLET ORAL DAILY
Qty: 5 TABLET | Refills: 0 | Status: SHIPPED | OUTPATIENT
Start: 2024-11-13 | End: 2024-11-18

## 2024-11-13 RX ORDER — LEVALBUTEROL INHALATION SOLUTION 1.25 MG/3ML
1.25 SOLUTION RESPIRATORY (INHALATION) EVERY 4 HOURS PRN
Qty: 1 EACH | Refills: 3 | Status: SHIPPED | OUTPATIENT
Start: 2024-11-13

## 2024-11-13 ASSESSMENT — ENCOUNTER SYMPTOMS
VOMITING: 0
ABDOMINAL DISTENTION: 0
EYE DISCHARGE: 0
TROUBLE SWALLOWING: 0
BACK PAIN: 0
EYE PAIN: 0
ABDOMINAL PAIN: 0
WHEEZING: 0
EYE REDNESS: 0
COLOR CHANGE: 0
DIARRHEA: 0
EYE ITCHING: 0
SORE THROAT: 0
SHORTNESS OF BREATH: 1
NAUSEA: 0
CONSTIPATION: 0
STRIDOR: 0
COUGH: 1

## 2024-11-13 NOTE — PROGRESS NOTES
MA Communication:  The following orders are received by verbal communication from Monster Baires MD    Orders include:    PFT and follow up in 6 weeks  Patient will order nebulizer from [facility] and will call if she needs a script sent somewhere else.         
file   Tobacco Use    Smoking status: Never    Smokeless tobacco: Never   Vaping Use    Vaping status: Never Used   Substance and Sexual Activity    Alcohol use: No    Drug use: No    Sexual activity: Not on file   Other Topics Concern    Not on file   Social History Narrative    Not on file     Social Determinants of Health     Financial Resource Strain: Not on file   Food Insecurity: Not on file   Transportation Needs: Not on file   Physical Activity: Not on file   Stress: Not on file   Social Connections: Not on file   Intimate Partner Violence: Not on file   Housing Stability: Not on file       Family History   Problem Relation Age of Onset    Cancer Father     Cancer Paternal Aunt     Cancer Paternal Grandmother         Review of Systems: CompleteReview of system reviewed with patient and noted on attached review of system sheet.     Review of Systems   Constitutional:  Negative for activity change, appetite change, chills, diaphoresis and fatigue.   HENT:  Negative for congestion, sore throat and trouble swallowing.    Eyes:  Negative for pain, discharge, redness and itching.   Respiratory:  Positive for cough and shortness of breath. Negative for wheezing and stridor.    Cardiovascular:  Negative for chest pain, palpitations and leg swelling.   Gastrointestinal:  Negative for abdominal distention, abdominal pain, constipation, diarrhea, nausea and vomiting.   Endocrine: Negative for polydipsia, polyphagia and polyuria.   Genitourinary:  Negative for difficulty urinating.   Musculoskeletal:  Negative for back pain, myalgias and neck pain.   Skin:  Negative for color change.   Neurological:  Negative for dizziness, weakness and light-headedness.   Psychiatric/Behavioral:  Negative for agitation and behavioral problems.        Physical Exam:  Vitals:    11/13/24 1444   BP: 110/72   Pulse: (!) 104   Resp: 16   Temp: 97.3 °F (36.3 °C)   SpO2: 97%        Physical Exam  Constitutional:       General: She is not in

## 2024-11-13 NOTE — PATIENT INSTRUCTIONS
Remember to bring a list of pulmonary medications and any CPAP or BiPAP machines to your next appointment with the office.     Please keep all of your future appointments scheduled by [facility] office. Out of respect for other patients and providers, you may be asked to reschedule your appointment if you arrive later than your scheduled appointment time. Appointments cancelled less than 24hrs in advance will be considered a no show. Patients with three missed appointments within 1 year or four missed appointments within 2 years can be dismissed from the practice.     Please be aware that our physicians are required to work in the Intensive Care Unit at [facility].  Your appointment may need to be rescheduled if they are designated to work during your appointment time.      You may receive a survey regarding the care you received during your visit.  Your input is valuable to us.  We encourage you to complete and return your survey.  We hope you will choose us in the future for your healthcare needs.     Pt instructed of all future appointment dates & times, including radiology, labs, procedures & referrals. If procedures were scheduled preparation instructions provided. Instructions on future appointments with [facility] were given.

## 2024-11-15 SDOH — HEALTH STABILITY: PHYSICAL HEALTH: ON AVERAGE, HOW MANY DAYS PER WEEK DO YOU ENGAGE IN MODERATE TO STRENUOUS EXERCISE (LIKE A BRISK WALK)?: 0 DAYS

## 2024-11-18 ENCOUNTER — TELEPHONE (OUTPATIENT)
Dept: PULMONOLOGY | Age: 69
End: 2024-11-18

## 2024-11-18 ENCOUNTER — OFFICE VISIT (OUTPATIENT)
Dept: FAMILY MEDICINE CLINIC | Age: 69
End: 2024-11-18
Payer: MEDICARE

## 2024-11-18 VITALS
RESPIRATION RATE: 18 BRPM | BODY MASS INDEX: 20.43 KG/M2 | DIASTOLIC BLOOD PRESSURE: 60 MMHG | HEART RATE: 85 BPM | WEIGHT: 108.2 LBS | TEMPERATURE: 98.8 F | OXYGEN SATURATION: 98 % | SYSTOLIC BLOOD PRESSURE: 103 MMHG | HEIGHT: 61 IN

## 2024-11-18 DIAGNOSIS — R06.02 SOB (SHORTNESS OF BREATH): ICD-10-CM

## 2024-11-18 DIAGNOSIS — I51.7 CARDIOMEGALY: ICD-10-CM

## 2024-11-18 DIAGNOSIS — M85.89 OSTEOPENIA OF MULTIPLE SITES: ICD-10-CM

## 2024-11-18 DIAGNOSIS — E03.9 ACQUIRED HYPOTHYROIDISM: ICD-10-CM

## 2024-11-18 DIAGNOSIS — K21.9 GASTROESOPHAGEAL REFLUX DISEASE WITHOUT ESOPHAGITIS: ICD-10-CM

## 2024-11-18 DIAGNOSIS — I48.19 PERSISTENT ATRIAL FIBRILLATION (HCC): Primary | ICD-10-CM

## 2024-11-18 DIAGNOSIS — I25.5 ISCHEMIC CARDIOMYOPATHY: ICD-10-CM

## 2024-11-18 DIAGNOSIS — F33.0 MILD EPISODE OF RECURRENT MAJOR DEPRESSIVE DISORDER (HCC): ICD-10-CM

## 2024-11-18 PROCEDURE — 1123F ACP DISCUSS/DSCN MKR DOCD: CPT | Performed by: FAMILY MEDICINE

## 2024-11-18 PROCEDURE — 99204 OFFICE O/P NEW MOD 45 MIN: CPT | Performed by: FAMILY MEDICINE

## 2024-11-18 PROCEDURE — 1159F MED LIST DOCD IN RCRD: CPT | Performed by: FAMILY MEDICINE

## 2024-11-18 PROCEDURE — 1160F RVW MEDS BY RX/DR IN RCRD: CPT | Performed by: FAMILY MEDICINE

## 2024-11-18 RX ORDER — FAMOTIDINE 20 MG/1
20 TABLET, FILM COATED ORAL 2 TIMES DAILY
Qty: 180 TABLET | Refills: 3 | Status: SHIPPED | OUTPATIENT
Start: 2024-11-18

## 2024-11-18 RX ORDER — BUPROPION HYDROCHLORIDE 150 MG/1
150 TABLET ORAL EVERY MORNING
Qty: 30 TABLET | Refills: 2 | Status: SHIPPED | OUTPATIENT
Start: 2024-11-18

## 2024-11-18 ASSESSMENT — PATIENT HEALTH QUESTIONNAIRE - PHQ9: DEPRESSION UNABLE TO ASSESS: PT REFUSES

## 2024-11-18 NOTE — TELEPHONE ENCOUNTER
Her insurance will not cover [employer] but she found a place that will take her insurance and it's [facility] in [place], please send order there. She would prefer the one that goes over nose and mouth please. Thanks

## 2024-11-18 NOTE — PROGRESS NOTES
[facility]  Date of Encounter: 2024     Luisa Miranda (: 1955) is a 69 y.o. female who presents today for:   Chief Complaint   Patient presents with    New Patient     New to Provider    Establish Care    Cough     Chronic       Establishing care today.     Taking famotidine to help protect stomach, no heartburn symptoms on this (previously took tums prn).    Taking wellbutrin 75 BID- helping some with mood. No SEs.   Failed: Prozac (hand tremor), zoloft (night sweats)      Positive cologuard 2023- declining colonoscopy  Osteopenia on DEXA 2023- normally taking calcium vitamin D supplement, declined bisphosphonate     AWV completed 2024 at [facility] with last PCP:  Check labs - will call results  Continue working on diet and physical activity as tolerated  Continue current medications  Trial of Wellbutrin -- 75mg BID. If doing well after a month, will change to 150mg XL dosing  Check with insurance on asthma/allergy groups - [facility], Dr. Armenta with [facility], or [facility]  F/u in 1 year for CPE or sooner if needed   1. Medicare annual wellness visit, subsequent (Primary)  2. PAF (paroxysmal atrial fibrillation) (CMS HCC)  3. Non-ischemic cardiomyopathy (CMS HCC)  4. Nonrheumatic mitral valve regurgitation  5. Hypothyroidism, unspecified type  - levothyroxine; Take 1 Tablet (25 mcg) by mouth daily. Dispense: 90 Tablet; Refill: 0  - Tsh 3rd gen, reflex ft4; Future  - CBC with differential; Future  6. Hyperlipidemia, unspecified hyperlipidemia type     AF - stable on beta blocker and Xarelto. She states she's in it all the time and followed by cardiology. Denies bleeding issues on Xarelto.  Non-ischemic cardiomyopathy - 2/2 valvular disease. Most recent Echo showed EF >65% in April and was taken off dig at that time. Remains on Lasix and metoprolol and followed by cardiology (sees in Sept).  Mitral valve regurg s/p repair - stable; followed by

## 2024-11-19 DIAGNOSIS — R06.02 SOB (SHORTNESS OF BREATH): Primary | ICD-10-CM

## 2024-11-19 DIAGNOSIS — R93.89 ABNORMAL CXR: ICD-10-CM

## 2024-11-19 DIAGNOSIS — R05.3 CHRONIC COUGH: ICD-10-CM

## 2024-12-30 ENCOUNTER — HOSPITAL ENCOUNTER (OUTPATIENT)
Dept: PULMONOLOGY | Age: 69
Discharge: HOME | End: 2024-12-30
Payer: MEDICARE

## 2024-12-30 ENCOUNTER — OFFICE VISIT (OUTPATIENT)
Dept: PULMONOLOGY | Age: 69
End: 2024-12-30
Payer: MEDICARE

## 2024-12-30 VITALS
TEMPERATURE: 97.2 F | HEIGHT: 62 IN | RESPIRATION RATE: 16 BRPM | BODY MASS INDEX: 20.06 KG/M2 | WEIGHT: 109 LBS | DIASTOLIC BLOOD PRESSURE: 77 MMHG | HEART RATE: 110 BPM | OXYGEN SATURATION: 95 % | SYSTOLIC BLOOD PRESSURE: 121 MMHG

## 2024-12-30 VITALS — OXYGEN SATURATION: 95 %

## 2024-12-30 DIAGNOSIS — R05.3 CHRONIC COUGH: ICD-10-CM

## 2024-12-30 DIAGNOSIS — R06.02 SOB (SHORTNESS OF BREATH): Primary | ICD-10-CM

## 2024-12-30 DIAGNOSIS — R93.89 ABNORMAL CXR: ICD-10-CM

## 2024-12-30 LAB
DLCO %PRED: 68 %
DLCO PRED: NORMAL
DLCO/VA %PRED: NORMAL
DLCO/VA PRED: NORMAL
DLCO/VA: NORMAL
DLCO: NORMAL
EXPIRATORY TIME-POST: NORMAL
EXPIRATORY TIME: NORMAL
FEF 25-75 %CHNG: NORMAL
FEF 25-75 POST %PRED: NORMAL
FEF 25-75% %PRED-PRE: NORMAL
FEF 25-75% PRED: NORMAL
FEF 25-75-POST: NORMAL
FEF 25-75-PRE: NORMAL
FEV1 %PRED-POST: 60 %
FEV1 %PRED-PRE: 54 %
FEV1 PRED: NORMAL
FEV1-POST: NORMAL
FEV1-PRE: NORMAL
FEV1/FVC %PRED-POST: 87 %
FEV1/FVC %PRED-PRE: 85 %
FEV1/FVC PRED: NORMAL
FEV1/FVC-POST: NORMAL
FEV1/FVC-PRE: NORMAL
FVC %PRED-POST: 68 L
FVC %PRED-PRE: 63 %
FVC PRED: NORMAL
FVC-POST: NORMAL
FVC-PRE: NORMAL
GAW %PRED: NORMAL
GAW PRED: NORMAL
GAW: NORMAL
IC PRE %PRED: NORMAL
IC PRED: NORMAL
IC: NORMAL
MEP: NORMAL
MIP: NORMAL
MVV %PRED-PRE: NORMAL
MVV PRED: NORMAL
MVV-PRE: NORMAL
PEF %PRED-POST: NORMAL
PEF %PRED-PRE: NORMAL
PEF PRED: NORMAL
PEF%CHNG: NORMAL
PEF-POST: NORMAL
PEF-PRE: NORMAL
RAW %PRED: NORMAL
RAW PRED: NORMAL
RAW: NORMAL
RV PRE %PRED: NORMAL
RV PRED: NORMAL
RV: NORMAL
SVC %PRED: NORMAL
SVC PRED: NORMAL
SVC: NORMAL
TLC PRE %PRED: 104 %
TLC PRED: NORMAL
TLC: NORMAL
VA %PRED: NORMAL
VA PRED: NORMAL
VA: NORMAL
VTG %PRED: NORMAL
VTG PRED: NORMAL
VTG: NORMAL

## 2024-12-30 PROCEDURE — 94729 DIFFUSING CAPACITY: CPT

## 2024-12-30 PROCEDURE — 99213 OFFICE O/P EST LOW 20 MIN: CPT | Performed by: STUDENT IN AN ORGANIZED HEALTH CARE EDUCATION/TRAINING PROGRAM

## 2024-12-30 PROCEDURE — 1123F ACP DISCUSS/DSCN MKR DOCD: CPT | Performed by: STUDENT IN AN ORGANIZED HEALTH CARE EDUCATION/TRAINING PROGRAM

## 2024-12-30 PROCEDURE — 94760 N-INVAS EAR/PLS OXIMETRY 1: CPT

## 2024-12-30 PROCEDURE — 1159F MED LIST DOCD IN RCRD: CPT | Performed by: STUDENT IN AN ORGANIZED HEALTH CARE EDUCATION/TRAINING PROGRAM

## 2024-12-30 PROCEDURE — 94726 PLETHYSMOGRAPHY LUNG VOLUMES: CPT

## 2024-12-30 PROCEDURE — 6370000000 HC RX 637 (ALT 250 FOR IP): Performed by: STUDENT IN AN ORGANIZED HEALTH CARE EDUCATION/TRAINING PROGRAM

## 2024-12-30 PROCEDURE — 94060 EVALUATION OF WHEEZING: CPT

## 2024-12-30 PROCEDURE — G2211 COMPLEX E/M VISIT ADD ON: HCPCS | Performed by: STUDENT IN AN ORGANIZED HEALTH CARE EDUCATION/TRAINING PROGRAM

## 2024-12-30 RX ORDER — ALBUTEROL SULFATE 90 UG/1
4 INHALANT RESPIRATORY (INHALATION) ONCE
Status: COMPLETED | OUTPATIENT
Start: 2024-12-30 | End: 2024-12-30

## 2024-12-30 RX ORDER — DILTIAZEM HYDROCHLORIDE 60 MG/1
2 TABLET, FILM COATED ORAL 2 TIMES DAILY
Qty: 1 EACH | Refills: 1 | Status: SHIPPED | OUTPATIENT
Start: 2024-12-30 | End: 2024-12-31

## 2024-12-30 RX ADMIN — Medication 4 PUFF: at 14:27

## 2024-12-30 ASSESSMENT — ENCOUNTER SYMPTOMS
ABDOMINAL PAIN: 0
DIARRHEA: 0
SORE THROAT: 0
NAUSEA: 0
EYE REDNESS: 0
TROUBLE SWALLOWING: 0
EYE ITCHING: 0
ABDOMINAL DISTENTION: 0
WHEEZING: 0
COLOR CHANGE: 0
COUGH: 1
SHORTNESS OF BREATH: 0
EYE PAIN: 0
EYE DISCHARGE: 0
STRIDOR: 0
BACK PAIN: 0
VOMITING: 0
CONSTIPATION: 0

## 2024-12-30 ASSESSMENT — PULMONARY FUNCTION TESTS
FVC_PERCENT_PREDICTED_PRE: 63
FVC_PERCENT_PREDICTED_POST: 68
FEV1/FVC_PERCENT_PREDICTED_PRE: 85
FEV1_PERCENT_PREDICTED_PRE: 54
FEV1/FVC_PERCENT_PREDICTED_POST: 87
FEV1_PERCENT_PREDICTED_POST: 60

## 2024-12-30 NOTE — PROGRESS NOTES
[facility] Pulmonary Follow-up  [address] [place] [address]  [phone]        Luisa Miranda (: 1955 ) is a 69 y.o. female here for an evaluation of   Chief Complaint   Patient presents with    Follow-up    Shortness of Breath    Results     PFT         SUBJECTIVE/OBJECTIVE:  Patient is 69-year-old female with significant past medical history of CAD, hyperlipidemia that presents to [facility] pulmonary clinic for follow-up visit.  Patient is still having chronic cough.  She says that her symptoms improved in terms of productive sputum after steroids and antibiotic therapy.  However, she is still having cough.  She says that the nebulizer treatments are not helping.  She is here for follow-up of pulmonary function test.     Patient has a history of mitral valve repair and atrial fibrillation.  She also has cardiomegaly that is seen on CT imaging.     Patient is a never smoker, no illicit drug use, no alcohol use.  She denies any occupational exposures.  She has no pets/birds at home, she denies any household exposures.      Review of Systems   Constitutional:  Negative for activity change, appetite change, chills, diaphoresis and fatigue.   HENT:  Negative for congestion, sore throat and trouble swallowing.    Eyes:  Negative for pain, discharge, redness and itching.   Respiratory:  Positive for cough. Negative for shortness of breath, wheezing and stridor.    Cardiovascular:  Negative for chest pain, palpitations and leg swelling.   Gastrointestinal:  Negative for abdominal distention, abdominal pain, constipation, diarrhea, nausea and vomiting.   Endocrine: Negative for polydipsia, polyphagia and polyuria.   Genitourinary:  Negative for difficulty urinating.   Musculoskeletal:  Negative for back pain, myalgias and neck pain.   Skin:  Negative for color change.   Neurological:  Negative for dizziness, weakness and light-headedness.   Psychiatric/Behavioral:  Negative for agitation and

## 2024-12-30 NOTE — PATIENT INSTRUCTIONS
Remember to bring a list of pulmonary medications and any CPAP or BiPAP machines to your next appointment with the office.     Please keep all of your future appointments scheduled by [facility] [place] [facility] office. Out of respect for other patients and providers, you may be asked to reschedule your appointment if you arrive later than your scheduled appointment time. Appointments cancelled less than 24hrs in advance will be considered a no show. Patients with three missed appointments within 1 year or four missed appointments within 2 years can be dismissed from the practice.     Please be aware that our physicians are required to work in the Intensive Care Unit at [facility].  Your appointment may need to be rescheduled if they are designated to work during your appointment time.      You may receive a survey regarding the care you received during your visit.  Your input is valuable to us.  We encourage you to complete and return your survey.  We hope you will choose us in the future for your healthcare needs.     Pt instructed of all future appointment dates & times, including radiology, labs, procedures & referrals. If procedures were scheduled preparation instructions provided. Instructions on future appointments with [facility] were given.     In the next few weeks, you will be receiving a survey from [facility] regarding your visit today.  We would greatly appreciate it if you would take just a few minutes to fill that out.  It is very important to us that our patients receive top notch care and our surveys help keep us accountable. However, if your experience was not a good one, we want to hear about that as well. This is a key way we can keep track of problems and strive to correct any for future visits.    Again, we appreciate your time and thank you for choosing [facility]

## 2024-12-30 NOTE — PROGRESS NOTES
MA Communication:  The following orders are received by verbal communication from   Monster Baires MD    Orders include:  6 WK FU

## 2024-12-31 RX ORDER — BUDESONIDE AND FORMOTEROL FUMARATE DIHYDRATE 80; 4.5 UG/1; UG/1
2 AEROSOL RESPIRATORY (INHALATION) 2 TIMES DAILY
Qty: 10.2 G | Refills: 1 | OUTPATIENT
Start: 2024-12-31

## 2024-12-31 NOTE — PROCEDURES
Pulmonary Function Testing      Patient name:  Luisa Miranda      Unit #:   [id number]   Date of test:  12/30/2024   Date of interpretation:   12/31/2024    Ms. Luisa Miranda is a 69 y.o. year-old non smoker. The spirometry data were acceptable and reproducible.     Spirometry:  Flow volume loops were normal. The FEV-1/FVC ratio was normal. The FEV-1 was moderate. The FVC was decreased. Response to inhaled bronchodilators (albuterol) was not significant.    Lung volumes:  Lung volumes were tested by plethysmography. The total lung capacity was normal. The residual volume was increased. The ratio of residual volume to total lung capacity (RV/TLC) was 160%, which was increased.     Diffusion capacity was found to be 68% which is Mildly decreased.      Interpretation:  Nonspecific ventilatory defect with no significant bronchodilator response.  The diffusion is mildly reduced.  There is air trapping, which may be suggestive of an obstructive component    Comments: None      Mon[facility] Baires MD, [facility]  [facility] Critical Care

## 2025-01-08 DIAGNOSIS — I50.23 ACUTE ON CHRONIC SYSTOLIC CONGESTIVE HEART FAILURE (HCC): ICD-10-CM

## 2025-01-09 RX ORDER — FUROSEMIDE 20 MG/1
20 TABLET ORAL DAILY
Qty: 90 TABLET | Refills: 2 | Status: SHIPPED | OUTPATIENT
Start: 2025-01-09

## 2025-01-09 NOTE — TELEPHONE ENCOUNTER
Last Office Visit: 9/17/2024 Provider: DEVAN  **Is provider OOT? No    Next Office Visit: 3/17/2025 Provider: JORDEN    LAST LABS:   CMP:   Lab Results   Component Value Date     03/20/2024    K 4.6 03/20/2024    CL 99 03/20/2024    CO2 32 03/20/2024    BUN 14 03/20/2024    CREATININE 0.8 03/20/2024    GLUCOSE 91 03/20/2024    CALCIUM 9.7 03/20/2024    BILITOT 0.4 03/20/2024    ALKPHOS 75 03/20/2024    AST 17 03/20/2024    ALT 11 03/20/2024    LABGLOM >60 03/20/2024    GFRAA >60 06/09/2022    AGRATIO 1.3 03/20/2024

## 2025-01-19 SDOH — ECONOMIC STABILITY: INCOME INSECURITY: IN THE LAST 12 MONTHS, WAS THERE A TIME WHEN YOU WERE NOT ABLE TO PAY THE MORTGAGE OR RENT ON TIME?: NO

## 2025-01-19 SDOH — ECONOMIC STABILITY: FOOD INSECURITY: WITHIN THE PAST 12 MONTHS, YOU WORRIED THAT YOUR FOOD WOULD RUN OUT BEFORE YOU GOT MONEY TO BUY MORE.: NEVER TRUE

## 2025-01-19 SDOH — ECONOMIC STABILITY: TRANSPORTATION INSECURITY
IN THE PAST 12 MONTHS, HAS THE LACK OF TRANSPORTATION KEPT YOU FROM MEDICAL APPOINTMENTS OR FROM GETTING MEDICATIONS?: NO

## 2025-01-19 SDOH — ECONOMIC STABILITY: FOOD INSECURITY: WITHIN THE PAST 12 MONTHS, THE FOOD YOU BOUGHT JUST DIDN'T LAST AND YOU DIDN'T HAVE MONEY TO GET MORE.: NEVER TRUE

## 2025-01-19 SDOH — ECONOMIC STABILITY: TRANSPORTATION INSECURITY
IN THE PAST 12 MONTHS, HAS LACK OF TRANSPORTATION KEPT YOU FROM MEETINGS, WORK, OR FROM GETTING THINGS NEEDED FOR DAILY LIVING?: NO

## 2025-01-19 ASSESSMENT — PATIENT HEALTH QUESTIONNAIRE - PHQ9
1. LITTLE INTEREST OR PLEASURE IN DOING THINGS: NEARLY EVERY DAY
1. LITTLE INTEREST OR PLEASURE IN DOING THINGS: NEARLY EVERY DAY
SUM OF ALL RESPONSES TO PHQ QUESTIONS 1-9: 9
8. MOVING OR SPEAKING SO SLOWLY THAT OTHER PEOPLE COULD HAVE NOTICED. OR THE OPPOSITE - BEING SO FIDGETY OR RESTLESS THAT YOU HAVE BEEN MOVING AROUND A LOT MORE THAN USUAL: NOT AT ALL
10. IF YOU CHECKED OFF ANY PROBLEMS, HOW DIFFICULT HAVE THESE PROBLEMS MADE IT FOR YOU TO DO YOUR WORK, TAKE CARE OF THINGS AT HOME, OR GET ALONG WITH OTHER PEOPLE: SOMEWHAT DIFFICULT
10. IF YOU CHECKED OFF ANY PROBLEMS, HOW DIFFICULT HAVE THESE PROBLEMS MADE IT FOR YOU TO DO YOUR WORK, TAKE CARE OF THINGS AT HOME, OR GET ALONG WITH OTHER PEOPLE: SOMEWHAT DIFFICULT
8. MOVING OR SPEAKING SO SLOWLY THAT OTHER PEOPLE COULD HAVE NOTICED. OR THE OPPOSITE, BEING SO FIGETY OR RESTLESS THAT YOU HAVE BEEN MOVING AROUND A LOT MORE THAN USUAL: NOT AT ALL
SUM OF ALL RESPONSES TO PHQ9 QUESTIONS 1 & 2: 6
SUM OF ALL RESPONSES TO PHQ QUESTIONS 1-9: 9
6. FEELING BAD ABOUT YOURSELF - OR THAT YOU ARE A FAILURE OR HAVE LET YOURSELF OR YOUR FAMILY DOWN: SEVERAL DAYS
SUM OF ALL RESPONSES TO PHQ QUESTIONS 1-9: 9
9. THOUGHTS THAT YOU WOULD BE BETTER OFF DEAD, OR OF HURTING YOURSELF: NOT AT ALL
5. POOR APPETITE OR OVEREATING: NOT AT ALL
5. POOR APPETITE OR OVEREATING: NOT AT ALL
7. TROUBLE CONCENTRATING ON THINGS, SUCH AS READING THE NEWSPAPER OR WATCHING TELEVISION: NOT AT ALL
2. FEELING DOWN, DEPRESSED OR HOPELESS: NEARLY EVERY DAY
4. FEELING TIRED OR HAVING LITTLE ENERGY: MORE THAN HALF THE DAYS
SUM OF ALL RESPONSES TO PHQ QUESTIONS 1-9: 9
SUM OF ALL RESPONSES TO PHQ QUESTIONS 1-9: 9
4. FEELING TIRED OR HAVING LITTLE ENERGY: MORE THAN HALF THE DAYS
3. TROUBLE FALLING OR STAYING ASLEEP: NOT AT ALL
6. FEELING BAD ABOUT YOURSELF - OR THAT YOU ARE A FAILURE OR HAVE LET YOURSELF OR YOUR FAMILY DOWN: SEVERAL DAYS
9. THOUGHTS THAT YOU WOULD BE BETTER OFF DEAD, OR OF HURTING YOURSELF: NOT AT ALL
2. FEELING DOWN, DEPRESSED OR HOPELESS: NEARLY EVERY DAY
3. TROUBLE FALLING OR STAYING ASLEEP: NOT AT ALL
7. TROUBLE CONCENTRATING ON THINGS, SUCH AS READING THE NEWSPAPER OR WATCHING TELEVISION: NOT AT ALL

## 2025-01-21 ENCOUNTER — OFFICE VISIT (OUTPATIENT)
Dept: FAMILY MEDICINE CLINIC | Age: 70
End: 2025-01-21
Payer: MEDICARE

## 2025-01-21 VITALS
OXYGEN SATURATION: 97 % | HEART RATE: 95 BPM | WEIGHT: 111 LBS | BODY MASS INDEX: 20.43 KG/M2 | TEMPERATURE: 98 F | DIASTOLIC BLOOD PRESSURE: 62 MMHG | SYSTOLIC BLOOD PRESSURE: 102 MMHG | HEIGHT: 62 IN

## 2025-01-21 DIAGNOSIS — F33.0 MILD EPISODE OF RECURRENT MAJOR DEPRESSIVE DISORDER (HCC): Primary | ICD-10-CM

## 2025-01-21 DIAGNOSIS — I25.5 ISCHEMIC CARDIOMYOPATHY: ICD-10-CM

## 2025-01-21 DIAGNOSIS — R06.02 SOB (SHORTNESS OF BREATH): ICD-10-CM

## 2025-01-21 DIAGNOSIS — I51.7 CARDIOMEGALY: ICD-10-CM

## 2025-01-21 DIAGNOSIS — Z59.9 FINANCIAL DIFFICULTIES: ICD-10-CM

## 2025-01-21 DIAGNOSIS — I48.19 PERSISTENT ATRIAL FIBRILLATION (HCC): ICD-10-CM

## 2025-01-21 PROCEDURE — 99214 OFFICE O/P EST MOD 30 MIN: CPT | Performed by: FAMILY MEDICINE

## 2025-01-21 PROCEDURE — 1123F ACP DISCUSS/DSCN MKR DOCD: CPT | Performed by: FAMILY MEDICINE

## 2025-01-21 RX ORDER — BUPROPION HYDROCHLORIDE 150 MG/1
150 TABLET ORAL EVERY MORNING
Qty: 90 TABLET | Refills: 1 | Status: SHIPPED | OUTPATIENT
Start: 2025-01-21

## 2025-01-21 RX ORDER — ASPIRIN/CALCIUM/MAG/ALUMINUM 325 MG
325 TABLET ORAL DAILY
COMMUNITY
End: 2025-01-21

## 2025-01-21 RX ORDER — ESCITALOPRAM OXALATE 5 MG/1
5 TABLET ORAL DAILY
Qty: 30 TABLET | Refills: 1 | Status: SHIPPED | OUTPATIENT
Start: 2025-01-21

## 2025-01-21 RX ORDER — ASPIRIN 325 MG
325 TABLET ORAL DAILY
COMMUNITY

## 2025-01-21 SDOH — ECONOMIC STABILITY - INCOME SECURITY: PROBLEM RELATED TO HOUSING AND ECONOMIC CIRCUMSTANCES, UNSPECIFIED: Z59.9

## 2025-01-21 NOTE — PROGRESS NOTES
[facility]  Date of Encounter: 2025     Luisa Miranda (: 1955) is a 69 y.o. female who presents today for:   Chief Complaint   Patient presents with    Follow-up     Pt states that she has been doing about the same since her last OV    Depression       Symbicort too expensive so she decided not to pick it up.  Leave albuterol did not seem to help her breathing very much.  Does have follow-up with pulmonology next month.    Also decided to stop taking Xarelto.  She feels that she is already on aspirin and this should be enough to prevent blood clots and stroke.  This medication is also quite expensive for her.    Doing well overall on wellbutrin  mg daily. Still feeling down, not feeling like doing anything at times.            2025     8:51 PM 2024     1:05 PM 10/14/2022     7:55 AM   PHQ-9    Depression Unable to Assess  Pt refuses    Little interest or pleasure in doing things 3  3   Feeling down, depressed, or hopeless 3  2   Trouble falling or staying asleep, or sleeping too much 0  2   Feeling tired or having little energy 2  3   Poor appetite or overeating 0  2   Feeling bad about yourself - or that you are a failure or have let yourself or your family down 1  2   Trouble concentrating on things, such as reading the newspaper or watching television 0  0   Moving or speaking so slowly that other people could have noticed. Or the opposite - being so fidgety or restless that you have been moving around a lot more than usual 0  0   Thoughts that you would be better off dead, or of hurting yourself in some way 0  0   PHQ-2 Score 6  5   PHQ-9 Total Score 9  14   If you checked off any problems, how difficult have these problems made it for you to do your work, take care of things at home, or get along with other people? 1  1         ICD-10-CM    1. Mild episode of recurrent major depressive disorder (HCC)  F33.0 escitalopram (LEXAPRO) 5 MG tablet     buPROPion (WELLBUTRIN

## 2025-01-23 ENCOUNTER — TELEPHONE (OUTPATIENT)
Dept: FAMILY MEDICINE CLINIC | Age: 70
End: 2025-01-23

## 2025-01-23 DIAGNOSIS — R06.02 SOB (SHORTNESS OF BREATH): Primary | ICD-10-CM

## 2025-01-23 DIAGNOSIS — I48.91 ATRIAL FIBRILLATION WITH RVR (HCC): ICD-10-CM

## 2025-01-23 RX ORDER — FLUTICASONE PROPIONATE AND SALMETEROL 250; 50 UG/1; UG/1
1 POWDER RESPIRATORY (INHALATION) EVERY 12 HOURS
Qty: 60 EACH | Refills: 3 | Status: SHIPPED | OUTPATIENT
Start: 2025-01-23

## 2025-01-24 NOTE — TELEPHONE ENCOUNTER
Detail Level: Detailed SW made 2nd call attempt to reach patient to follow-up on [facility] referral from PCP.  SW was unable to reach pt and left message explaining reason for call.  SW left contact number asking for a return call.  PLAN: SW will attempt to reach pt another day if does not hear back from patient.

## 2025-01-27 NOTE — TELEPHONE ENCOUNTER
Patient returned call and stated that she spoke with a pharmacist and was going to try the medication.  Nurse advised the patient about the message, and recommendations as written by the provider.  Patient verbalized understanding.

## 2025-01-27 NOTE — TELEPHONE ENCOUNTER
Patient reports some concern about using Advair discus.  Please give her a call back.  The Advair discus is the equivalent of Symbicort, just slightly different active ingredients.  It does have a long-acting version of levalbuterol which does not typically cause any issues with heart rate or problems with atrial fibrillation. If she is still concerned, she may want to talk with her pulmonologist on if they think a steroid only inhaler would be helpful to try or not.

## 2025-01-28 NOTE — TELEPHONE ENCOUNTER
SW  received a call from pt stating she wanted to thank SW for the assistance but feels will not need any further assistance.  Pt related [facility] is no longer going to be able to help her with the Xarelto so she is going to see what her cardiologist is going to switch her to instead of the Xarelto.  Pt also related she is going to give Symbicort a try and see how it works.  PLAN: Pt related she knows how to reach out to SW if needs to contact to see if can offer any additional assistance.  SW will assist pt if needs additional assistance in the future.

## 2025-02-09 DIAGNOSIS — F33.0 MILD EPISODE OF RECURRENT MAJOR DEPRESSIVE DISORDER (HCC): ICD-10-CM

## 2025-02-10 RX ORDER — BUPROPION HYDROCHLORIDE 150 MG/1
150 TABLET ORAL EVERY MORNING
Qty: 90 TABLET | Refills: 1 | OUTPATIENT
Start: 2025-02-10

## 2025-02-10 NOTE — TELEPHONE ENCOUNTER
Spoke with pharmacy, they have refills for this medication and will let the patient know it will be ready to

## 2025-02-10 NOTE — TELEPHONE ENCOUNTER
Refill Request     CONFIRM preferred pharmacy with the patient.    If Mail Order Rx - Pend for 90 day refill.      Last Seen: Last Seen Department: 1/21/2025  Last Seen by PCP: 1/21/2025    Last Written: 1/21/25 #90 - 1 refill     If no future appointment scheduled:  Review the last OV with PCP and review information for follow-up visit,  Route STAFF MESSAGE with patient name to the  Pool for scheduling with the following information:            -  Timing of next visit           -  Visit type ie Physical, OV, etc           -  Diagnoses/Reason ie. COPD, HTN - Do not use MEDICATION, Follow-up or CHECK UP - Give reason for visit      Next Appointment:   Future Appointments   Date Time Provider Department Center   2/12/2025  2:20 PM Monster Baires MD [facility]   3/4/2025  3:00 PM Kerri Guerrero DO [facility]   3/17/2025  2:15 PM Enzweiler, Diane M, APRN - [facility]       Message sent to  to schedule appt with patient?  NO      Requested Prescriptions     Pending Prescriptions Disp Refills    buPROPion (WELLBUTRIN XL) 150 MG extended release tablet 90 tablet 1     Sig: Take 1 tablet by mouth every morning

## 2025-02-10 NOTE — TELEPHONE ENCOUNTER
Will need to verify with patient. There should be refills at her pharmacy.     Tried to call at this time but went straight to voicemail.

## 2025-02-12 ENCOUNTER — OFFICE VISIT (OUTPATIENT)
Dept: PULMONOLOGY | Age: 70
End: 2025-02-12

## 2025-02-12 VITALS
BODY MASS INDEX: 20.24 KG/M2 | WEIGHT: 110 LBS | TEMPERATURE: 97.6 F | OXYGEN SATURATION: 99 % | HEART RATE: 68 BPM | RESPIRATION RATE: 16 BRPM | SYSTOLIC BLOOD PRESSURE: 110 MMHG | DIASTOLIC BLOOD PRESSURE: 67 MMHG | HEIGHT: 62 IN

## 2025-02-12 DIAGNOSIS — R93.89 ABNORMAL CXR: ICD-10-CM

## 2025-02-12 DIAGNOSIS — R05.3 CHRONIC COUGH: ICD-10-CM

## 2025-02-12 DIAGNOSIS — R06.02 SOB (SHORTNESS OF BREATH): Primary | ICD-10-CM

## 2025-02-12 RX ORDER — BUDESONIDE, GLYCOPYRROLATE, AND FORMOTEROL FUMARATE 160; 9; 4.8 UG/1; UG/1; UG/1
2 AEROSOL, METERED RESPIRATORY (INHALATION) 2 TIMES DAILY
Qty: 2 EACH | Refills: 0 | Status: SHIPPED | COMMUNITY
Start: 2025-02-12

## 2025-02-12 RX ORDER — AZELASTINE 1 MG/ML
2 SPRAY, METERED NASAL 2 TIMES DAILY
Qty: 120 ML | Refills: 1 | Status: SHIPPED | OUTPATIENT
Start: 2025-02-12

## 2025-02-12 ASSESSMENT — ENCOUNTER SYMPTOMS
ABDOMINAL PAIN: 0
DIARRHEA: 0
BACK PAIN: 0
WHEEZING: 0
STRIDOR: 0
EYE DISCHARGE: 0
SORE THROAT: 0
VOMITING: 0
EYE REDNESS: 0
EYE PAIN: 0
NAUSEA: 0
TROUBLE SWALLOWING: 0
COLOR CHANGE: 0
CONSTIPATION: 0
ABDOMINAL DISTENTION: 0
COUGH: 1
EYE ITCHING: 0
SHORTNESS OF BREATH: 0

## 2025-02-12 NOTE — PROGRESS NOTES
MA Communication:  The following orders are received by verbal communication from   Monster Baires MD    Orders include:  FU 3 mo     Breztri samples

## 2025-02-12 NOTE — PROGRESS NOTES
[facility] Pulmonary Follow-up  [address] [place] [address]  [phone]        Luisa Miranda (: 1955 ) is a 70 y.o. female here for an evaluation of   Chief Complaint   Patient presents with    Follow-up    Shortness of Breath     6 wk         SUBJECTIVE/OBJECTIVE:  Patient is 70-year-old female with significant past medical history of CAD, hyperlipidemia that presents to [facility] pulmonary clinic for follow-up visit.  Patient complains of cough and postnasal drip.  She has tried both nebulizer and inhaler therapy without alleviation of her cough.  She is hesitant to have a bronchoscopy.  She has no other complaints today.     Patient has a history of mitral valve repair and atrial fibrillation.  She also has cardiomegaly that is seen on CT imaging.     Patient is a never smoker, no illicit drug use, no alcohol use.  She denies any occupational exposures.  She has no pets/birds at home, she denies any household exposures.      Review of Systems   Constitutional:  Negative for activity change, appetite change, chills, diaphoresis and fatigue.   HENT:  Negative for congestion, sore throat and trouble swallowing.    Eyes:  Negative for pain, discharge, redness and itching.   Respiratory:  Positive for cough. Negative for shortness of breath, wheezing and stridor.    Cardiovascular:  Negative for chest pain, palpitations and leg swelling.   Gastrointestinal:  Negative for abdominal distention, abdominal pain, constipation, diarrhea, nausea and vomiting.   Endocrine: Negative for polydipsia, polyphagia and polyuria.   Genitourinary:  Negative for difficulty urinating.   Musculoskeletal:  Negative for back pain, myalgias and neck pain.   Skin:  Negative for color change.   Neurological:  Negative for dizziness, weakness and light-headedness.   Psychiatric/Behavioral:  Negative for agitation and behavioral problems.          Vitals:    25 1414   BP: 110/67   Pulse: 68   Resp: 16   Temp:

## 2025-02-12 NOTE — PATIENT INSTRUCTIONS
Remember to bring a list of pulmonary medications and any CPAP or BiPAP machines to your next appointment with the office.     Please keep all of your future appointments scheduled by [facility] [place] [facility] office. Out of respect for other patients and providers, you may be asked to reschedule your appointment if you arrive later than your scheduled appointment time. Appointments cancelled less than 24hrs in advance will be considered a no show. Patients with three missed appointments within 1 year or four missed appointments within 2 years can be dismissed from the practice.     Please be aware that our physicians are required to work in the Intensive Care Unit at [facility].  Your appointment may need to be rescheduled if they are designated to work during your appointment time.      You may receive a survey regarding the care you received during your visit.  Your input is valuable to us.  We encourage you to complete and return your survey.  We hope you will choose us in the future for your healthcare needs.     Pt instructed of all future appointment dates & times, including radiology, labs, procedures & referrals. If procedures were scheduled preparation instructions provided. Instructions on future appointments with [facility] were given.     In the next few weeks, you will be receiving a survey from [facility] regarding your visit today.  We would greatly appreciate it if you would take just a few minutes to fill that out.  It is very important to us that our patients receive top notch care and our surveys help keep us accountable. However, if your experience was not a good one, we want to hear about that as well. This is a key way we can keep track of problems and strive to correct any for future visits.    Again, we appreciate your time and thank you for choosing [facility]!    Melissa MONTES DE OCA

## 2025-03-04 ENCOUNTER — OFFICE VISIT (OUTPATIENT)
Dept: FAMILY MEDICINE CLINIC | Age: 70
End: 2025-03-04
Payer: MEDICARE

## 2025-03-04 ENCOUNTER — OFFICE VISIT (OUTPATIENT)
Dept: FAMILY MEDICINE CLINIC | Age: 70
End: 2025-03-04

## 2025-03-04 VITALS
WEIGHT: 107.2 LBS | BODY MASS INDEX: 19.73 KG/M2 | SYSTOLIC BLOOD PRESSURE: 90 MMHG | HEART RATE: 77 BPM | HEIGHT: 62 IN | DIASTOLIC BLOOD PRESSURE: 60 MMHG | OXYGEN SATURATION: 99 % | TEMPERATURE: 97.4 F

## 2025-03-04 VITALS
HEIGHT: 62 IN | SYSTOLIC BLOOD PRESSURE: 90 MMHG | RESPIRATION RATE: 16 BRPM | HEART RATE: 77 BPM | BODY MASS INDEX: 19.69 KG/M2 | OXYGEN SATURATION: 99 % | WEIGHT: 107 LBS | DIASTOLIC BLOOD PRESSURE: 60 MMHG

## 2025-03-04 DIAGNOSIS — Z00.00 MEDICARE ANNUAL WELLNESS VISIT, SUBSEQUENT: Primary | ICD-10-CM

## 2025-03-04 DIAGNOSIS — Z00.00 INITIAL MEDICARE ANNUAL WELLNESS VISIT: ICD-10-CM

## 2025-03-04 DIAGNOSIS — R09.82 PND (POST-NASAL DRIP): Primary | ICD-10-CM

## 2025-03-04 DIAGNOSIS — I48.19 PERSISTENT ATRIAL FIBRILLATION (HCC): ICD-10-CM

## 2025-03-04 DIAGNOSIS — E78.5 DYSLIPIDEMIA: ICD-10-CM

## 2025-03-04 DIAGNOSIS — F33.0 MILD EPISODE OF RECURRENT MAJOR DEPRESSIVE DISORDER: ICD-10-CM

## 2025-03-04 PROCEDURE — 1123F ACP DISCUSS/DSCN MKR DOCD: CPT | Performed by: FAMILY MEDICINE

## 2025-03-04 PROCEDURE — G0439 PPPS, SUBSEQ VISIT: HCPCS | Performed by: FAMILY MEDICINE

## 2025-03-04 PROCEDURE — 1159F MED LIST DOCD IN RCRD: CPT | Performed by: FAMILY MEDICINE

## 2025-03-04 RX ORDER — FLUTICASONE PROPIONATE 50 MCG
1 SPRAY, SUSPENSION (ML) NASAL DAILY
Qty: 16 G | Refills: 5 | Status: SHIPPED | OUTPATIENT
Start: 2025-03-04

## 2025-03-04 RX ORDER — ESCITALOPRAM OXALATE 5 MG/1
5 TABLET ORAL DAILY
Qty: 90 TABLET | Refills: 1 | Status: SHIPPED | OUTPATIENT
Start: 2025-03-04

## 2025-03-04 ASSESSMENT — LIFESTYLE VARIABLES
HOW OFTEN DO YOU HAVE A DRINK CONTAINING ALCOHOL: NEVER
HOW MANY STANDARD DRINKS CONTAINING ALCOHOL DO YOU HAVE ON A TYPICAL DAY: PATIENT DOES NOT DRINK

## 2025-03-04 ASSESSMENT — PATIENT HEALTH QUESTIONNAIRE - PHQ9
2. FEELING DOWN, DEPRESSED OR HOPELESS: NOT AT ALL
6. FEELING BAD ABOUT YOURSELF - OR THAT YOU ARE A FAILURE OR HAVE LET YOURSELF OR YOUR FAMILY DOWN: NOT AT ALL
SUM OF ALL RESPONSES TO PHQ QUESTIONS 1-9: 4
5. POOR APPETITE OR OVEREATING: NOT AT ALL
9. THOUGHTS THAT YOU WOULD BE BETTER OFF DEAD, OR OF HURTING YOURSELF: NOT AT ALL
1. LITTLE INTEREST OR PLEASURE IN DOING THINGS: NOT AT ALL
4. FEELING TIRED OR HAVING LITTLE ENERGY: NEARLY EVERY DAY
3. TROUBLE FALLING OR STAYING ASLEEP: SEVERAL DAYS
SUM OF ALL RESPONSES TO PHQ QUESTIONS 1-9: 4
SUM OF ALL RESPONSES TO PHQ QUESTIONS 1-9: 4
8. MOVING OR SPEAKING SO SLOWLY THAT OTHER PEOPLE COULD HAVE NOTICED. OR THE OPPOSITE, BEING SO FIGETY OR RESTLESS THAT YOU HAVE BEEN MOVING AROUND A LOT MORE THAN USUAL: NOT AT ALL
7. TROUBLE CONCENTRATING ON THINGS, SUCH AS READING THE NEWSPAPER OR WATCHING TELEVISION: NOT AT ALL
SUM OF ALL RESPONSES TO PHQ QUESTIONS 1-9: 4
10. IF YOU CHECKED OFF ANY PROBLEMS, HOW DIFFICULT HAVE THESE PROBLEMS MADE IT FOR YOU TO DO YOUR WORK, TAKE CARE OF THINGS AT HOME, OR GET ALONG WITH OTHER PEOPLE: NOT DIFFICULT AT ALL

## 2025-03-04 NOTE — PROGRESS NOTES
metoprolol succinate (TOPROL XL) 25 MG extended release tablet Take 1 tablet by mouth daily 90 tablet 3    Melatonin 1 MG/ML LIQD Take 5 mLs by mouth      levothyroxine (SYNTHROID) 25 MCG tablet Take 1 tablet by mouth in the morning. 30 tablet 0    diphenhydrAMINE HCl (BENADRYL ALLERGY PO) Take by mouth      acetaminophen (TYLENOL) 650 MG extended release tablet Take 1 tablet by mouth every 8 hours as needed for Pain            Kerri Umanzor,   Family Medicine  3/4/2025

## 2025-03-04 NOTE — PROGRESS NOTES
Medicare Annual Wellness Visit    Luisa Miranda is here for Medicare AWV    Assessment & Plan     Medicare annual wellness visit, subsequent     No follow-ups on file.     Subjective       Patient's complete Health Risk Assessment and screening values have been reviewed and are found in Flowsheets. The following problems were reviewed today and where indicated follow up appointments were made and/or referrals ordered.    Positive Risk Factor Screenings with Interventions:              Inactivity:  On average, how many days per week do you engage in moderate to strenuous exercise (like a brisk walk)?: 2 days (!) Abnormal  On average, how many minutes do you engage in exercise at this level?: 30 min  Interventions:  See AVS for additional education material      Dentist Screen:  Have you seen the dentist within the past year?: (!) No    Intervention:  See AVS for additional education material     Vision Screen:  Do you have difficulty driving, watching TV, or doing any of your daily activities because of your eyesight?: No  Have you had an eye exam within the past year?: (!) No  Interventions:   See AVS for additional education material    Safety:  Do you have any tripping hazards - loose or unsecured carpets or rugs?: (!) Yes  Interventions:  See AVS for additional education material                   Objective   Vitals:    03/04/25 1549   BP: 90/60   Pulse: 77   Resp: 16   SpO2: 99%   Weight: 48.5 kg (107 lb)   Height: 1.575 m (5' 2\")      Body mass index is 19.57 kg/m².                    Allergies   Allergen Reactions    Egg-Derived Products     Lactose      Rash with any diary products     Prior to Visit Medications    Medication Sig Taking? Authorizing Provider   fluticasone (FLONASE) 50 MCG/ACT nasal spray 1 spray by Each Nostril route daily  Kerri Guerrero, DO   escitalopram (LEXAPRO) 5 MG tablet Take 1 tablet by mouth daily  Kerri Guerrero, DO   azelastine (ASTELIN) 0.1 % nasal spray 2 sprays by

## 2025-03-04 NOTE — PATIENT INSTRUCTIONS
activities.  It can help you function better in daily life.  Balancing.  This helps you stay coordinated and have good posture.  Includes heel-to-toe walking, angely chi, and certain types of yoga.  Aim for at least 3 days a week.  It can reduce your risk of falling.  Even if you have a hard time meeting the recommendations, it's better to be more active than less active. All activity done in each category counts toward your weekly total. You'd be surprised how daily things like carrying groceries, keeping up with grandchildren, and taking the stairs can add up.  What keeps you from being active?  If you've had a hard time being more active, you're not alone. Maybe you remember being able to do more. Or maybe you've never thought of yourself as being active. It's frustrating when you can't do the things you want. Being more active can help. What's holding you back?  Getting started.  Have a goal, but break it into easy tasks. Small steps build into big accomplishments.  Staying motivated.  If you feel like skipping your activity, remember your goal. Maybe you want to move better and stay independent. Every activity gets you one step closer.  Not feeling your best.  Start with 5 minutes of an activity you enjoy. Prove to yourself you can do it. As you get comfortable, increase your time.  You may not be where you want to be. But you're in the process of getting there. Everyone starts somewhere.  How can you find safe ways to stay active?  Talk with your doctor about any physical challenges you're facing. Make a plan with your doctor if you have a health problem or aren't sure how to get started with activity.  If you're already active, ask your doctor if there is anything you should change to stay safe as your body and health change.  If you tend to feel dizzy after you take medicine, avoid activity at that time. Try being active before you take your medicine. This will reduce your risk of falls.  If you plan to be

## 2025-03-05 LAB
ALBUMIN SERPL-MCNC: 4.1 G/DL (ref 3.4–5)
ALBUMIN/GLOB SERPL: 1.2 {RATIO} (ref 1.1–2.2)
ALP SERPL-CCNC: 89 U/L (ref 40–129)
ALT SERPL-CCNC: 17 U/L (ref 10–40)
ANION GAP SERPL CALCULATED.3IONS-SCNC: 10 MMOL/L (ref 3–16)
AST SERPL-CCNC: 19 U/L (ref 15–37)
BILIRUB SERPL-MCNC: <0.2 MG/DL (ref 0–1)
BUN SERPL-MCNC: 18 MG/DL (ref 7–20)
CALCIUM SERPL-MCNC: 10.1 MG/DL (ref 8.3–10.6)
CHLORIDE SERPL-SCNC: 95 MMOL/L (ref 99–110)
CHOLEST SERPL-MCNC: 174 MG/DL (ref 0–199)
CO2 SERPL-SCNC: 30 MMOL/L (ref 21–32)
CREAT SERPL-MCNC: 0.9 MG/DL (ref 0.6–1.2)
GFR SERPLBLD CREATININE-BSD FMLA CKD-EPI: 68 ML/MIN/{1.73_M2}
GLUCOSE SERPL-MCNC: 97 MG/DL (ref 70–99)
HDLC SERPL-MCNC: 57 MG/DL (ref 40–60)
LDLC SERPL CALC-MCNC: 94 MG/DL
POTASSIUM SERPL-SCNC: 4.9 MMOL/L (ref 3.5–5.1)
PROT SERPL-MCNC: 7.6 G/DL (ref 6.4–8.2)
SODIUM SERPL-SCNC: 135 MMOL/L (ref 136–145)
TRIGL SERPL-MCNC: 117 MG/DL (ref 0–150)
VLDLC SERPL CALC-MCNC: 23 MG/DL

## 2025-03-12 ENCOUNTER — RESULTS FOLLOW-UP (OUTPATIENT)
Dept: FAMILY MEDICINE CLINIC | Age: 70
End: 2025-03-12

## 2025-03-14 NOTE — PROGRESS NOTES
Units 03/04/25 15:51   Cholesterol, Total 0 - 199 mg/dL 174   HDL Cholesterol 40 - 60 mg/dL 57   LDL Cholesterol <100 mg/dL 94   Triglycerides 0 - 150 mg/dL 117   VLDL Not Established mg/dL 23     Limited Echo 4/10/24  Limited echo for LVEF and s/p mitral valve repair.   Irregular heart rhythm throughout exam.   Left ventricular systolic function is hyperdynamic with an estimated   ejection fraction of >= 65%.   Biatrial enlargement.   Mitral annular calcification.   There is a 38 mm Doll Physio II annuloplasty ring in the mitral valve   position, it appears well seated.   There is trivial mitral valve regurgitation.   Mild aortic valve regurgitation.   Mild tricuspid regurgitation.   The right atrium is severely enlarged.       5/31/23 Limited echo:   Limited echo for LV function and mitral valve.      Normal systolic function with an estimated ejection fraction of 55%.   Mild concentric left ventricular hypertrophy.   No regional wall motion abnormalities are seen.   Diastolic function not assessed due to mitral prosthesis.   The left atrium is severely dilated.   The right atrium is mildly dilated.   S/P mitral valve repair.   Mitral annular calcification.   An 38 mm Doll Physio II annuloplasty ring is seen in the mitral position   with a max velocity of 1.52 m/sec, maximum gradient of 9 mmHg and a mean   gradient of 3 mmHg.   Mild mitral stenosis due MV repair.   MIld mitral regurgitation.   Mild tricuspid regurgitation.   Systolic pulmonary artery pressure (SPAP) is normal and estimated at 38mmHg   (right atrial pressure 3 mmHg).   Irregular heart rate throughout the study.   Compared to last echo on 5/24/2022, EF has improved.     Echo: 04/12/2022  Summary  Limited echo for LV function/post op eval with limited doppler/color.  Global left ventricular function is mildly decreased with ejection fraction estimated at 45%.  EF by Guzman's method estimated at 45%.  Mild concentric left ventricular

## 2025-03-17 ENCOUNTER — OFFICE VISIT (OUTPATIENT)
Dept: CARDIOLOGY CLINIC | Age: 70
End: 2025-03-17
Payer: MEDICARE

## 2025-03-17 VITALS
HEIGHT: 61 IN | SYSTOLIC BLOOD PRESSURE: 102 MMHG | BODY MASS INDEX: 20.39 KG/M2 | DIASTOLIC BLOOD PRESSURE: 60 MMHG | WEIGHT: 108 LBS | OXYGEN SATURATION: 97 % | HEART RATE: 73 BPM

## 2025-03-17 DIAGNOSIS — I48.19 PERSISTENT ATRIAL FIBRILLATION (HCC): ICD-10-CM

## 2025-03-17 DIAGNOSIS — I34.0 NONRHEUMATIC MITRAL VALVE REGURGITATION: ICD-10-CM

## 2025-03-17 DIAGNOSIS — R06.02 SHORTNESS OF BREATH: ICD-10-CM

## 2025-03-17 DIAGNOSIS — I42.8 NON-ISCHEMIC CARDIOMYOPATHY (HCC): Primary | ICD-10-CM

## 2025-03-17 DIAGNOSIS — R06.02 SOB (SHORTNESS OF BREATH): ICD-10-CM

## 2025-03-17 DIAGNOSIS — E78.5 HYPERLIPIDEMIA LDL GOAL <100: ICD-10-CM

## 2025-03-17 DIAGNOSIS — I50.32 CHRONIC HEART FAILURE WITH PRESERVED EJECTION FRACTION (HFPEF, >= 50%) (HCC): ICD-10-CM

## 2025-03-17 PROCEDURE — G2211 COMPLEX E/M VISIT ADD ON: HCPCS | Performed by: NURSE PRACTITIONER

## 2025-03-17 PROCEDURE — 1123F ACP DISCUSS/DSCN MKR DOCD: CPT | Performed by: NURSE PRACTITIONER

## 2025-03-17 PROCEDURE — 1160F RVW MEDS BY RX/DR IN RCRD: CPT | Performed by: NURSE PRACTITIONER

## 2025-03-17 PROCEDURE — 99214 OFFICE O/P EST MOD 30 MIN: CPT | Performed by: NURSE PRACTITIONER

## 2025-03-17 PROCEDURE — 1159F MED LIST DOCD IN RCRD: CPT | Performed by: NURSE PRACTITIONER

## 2025-03-17 RX ORDER — ASPIRIN 325 MG
325 TABLET ORAL DAILY
COMMUNITY
End: 2025-03-17 | Stop reason: ALTCHOICE

## 2025-03-17 NOTE — PATIENT INSTRUCTIONS
Resume Xarelto 20 mg daily    Continue other medications    [phone][facility] to schedule  -call and schedule ultrasound of the heart

## 2025-03-19 DIAGNOSIS — F33.0 MILD EPISODE OF RECURRENT MAJOR DEPRESSIVE DISORDER: ICD-10-CM

## 2025-03-19 RX ORDER — ESCITALOPRAM OXALATE 5 MG/1
5 TABLET ORAL DAILY
Qty: 90 TABLET | Refills: 1 | Status: SHIPPED | OUTPATIENT
Start: 2025-03-19

## 2025-03-19 NOTE — TELEPHONE ENCOUNTER
Refill Request     CONFIRM preferred pharmacy with the patient.    If Mail Order Rx - Pend for 90 day refill.      Last Seen: Last Seen Department: 3/4/2025  Last Seen by PCP: 3/4/2025    Last Written: 3/4/25 90 with 1 refill     If no future appointment scheduled:  Review the last OV with PCP and review information for follow-up visit,  Route STAFF MESSAGE with patient name to the  Pool for scheduling with the following information:            -  Timing of next visit           -  Visit type ie Physical, OV, etc           -  Diagnoses/Reason ie. COPD, HTN - Do not use MEDICATION, Follow-up or CHECK UP - Give reason for visit      Next Appointment:   Future Appointments   Date Time Provider Department Center   4/17/2025  2:30 PM [facility]   5/27/2025  2:40 PM Monster Baires MD [facility]   9/4/2025  3:00 PM Kerri Guerrero DO [facility]   9/22/2025  2:45 PM Laith Ordaz MD [facility]       Message sent to  to schedule appt with patient?  NO      Requested Prescriptions     Pending Prescriptions Disp Refills    escitalopram (LEXAPRO) 5 MG tablet 90 tablet 1     Sig: Take 1 tablet by mouth daily

## 2025-04-17 ENCOUNTER — RESULTS FOLLOW-UP (OUTPATIENT)
Dept: CARDIOLOGY CLINIC | Age: 70
End: 2025-04-17

## 2025-04-17 ENCOUNTER — HOSPITAL ENCOUNTER (OUTPATIENT)
Dept: CARDIOLOGY | Age: 70
Discharge: HOME | End: 2025-04-19
Payer: MEDICARE

## 2025-04-17 VITALS
DIASTOLIC BLOOD PRESSURE: 60 MMHG | BODY MASS INDEX: 20.2 KG/M2 | SYSTOLIC BLOOD PRESSURE: 100 MMHG | WEIGHT: 107 LBS | HEIGHT: 61 IN

## 2025-04-17 DIAGNOSIS — R06.02 SHORTNESS OF BREATH: ICD-10-CM

## 2025-04-17 LAB
ECHO AO ASC DIAM: 3.5 CM
ECHO AO ASCENDING AORTA INDEX: 2.41 CM/M2
ECHO AO ROOT DIAM: 2.8 CM
ECHO AO ROOT INDEX: 1.93 CM/M2
ECHO AV CUSP MM: 1.9 CM
ECHO BSA: 1.45 M2
ECHO EST RA PRESSURE: 3 MMHG
ECHO IVC PROX: 1.6 CM
ECHO LA AREA 2C: 62.7 CM2
ECHO LA AREA 4C: 68.7 CM2
ECHO LA DIAMETER INDEX: 4.62 CM/M2
ECHO LA DIAMETER: 6.7 CM
ECHO LA MAJOR AXIS: 11.7 CM
ECHO LA MINOR AXIS: 10 CM
ECHO LA TO AORTIC ROOT RATIO: 2.39
ECHO LA VOL BP: 350 ML (ref 22–52)
ECHO LA VOL MOD A2C: 345 ML (ref 22–52)
ECHO LA VOL MOD A4C: 325 ML (ref 22–52)
ECHO LA VOL/BSA BIPLANE: 241 ML/M2 (ref 16–34)
ECHO LA VOLUME INDEX MOD A2C: 238 ML/M2 (ref 16–34)
ECHO LA VOLUME INDEX MOD A4C: 224 ML/M2 (ref 16–34)
ECHO LV E' LATERAL VELOCITY: 16.8 CM/S
ECHO LV E' SEPTAL VELOCITY: 9.9 CM/S
ECHO LV EDV 3D: 69 ML
ECHO LV EDV INDEX 3D: 48 ML/M2
ECHO LV EF PHYSICIAN: 65 %
ECHO LV EJECTION FRACTION 3D: 68 %
ECHO LV ESV 3D: 30 ML
ECHO LV ESV INDEX 3D: 21 ML/M2
ECHO LV FRACTIONAL SHORTENING: 38 % (ref 28–44)
ECHO LV GLOBAL LONGITUDINAL STRAIN (GLS): -12.2 %
ECHO LV GLOBAL LONGITUDINAL STRAIN (GLS): -17.2 %
ECHO LV GLOBAL LONGITUDINAL STRAIN (GLS): -19.2 %
ECHO LV GLOBAL LONGITUDINAL STRAIN (GLS): -20.3 %
ECHO LV INTERNAL DIMENSION DIASTOLE INDEX: 2.55 CM/M2
ECHO LV INTERNAL DIMENSION DIASTOLIC: 3.7 CM (ref 3.9–5.3)
ECHO LV INTERNAL DIMENSION SYSTOLIC INDEX: 1.59 CM/M2
ECHO LV INTERNAL DIMENSION SYSTOLIC: 2.3 CM
ECHO LV IVSD: 0.9 CM (ref 0.6–0.9)
ECHO LV MASS 2D: 89.5 G (ref 67–162)
ECHO LV MASS 3D INDEX: 36.6 G/M2
ECHO LV MASS 3D: 53 G
ECHO LV MASS INDEX 2D: 61.7 G/M2 (ref 43–95)
ECHO LV POSTERIOR WALL DIASTOLIC: 0.8 CM (ref 0.6–0.9)
ECHO LV RELATIVE WALL THICKNESS RATIO: 0.43
ECHO LVOT AREA: 2.8 CM2
ECHO LVOT DIAM: 1.9 CM
ECHO LVOT MEAN GRADIENT: 1 MMHG
ECHO LVOT PEAK GRADIENT: 2 MMHG
ECHO LVOT PEAK VELOCITY: 0.7 M/S
ECHO LVOT STROKE VOLUME INDEX: 32.1 ML/M2
ECHO LVOT SV: 46.5 ML
ECHO LVOT VTI: 16.4 CM
ECHO MV AREA VTI: 2 CM2
ECHO MV E DECELERATION TIME (DT): 149 MS
ECHO MV E VELOCITY: 1.48 M/S
ECHO MV E/E' LATERAL: 8.81
ECHO MV E/E' RATIO (AVERAGED): 11.88
ECHO MV E/E' SEPTAL: 14.95
ECHO MV LVOT VTI INDEX: 1.43
ECHO MV MAX VELOCITY: 1.4 M/S
ECHO MV MEAN GRADIENT: 5 MMHG
ECHO MV MEAN VELOCITY: 1 M/S
ECHO MV PEAK GRADIENT: 8 MMHG
ECHO MV VTI: 23.5 CM
ECHO RA AREA 4C: 15.9 CM2
ECHO RA END SYSTOLIC VOLUME APICAL 4 CHAMBER INDEX BSA: 21 ML/M2
ECHO RA VOLUME: 31 ML
ECHO RIGHT VENTRICULAR SYSTOLIC PRESSURE (RVSP): 31 MMHG
ECHO RV BASAL DIMENSION: 2.7 CM
ECHO RV FREE WALL PEAK S': 10.3 CM/S
ECHO RV LONGITUDINAL DIMENSION: 5.9 CM
ECHO RV MID DIMENSION: 1.9 CM
ECHO RV TAPSE: 1.7 CM (ref 1.7–?)
ECHO TV REGURGITANT MAX VELOCITY: 2.65 M/S
ECHO TV REGURGITANT PEAK GRADIENT: 28 MMHG

## 2025-04-17 PROCEDURE — 93306 TTE W/DOPPLER COMPLETE: CPT

## 2025-04-30 DIAGNOSIS — E03.9 HYPOTHYROIDISM, UNSPECIFIED TYPE: ICD-10-CM

## 2025-04-30 RX ORDER — LEVOTHYROXINE SODIUM 25 UG/1
25 TABLET ORAL DAILY
Qty: 30 TABLET | Refills: 0 | OUTPATIENT
Start: 2025-04-30

## 2025-04-30 RX ORDER — LEVOTHYROXINE SODIUM 25 UG/1
25 TABLET ORAL DAILY
Qty: 90 TABLET | Refills: 1 | Status: SHIPPED | OUTPATIENT
Start: 2025-04-30

## 2025-04-30 NOTE — TELEPHONE ENCOUNTER
Refill Request     CONFIRM preferred pharmacy with the patient.    If Mail Order Rx - Pend for 90 day refill.      Last Seen: Last Seen Department: 3/4/2025  Last Seen by PCP: Visit date not found    Last Written: 07/26/22 30 with 0 refills    If no future appointment scheduled:  Review the last OV with PCP and review information for follow-up visit,  Route STAFF MESSAGE with patient name to the  Pool for scheduling with the following information:            -  Timing of next visit           -  Visit type ie Physical, OV, etc           -  Diagnoses/Reason ie. COPD, HTN - Do not use MEDICATION, Follow-up or CHECK UP - Give reason for visit      Next Appointment:   Future Appointments   Date Time Provider Department Center   5/27/2025  2:40 PM Monster Baires [facility]   9/4/2025  3:00 PM Kerri Guerrero DO [facility]   9/22/2025  2:45 PM Laith Ordaz MD [facility]       Message sent to  to schedule appt with patient?  NO      Requested Prescriptions     Pending Prescriptions Disp Refills    levothyroxine (SYNTHROID) 25 MCG tablet 30 tablet 0     Sig: Take 1 tablet by mouth Daily

## 2025-05-22 RX ORDER — METOPROLOL SUCCINATE 25 MG/1
25 TABLET, EXTENDED RELEASE ORAL DAILY
Qty: 90 TABLET | Refills: 3 | Status: SHIPPED | OUTPATIENT
Start: 2025-05-22

## 2025-05-22 NOTE — TELEPHONE ENCOUNTER
LOV: 3/17/25 NPDE  NOV: 9/22/25 RJM  Labs: CMP 3/4/25  Lab Results   Component Value Date     (L) 03/04/2025    K 4.9 03/04/2025    CL 95 (L) 03/04/2025    CO2 30 03/04/2025    BUN 18 03/04/2025    CREATININE 0.9 03/04/2025    GLUCOSE 97 03/04/2025    CALCIUM 10.1 03/04/2025    BILITOT <0.2 03/04/2025    ALKPHOS 89 03/04/2025    AST 19 03/04/2025    ALT 17 03/04/2025    LABGLOM 68 03/04/2025    GFRAA >60 06/09/2022    AGRATIO 1.2 03/04/2025

## 2025-09-04 ENCOUNTER — OFFICE VISIT (OUTPATIENT)
Dept: FAMILY MEDICINE CLINIC | Age: 70
End: 2025-09-04
Payer: MEDICARE

## 2025-09-04 VITALS
BODY MASS INDEX: 19.94 KG/M2 | TEMPERATURE: 97.2 F | OXYGEN SATURATION: 95 % | HEART RATE: 88 BPM | DIASTOLIC BLOOD PRESSURE: 60 MMHG | WEIGHT: 105.6 LBS | SYSTOLIC BLOOD PRESSURE: 100 MMHG | HEIGHT: 61 IN

## 2025-09-04 DIAGNOSIS — E03.9 HYPOTHYROIDISM, UNSPECIFIED TYPE: ICD-10-CM

## 2025-09-04 DIAGNOSIS — R09.82 PND (POST-NASAL DRIP): ICD-10-CM

## 2025-09-04 DIAGNOSIS — I48.19 PERSISTENT ATRIAL FIBRILLATION (HCC): ICD-10-CM

## 2025-09-04 DIAGNOSIS — R05.3 CHRONIC COUGH: ICD-10-CM

## 2025-09-04 DIAGNOSIS — F33.0 MILD EPISODE OF RECURRENT MAJOR DEPRESSIVE DISORDER: Primary | ICD-10-CM

## 2025-09-04 PROCEDURE — 1160F RVW MEDS BY RX/DR IN RCRD: CPT | Performed by: FAMILY MEDICINE

## 2025-09-04 PROCEDURE — 99214 OFFICE O/P EST MOD 30 MIN: CPT | Performed by: FAMILY MEDICINE

## 2025-09-04 PROCEDURE — 1159F MED LIST DOCD IN RCRD: CPT | Performed by: FAMILY MEDICINE

## 2025-09-04 PROCEDURE — 1123F ACP DISCUSS/DSCN MKR DOCD: CPT | Performed by: FAMILY MEDICINE

## 2025-09-04 PROCEDURE — G2211 COMPLEX E/M VISIT ADD ON: HCPCS | Performed by: FAMILY MEDICINE

## 2025-09-04 RX ORDER — IPRATROPIUM BROMIDE 21 UG/1
2 SPRAY, METERED NASAL EVERY 12 HOURS
Qty: 30 ML | Refills: 5 | Status: SHIPPED | OUTPATIENT
Start: 2025-09-04

## 2025-09-04 SDOH — ECONOMIC STABILITY: FOOD INSECURITY: WITHIN THE PAST 12 MONTHS, THE FOOD YOU BOUGHT JUST DIDN'T LAST AND YOU DIDN'T HAVE MONEY TO GET MORE.: NEVER TRUE

## 2025-09-04 SDOH — ECONOMIC STABILITY: FOOD INSECURITY: WITHIN THE PAST 12 MONTHS, YOU WORRIED THAT YOUR FOOD WOULD RUN OUT BEFORE YOU GOT MONEY TO BUY MORE.: NEVER TRUE

## (undated) DEVICE — DRAIN SURG 24FR L5/16IN DIA8MM SIL RND HUBLESS FULL FLUT

## (undated) DEVICE — Device: Brand: MEDEX

## (undated) DEVICE — FOGARTY ARTERIAL EMBOLECTOMY CATHETER 4F 80CM: Brand: FOGARTY

## (undated) DEVICE — STERILE LATEX POWDER-FREE SURGICAL GLOVESWITH NITRILE COATING: Brand: PROTEXIS

## (undated) DEVICE — EVERGRIP INSERT SET 61MM: Brand: FOGARTY EVERGRIP

## (undated) DEVICE — SYRINGE MED 3ML CLR PLAS STD N CTRL LUERLOCK TIP DISP

## (undated) DEVICE — DRAPE SURG HRT STRL

## (undated) DEVICE — CONNECTOR PERF W3/8XH3/8IN BASE STR W/O LUERLOCK FOR CUST

## (undated) DEVICE — 3M™ IOBAN™ 2 ANTIMICROBIAL INCISE DRAPE 6650EZ: Brand: IOBAN™ 2

## (undated) DEVICE — LOOP,VESSEL,MAXI,BLUE,2/PK,STERILE: Brand: MEDLINE

## (undated) DEVICE — SOLUTION IRRIG 2000ML 0.9% SOD CHL USP UROMATIC PLAS CONT

## (undated) DEVICE — SUTURE ETHBND EXCEL SZ 0 L18IN NONABSORBABLE GRN L36MM CT-1 CX21D

## (undated) DEVICE — PACK PROCEDURE SURG OPN HRT A BASIC

## (undated) DEVICE — Device

## (undated) DEVICE — LEAD PACEMKR MYOCARDIAL UNIPOLAR TEMP

## (undated) DEVICE — SUTURE ETHBND 2-0 L30IN NONABSORBABLE GRN WHT V-5 L17IN 1/2 10X52

## (undated) DEVICE — PROBE CRYOABLATION L10CM ALUM SMOOTH MAL CRYOICE

## (undated) DEVICE — COR-KNOT® QUICK LOAD® 6-POUCH: Brand: COR-KNOT® QUICK LOAD®

## (undated) DEVICE — LINE GAS SAMPLING VENT

## (undated) DEVICE — SUTURE NONABSORBABLE MONOFILAMENT 5-0 C-1 1X24 IN PROLENE 8725H

## (undated) DEVICE — GUIDEWIRE VASC L150CM DIA0.035IN FLX END L7CM J 3MM PTFE

## (undated) DEVICE — LIPIGUARD SB REINFUSION FILTER FOR SALVAGED BLOOD: Brand: LIPIGUARD SB

## (undated) DEVICE — SUTURE PROL SZ 6-0 L24IN NONABSORBABLE BLU L13MM C-1 3/8 8726H

## (undated) DEVICE — SUTURE MCRYL + SZ 4-0 L18IN ABSRB UD L19MM PS-2 3/8 CIR MCP496G

## (undated) DEVICE — TUBING PERF PMP L8FT OD3/8IN ID3/32IN MED PRECUT CAPPED

## (undated) DEVICE — ADHESIVE SKIN CLSR 0.7ML TOP DERMBND ADV

## (undated) DEVICE — SUTURE ETHBND EXCEL SZ 5 L30IN NONABSORBABLE GRN L40MM V-37 MB66G

## (undated) DEVICE — PERFUSION PACK O2

## (undated) DEVICE — SUTURE VCRL SZ 3-0 L27IN ABSRB UD L36MM CT-1 1/2 CIR J258H

## (undated) DEVICE — ADAPTER PERF L7.5IN INLET LEG 3IN Y TYP VENT CLR CODE CLMP

## (undated) DEVICE — LAPAROSCOPIC ACCESS SYSTEM: Brand: ALEXIS LAPAROSCOPIC SYSTEM WITH KII FIOS FIRST ENTRY

## (undated) DEVICE — SUTURE VCRL SZ 0 L27IN ABSRB UD L36MM CT-1 1/2 CIR J260H

## (undated) DEVICE — PERCLOSE PROGLIDE™ SUTURE-MEDIATED CLOSURE SYSTEM: Brand: PERCLOSE PROGLIDE™

## (undated) DEVICE — TUBING, SUCTION, 3/16" X 10', STRAIGHT: Brand: MEDLINE

## (undated) DEVICE — CONNECTOR PERF W0.25XH3/8IN BASE Y SHP REDUC W/O LUERLOCK

## (undated) DEVICE — PROBE ABLATN NERVE BLOCK 180 DEG 8 MM BALL TIP CRYOSPHERE

## (undated) DEVICE — RESERVOIR AUTOTRANSFUSION 225/120 CC GS FILTERED XTRA

## (undated) DEVICE — 3M™ STERI-STRIP™ REINFORCED ADHESIVE SKIN CLOSURES, R1547, 1/2 IN X 4 IN (12 MM X 100 MM), 6 STRIPS/ENVELOPE: Brand: 3M™ STERI-STRIP™

## (undated) DEVICE — CANNULA PERF 7FR L5.5IN AORT ROOT RADPQ STD TIP W/ VENT LN

## (undated) DEVICE — SUTURE NONABSORBABLE MONOFILAMENT 4-0 RB-1 36 IN BLU PROLENE 8557H

## (undated) DEVICE — CATHETER URETH 18FR RED RUB INTMIT ALL PURP

## (undated) DEVICE — SUTURE ETHBND EXCEL SZ 2-0 L36IN NONABSORBABLE GRN SH-2 X559H

## (undated) DEVICE — KIT INSRT AD PED VEN GWIRE L180CM DIA0038IN STP VES DIL 8FR

## (undated) DEVICE — SOLUTION IV IRRIG POUR BRL 0.9% SODIUM CHL 2F7124

## (undated) DEVICE — TUBING, SUCTION, 3/16" X 12', STRAIGHT: Brand: MEDLINE

## (undated) DEVICE — COR-KNOT® MIS DEVICE KIT: Brand: COR-KNOT®

## (undated) DEVICE — SYRINGE MED 10ML SLIP TIP BLNT FILL AND LUERLOCK DISP

## (undated) DEVICE — CATHETER L HRT VENT SIL 16 IN OVERALL LEN 20FR N VENT CONN